# Patient Record
Sex: MALE | Race: BLACK OR AFRICAN AMERICAN | NOT HISPANIC OR LATINO | Employment: UNEMPLOYED | URBAN - METROPOLITAN AREA
[De-identification: names, ages, dates, MRNs, and addresses within clinical notes are randomized per-mention and may not be internally consistent; named-entity substitution may affect disease eponyms.]

---

## 2021-05-24 ENCOUNTER — TELEPHONE (OUTPATIENT)
Dept: PODIATRY | Facility: CLINIC | Age: 61
End: 2021-05-24

## 2021-05-28 ENCOUNTER — OFFICE VISIT (OUTPATIENT)
Dept: PODIATRY | Facility: CLINIC | Age: 61
End: 2021-05-28
Payer: MEDICAID

## 2021-05-28 VITALS — WEIGHT: 214.31 LBS | HEIGHT: 62 IN | BODY MASS INDEX: 39.44 KG/M2

## 2021-05-28 DIAGNOSIS — E11.9 COMPREHENSIVE DIABETIC FOOT EXAMINATION, TYPE 2 DM, ENCOUNTER FOR: Primary | ICD-10-CM

## 2021-05-28 DIAGNOSIS — B35.3 TINEA PEDIS OF BOTH FEET: ICD-10-CM

## 2021-05-28 DIAGNOSIS — M1A.4720 CHRONIC GOUT DUE TO OTHER SECONDARY CAUSE INVOLVING TOE OF LEFT FOOT WITHOUT TOPHUS: ICD-10-CM

## 2021-05-28 DIAGNOSIS — B35.1 ONYCHOMYCOSIS: ICD-10-CM

## 2021-05-28 DIAGNOSIS — M79.674 PAIN IN TOES OF BOTH FEET: ICD-10-CM

## 2021-05-28 DIAGNOSIS — M79.675 PAIN IN TOES OF BOTH FEET: ICD-10-CM

## 2021-05-28 PROCEDURE — 99999 PR PBB SHADOW E&M-EST. PATIENT-LVL III: ICD-10-PCS | Mod: PBBFAC,,, | Performed by: PODIATRIST

## 2021-05-28 PROCEDURE — 99204 PR OFFICE/OUTPT VISIT, NEW, LEVL IV, 45-59 MIN: ICD-10-PCS | Mod: 25,S$PBB,, | Performed by: PODIATRIST

## 2021-05-28 PROCEDURE — 11720 DEBRIDE NAIL 1-5: CPT | Mod: PBBFAC | Performed by: PODIATRIST

## 2021-05-28 PROCEDURE — 99999 PR PBB SHADOW E&M-EST. PATIENT-LVL III: CPT | Mod: PBBFAC,,, | Performed by: PODIATRIST

## 2021-05-28 PROCEDURE — 11720 DEBRIDE NAIL 1-5: CPT | Mod: S$PBB,,, | Performed by: PODIATRIST

## 2021-05-28 PROCEDURE — 99204 OFFICE O/P NEW MOD 45 MIN: CPT | Mod: 25,S$PBB,, | Performed by: PODIATRIST

## 2021-05-28 PROCEDURE — 11720 PR DEBRIDEMENT OF NAIL(S), 1-5: ICD-10-PCS | Mod: S$PBB,,, | Performed by: PODIATRIST

## 2021-05-28 PROCEDURE — 99213 OFFICE O/P EST LOW 20 MIN: CPT | Mod: PBBFAC | Performed by: PODIATRIST

## 2021-05-28 RX ORDER — ATORVASTATIN CALCIUM 20 MG/1
20 TABLET, FILM COATED ORAL DAILY
COMMUNITY
Start: 2021-04-27

## 2021-05-28 RX ORDER — INDOMETHACIN 50 MG/1
50 CAPSULE ORAL 3 TIMES DAILY PRN
Qty: 90 CAPSULE | Refills: 1 | Status: SHIPPED | OUTPATIENT
Start: 2021-05-28 | End: 2023-05-01 | Stop reason: SDUPTHER

## 2021-05-28 RX ORDER — FUROSEMIDE 40 MG/1
40 TABLET ORAL DAILY
COMMUNITY
Start: 2021-05-02

## 2021-05-28 RX ORDER — ASPIRIN 81 MG/1
TABLET ORAL
COMMUNITY

## 2021-05-28 RX ORDER — METOPROLOL SUCCINATE 100 MG/1
100 TABLET, EXTENDED RELEASE ORAL DAILY
COMMUNITY
Start: 2021-05-11

## 2021-05-28 RX ORDER — SITAGLIPTIN AND METFORMIN HYDROCHLORIDE 500; 50 MG/1; MG/1
1 TABLET, FILM COATED ORAL 2 TIMES DAILY
COMMUNITY
Start: 2021-04-20

## 2021-05-28 RX ORDER — KETOCONAZOLE 20 MG/G
CREAM TOPICAL 2 TIMES DAILY
Qty: 30 G | Refills: 2 | Status: SHIPPED | OUTPATIENT
Start: 2021-05-28 | End: 2021-06-27

## 2021-05-28 RX ORDER — TERBINAFINE HYDROCHLORIDE 250 MG/1
250 TABLET ORAL DAILY
Qty: 30 TABLET | Refills: 0 | Status: SHIPPED | OUTPATIENT
Start: 2021-05-28 | End: 2021-06-27

## 2021-05-28 RX ORDER — ALLOPURINOL 300 MG/1
300 TABLET ORAL DAILY
COMMUNITY
Start: 2021-05-02 | End: 2023-04-28 | Stop reason: SDUPTHER

## 2021-05-28 RX ORDER — INDOMETHACIN 50 MG/1
100 CAPSULE ORAL EVERY 8 HOURS PRN
COMMUNITY
Start: 2021-02-24 | End: 2021-05-28

## 2021-05-28 RX ORDER — CLOPIDOGREL BISULFATE 75 MG/1
75 TABLET ORAL DAILY
COMMUNITY
Start: 2021-04-23

## 2021-05-28 RX ORDER — SACUBITRIL AND VALSARTAN 24; 26 MG/1; MG/1
1 TABLET, FILM COATED ORAL 2 TIMES DAILY
COMMUNITY
Start: 2021-05-19

## 2021-08-31 ENCOUNTER — OFFICE VISIT (OUTPATIENT)
Dept: PODIATRY | Facility: CLINIC | Age: 61
End: 2021-08-31
Payer: MEDICAID

## 2021-08-31 VITALS — HEIGHT: 62 IN | WEIGHT: 208.13 LBS | BODY MASS INDEX: 38.3 KG/M2

## 2021-08-31 DIAGNOSIS — M79.674 PAIN IN TOES OF BOTH FEET: ICD-10-CM

## 2021-08-31 DIAGNOSIS — E11.8 TYPE 2 DIABETES MELLITUS WITH COMPLICATION: ICD-10-CM

## 2021-08-31 DIAGNOSIS — B35.1 ONYCHOMYCOSIS: Primary | ICD-10-CM

## 2021-08-31 DIAGNOSIS — M79.675 PAIN IN TOES OF BOTH FEET: ICD-10-CM

## 2021-08-31 PROCEDURE — 99499 UNLISTED E&M SERVICE: CPT | Mod: S$PBB,,, | Performed by: PODIATRIST

## 2021-08-31 PROCEDURE — 11721 DEBRIDE NAIL 6 OR MORE: CPT | Mod: PBBFAC | Performed by: PODIATRIST

## 2021-08-31 PROCEDURE — 99499 NO LOS: ICD-10-PCS | Mod: S$PBB,,, | Performed by: PODIATRIST

## 2021-08-31 PROCEDURE — 99213 OFFICE O/P EST LOW 20 MIN: CPT | Mod: PBBFAC | Performed by: PODIATRIST

## 2021-08-31 PROCEDURE — 11721 DEBRIDE NAIL 6 OR MORE: CPT | Mod: S$PBB,,, | Performed by: PODIATRIST

## 2021-08-31 PROCEDURE — 99999 PR PBB SHADOW E&M-EST. PATIENT-LVL III: ICD-10-PCS | Mod: PBBFAC,,, | Performed by: PODIATRIST

## 2021-08-31 PROCEDURE — 11721 PR DEBRIDEMENT OF NAILS, 6 OR MORE: ICD-10-PCS | Mod: S$PBB,,, | Performed by: PODIATRIST

## 2021-08-31 PROCEDURE — 99999 PR PBB SHADOW E&M-EST. PATIENT-LVL III: CPT | Mod: PBBFAC,,, | Performed by: PODIATRIST

## 2022-02-01 ENCOUNTER — OFFICE VISIT (OUTPATIENT)
Dept: PODIATRY | Facility: CLINIC | Age: 62
End: 2022-02-01
Payer: MEDICAID

## 2022-02-01 VITALS — HEIGHT: 62 IN | BODY MASS INDEX: 38.3 KG/M2 | WEIGHT: 208.13 LBS

## 2022-02-01 DIAGNOSIS — E11.8 TYPE 2 DIABETES MELLITUS WITH COMPLICATION: ICD-10-CM

## 2022-02-01 DIAGNOSIS — B35.1 ONYCHOMYCOSIS: Primary | ICD-10-CM

## 2022-02-01 DIAGNOSIS — M79.675 PAIN IN TOES OF BOTH FEET: ICD-10-CM

## 2022-02-01 DIAGNOSIS — M79.674 PAIN IN TOES OF BOTH FEET: ICD-10-CM

## 2022-02-01 PROCEDURE — 99213 OFFICE O/P EST LOW 20 MIN: CPT | Mod: PBBFAC | Performed by: PODIATRIST

## 2022-02-01 PROCEDURE — 99999 PR PBB SHADOW E&M-EST. PATIENT-LVL III: ICD-10-PCS | Mod: PBBFAC,,, | Performed by: PODIATRIST

## 2022-02-01 PROCEDURE — 99999 PR PBB SHADOW E&M-EST. PATIENT-LVL III: CPT | Mod: PBBFAC,,, | Performed by: PODIATRIST

## 2022-02-01 PROCEDURE — 3008F PR BODY MASS INDEX (BMI) DOCUMENTED: ICD-10-PCS | Mod: CPTII,,, | Performed by: PODIATRIST

## 2022-02-01 PROCEDURE — 1159F MED LIST DOCD IN RCRD: CPT | Mod: CPTII,,, | Performed by: PODIATRIST

## 2022-02-01 PROCEDURE — 11721 DEBRIDE NAIL 6 OR MORE: CPT | Mod: S$PBB,,, | Performed by: PODIATRIST

## 2022-02-01 PROCEDURE — 11721 DEBRIDE NAIL 6 OR MORE: CPT | Mod: PBBFAC | Performed by: PODIATRIST

## 2022-02-01 PROCEDURE — 1160F PR REVIEW ALL MEDS BY PRESCRIBER/CLIN PHARMACIST DOCUMENTED: ICD-10-PCS | Mod: CPTII,,, | Performed by: PODIATRIST

## 2022-02-01 PROCEDURE — 3008F BODY MASS INDEX DOCD: CPT | Mod: CPTII,,, | Performed by: PODIATRIST

## 2022-02-01 PROCEDURE — 99214 OFFICE O/P EST MOD 30 MIN: CPT | Mod: 25,S$PBB,, | Performed by: PODIATRIST

## 2022-02-01 PROCEDURE — 99214 PR OFFICE/OUTPT VISIT, EST, LEVL IV, 30-39 MIN: ICD-10-PCS | Mod: 25,S$PBB,, | Performed by: PODIATRIST

## 2022-02-01 PROCEDURE — 1159F PR MEDICATION LIST DOCUMENTED IN MEDICAL RECORD: ICD-10-PCS | Mod: CPTII,,, | Performed by: PODIATRIST

## 2022-02-01 PROCEDURE — 1160F RVW MEDS BY RX/DR IN RCRD: CPT | Mod: CPTII,,, | Performed by: PODIATRIST

## 2022-02-01 PROCEDURE — 11721 PR DEBRIDEMENT OF NAILS, 6 OR MORE: ICD-10-PCS | Mod: S$PBB,,, | Performed by: PODIATRIST

## 2022-02-01 RX ORDER — TERBINAFINE HYDROCHLORIDE 250 MG/1
250 TABLET ORAL DAILY
Qty: 90 TABLET | Refills: 0 | Status: SHIPPED | OUTPATIENT
Start: 2022-02-01 | End: 2022-05-02

## 2022-02-01 RX ORDER — COLCHICINE 0.6 MG/1
TABLET ORAL
COMMUNITY
Start: 2021-08-13

## 2022-02-01 RX ORDER — TAMSULOSIN HYDROCHLORIDE 0.4 MG/1
1 CAPSULE ORAL DAILY
COMMUNITY
Start: 2021-09-02

## 2022-02-01 RX ORDER — SILDENAFIL 100 MG/1
100 TABLET, FILM COATED ORAL DAILY PRN
COMMUNITY
Start: 2021-08-20

## 2022-02-01 RX ORDER — SPIRONOLACTONE 50 MG/1
50 TABLET, FILM COATED ORAL
COMMUNITY

## 2022-02-01 NOTE — PROGRESS NOTES
"  Subjective:       Patient ID: Hector Smith is a 61 y.o. male.    Chief Complaint: Routine Foot Care (Pt denies pain at present. C/o B/L discoloration to hallux. Diabetic pt, PCP: Dr. Cervantes last seen 11/2021 per pt.)      HPI: Patient presents to the office with the chief complaint of painful, elongated, thickened and dystrophic nail plates to the B/L foot. This patient's PMD is Demarcus Cervantes RN. This patient last saw his/her primary care provider on 11/2/21. Patient is a well controlled DMII. Has used and states failing topical Ketoconazole for nail fungus.     No results found for: HGBA1C.    Review of patient's allergies indicates:  No Known Allergies    History reviewed. No pertinent past medical history.    History reviewed. No pertinent family history.    Social History     Socioeconomic History    Marital status:    Tobacco Use    Smoking status: Former Smoker    Smokeless tobacco: Never Used   Substance and Sexual Activity    Alcohol use: Never    Drug use: Never    Sexual activity: Not Currently       Past Surgical History:   Procedure Laterality Date    SPINAL FUSION N/A 2019       Review of Systems   Constitutional: Negative for chills, fatigue and fever.   HENT: Negative for hearing loss.    Eyes: Negative for photophobia and visual disturbance.   Respiratory: Negative for cough, chest tightness, shortness of breath and wheezing.    Cardiovascular: Negative for chest pain and palpitations.   Gastrointestinal: Negative for constipation, diarrhea, nausea and vomiting.   Endocrine: Negative for cold intolerance and heat intolerance.   Genitourinary: Negative for flank pain.   Musculoskeletal: Negative for neck pain and neck stiffness.   Skin: Negative for wound.   Neurological: Negative for light-headedness and headaches.   Psychiatric/Behavioral: Negative for sleep disturbance.          Objective:   Ht 5' 2" (1.575 m)   Wt 94.4 kg (208 lb 1.8 oz)   BMI 38.06 kg/m²     Physical " Exam    LOWER EXTREMITY PHYSICAL EXAMINATION    DERMATOLOGY: On the left foot, nails 1, 2, 3, 4 and 5 are suggestive of onychomycotic changes. On the right foot, nails 1, 2, 3, 4 and 5 are suggestive of onychomycotic changes. These nail plates are thickened, are dystrophic, chaulky in appearance and malodorous with substantial subungual debris. These nail plates are yellow to brown in appearance. The remaining nail plates are elongated and do not have suggestive clinical features of onychomycosis.     VASCULAR: The B/L LE DP and PT are 2/4. CFT is WNL. Warm to warm proximal to distal.     Assessment:     1. Onychomycosis    2. Pain in toes of both feet    3. Type 2 diabetes mellitus with complication        Plan:     Onychomycosis  -     terbinafine HCL (LAMISIL) 250 mg tablet; Take 1 tablet (250 mg total) by mouth once daily.  Dispense: 90 tablet; Refill: 0    Pain in toes of both feet    Type 2 diabetes mellitus with complication      Onychomycotic nail plates, as outlined above, are sharply debrided with double action nail nipper, and/or with the assistance of a mechanical rotary marisol for reduction of pains. Nails are reduced in terms of length, width and girth with removal of subungual debris to facilitate pain free weight bearing and ambulation. Elongated nails as outlined in the objective portion of this note, were trimmed to appropriate length for alleviation/reduction of pains as well. Follow up in approx. 9-12 weeks.    I counseled the patient on his/her Diabetic Mellitus regarding today's clinical examination and objection findings. We did also discuss recent medication changes, pertinent labs and imaging evaluations and other medical consultation notes and progress notes. Greater than 50% of this visit was spent on counseling and coordination of care. Greater than 20 minutes of this appt. was spent on education about the diabetic foot, in relation to PVD and/or neuropathy, and the prevention of limb  loss.     Shoe gear is inspected and wear and proper fit/type. Patient is reminded of the importance of good nutrition and blood sugar control to help prevent podiatric complications of diabetes. Patient instructed on proper foot hygeine. We discussed wearing proper shoe gear, daily foot inspections, never walking without protective shoe gear, never putting sharp instruments to feet.  Patient  will continue to monitor the areas daily, inspect feet, wear protective shoe gear when ambulatory, moisturizer to maintain skin integrity.     Patient's DMI/DMII is managed by Primary Care Provider and/or Endocrinology Advanced Practice Provider. As per the most recent glycohemoglobin level, this patient is at goal.    Most recent labs reviewed in detail with the patient. All questions and concerns regarding findings and its/their implications are outlined and discussed.    Discussed the various treatment options concerning onychomycosis, these being over-the-counter topicals (efficacy is low in regards to cure), prescription strength topicals (better efficacy as compared to OTC versions, but only slightly so, and potentially costly), laser therapy (which is not covered by insurance companies), oral medications (patient is advised that there is a potential, though less than 5%, for the potential of adverse health and side effects; namely liver pathology) and doing nothing (frequent debridements) as onychomycosis is a cosmetic ailment, and has no potential for long-term deleterious effects on the health. Did discuss the sides effects of PO therapy and what to monitor for, namely, headache, diarrhea, itching and rash, indigestion, liver enzyme abnormalities, taste disturbance, nausea, abdominal pain, flatulence (gas), vomiting and upper respiratory tract infection. Rx. for 90 days course is provided. Repeat LFTs at conclusion of therapy.           No future appointments.

## 2022-05-24 ENCOUNTER — OFFICE VISIT (OUTPATIENT)
Dept: PODIATRY | Facility: CLINIC | Age: 62
End: 2022-05-24
Payer: MEDICAID

## 2022-05-24 VITALS — WEIGHT: 208.13 LBS | HEIGHT: 62 IN | BODY MASS INDEX: 38.3 KG/M2

## 2022-05-24 DIAGNOSIS — E11.8 TYPE 2 DIABETES MELLITUS WITH COMPLICATION: ICD-10-CM

## 2022-05-24 DIAGNOSIS — B35.1 ONYCHOMYCOSIS: Primary | ICD-10-CM

## 2022-05-24 DIAGNOSIS — M79.675 PAIN IN TOES OF BOTH FEET: ICD-10-CM

## 2022-05-24 DIAGNOSIS — M79.674 PAIN IN TOES OF BOTH FEET: ICD-10-CM

## 2022-05-24 PROCEDURE — 99999 PR PBB SHADOW E&M-EST. PATIENT-LVL III: CPT | Mod: PBBFAC,,, | Performed by: PODIATRIST

## 2022-05-24 PROCEDURE — 99213 OFFICE O/P EST LOW 20 MIN: CPT | Mod: PBBFAC | Performed by: PODIATRIST

## 2022-05-24 PROCEDURE — 1160F RVW MEDS BY RX/DR IN RCRD: CPT | Mod: CPTII,,, | Performed by: PODIATRIST

## 2022-05-24 PROCEDURE — 11721 PR DEBRIDEMENT OF NAILS, 6 OR MORE: ICD-10-PCS | Mod: S$PBB,,, | Performed by: PODIATRIST

## 2022-05-24 PROCEDURE — 3008F BODY MASS INDEX DOCD: CPT | Mod: CPTII,,, | Performed by: PODIATRIST

## 2022-05-24 PROCEDURE — 1159F PR MEDICATION LIST DOCUMENTED IN MEDICAL RECORD: ICD-10-PCS | Mod: CPTII,,, | Performed by: PODIATRIST

## 2022-05-24 PROCEDURE — 11721 DEBRIDE NAIL 6 OR MORE: CPT | Mod: S$PBB,,, | Performed by: PODIATRIST

## 2022-05-24 PROCEDURE — 99499 UNLISTED E&M SERVICE: CPT | Mod: S$PBB,,, | Performed by: PODIATRIST

## 2022-05-24 PROCEDURE — 4010F PR ACE/ARB THEARPY RXD/TAKEN: ICD-10-PCS | Mod: CPTII,,, | Performed by: PODIATRIST

## 2022-05-24 PROCEDURE — 99499 NO LOS: ICD-10-PCS | Mod: S$PBB,,, | Performed by: PODIATRIST

## 2022-05-24 PROCEDURE — 1159F MED LIST DOCD IN RCRD: CPT | Mod: CPTII,,, | Performed by: PODIATRIST

## 2022-05-24 PROCEDURE — 3008F PR BODY MASS INDEX (BMI) DOCUMENTED: ICD-10-PCS | Mod: CPTII,,, | Performed by: PODIATRIST

## 2022-05-24 PROCEDURE — 4010F ACE/ARB THERAPY RXD/TAKEN: CPT | Mod: CPTII,,, | Performed by: PODIATRIST

## 2022-05-24 PROCEDURE — 11721 DEBRIDE NAIL 6 OR MORE: CPT | Mod: PBBFAC | Performed by: PODIATRIST

## 2022-05-24 PROCEDURE — 99999 PR PBB SHADOW E&M-EST. PATIENT-LVL III: ICD-10-PCS | Mod: PBBFAC,,, | Performed by: PODIATRIST

## 2022-05-24 PROCEDURE — 1160F PR REVIEW ALL MEDS BY PRESCRIBER/CLIN PHARMACIST DOCUMENTED: ICD-10-PCS | Mod: CPTII,,, | Performed by: PODIATRIST

## 2022-05-24 NOTE — PROGRESS NOTES
"    Subjective:       Patient ID: Hector Smith is a 61 y.o. male.    Chief Complaint: Nail Care (0/10 pain at present, pt also c/o right hallux toenail fungus, diabetic, pcp  last seen 05/01/2022)      HPI: Patient presents to the office with the chief complaint of painful, elongated, thickened and dystrophic nail plates to the B/L foot. This patient's PMD is Demarcus Cervantes RN. This patient last saw his/her primary care provider on 5/1/22. Patient is a well controlled DMII. Did complete 90 days of PO Lamisil recently w/o side effects.     No results found for: HGBA1C.    Review of patient's allergies indicates:  No Known Allergies    History reviewed. No pertinent past medical history.    History reviewed. No pertinent family history.    Social History     Socioeconomic History    Marital status:    Tobacco Use    Smoking status: Former Smoker    Smokeless tobacco: Never Used   Substance and Sexual Activity    Alcohol use: Never    Drug use: Never    Sexual activity: Not Currently       Past Surgical History:   Procedure Laterality Date    SPINAL FUSION N/A 2019       Review of Systems   Constitutional: Negative for chills, fatigue and fever.   HENT: Negative for hearing loss.    Eyes: Negative for photophobia and visual disturbance.   Respiratory: Negative for cough, chest tightness, shortness of breath and wheezing.    Cardiovascular: Negative for chest pain and palpitations.   Gastrointestinal: Negative for constipation, diarrhea, nausea and vomiting.   Endocrine: Negative for cold intolerance and heat intolerance.   Genitourinary: Negative for flank pain.   Musculoskeletal: Negative for neck pain and neck stiffness.   Skin: Negative for wound.   Neurological: Negative for light-headedness and headaches.   Psychiatric/Behavioral: Negative for sleep disturbance.          Objective:   Ht 5' 2" (1.575 m)   Wt 94.4 kg (208 lb 1.8 oz)   BMI 38.06 kg/m²     Physical Exam    LOWER EXTREMITY " PHYSICAL EXAMINATION    DERMATOLOGY: On the left foot, nails 1, 2, 3, 4 and 5 are suggestive of onychomycotic changes. On the right foot, nails 1, 2, 3, 4 and 5 are suggestive of onychomycotic changes. These nail plates are thickened, are dystrophic, chaulky in appearance and malodorous with substantial subungual debris. These nail plates are yellow to brown in appearance. The remaining nail plates are elongated and do not have suggestive clinical features of onychomycosis.     VASCULAR: The B/L LE DP and PT are 2/4. CFT is WNL. Warm to warm proximal to distal.     Assessment:     1. Onychomycosis    2. Pain in toes of both feet    3. Type 2 diabetes mellitus with complication        Plan:     Onychomycosis    Pain in toes of both feet    Type 2 diabetes mellitus with complication      Onychomycotic nail plates, as outlined above, are sharply debrided with double action nail nipper, and/or with the assistance of a mechanical rotary marisol for reduction of pains. Nails are reduced in terms of length, width and girth with removal of subungual debris to facilitate pain free weight bearing and ambulation. Elongated nails as outlined in the objective portion of this note, were trimmed to appropriate length for alleviation/reduction of pains as well. Follow up in approx. 9-12 weeks.    I counseled the patient on his/her Diabetic Mellitus regarding today's clinical examination and objection findings. We did also discuss recent medication changes, pertinent labs and imaging evaluations and other medical consultation notes and progress notes. Greater than 50% of this visit was spent on counseling and coordination of care. Greater than 20 minutes of this appt. was spent on education about the diabetic foot, in relation to PVD and/or neuropathy, and the prevention of limb loss.     Shoe gear is inspected and wear and proper fit/type. Patient is reminded of the importance of good nutrition and blood sugar control to help prevent  podiatric complications of diabetes. Patient instructed on proper foot hygeine. We discussed wearing proper shoe gear, daily foot inspections, never walking without protective shoe gear, never putting sharp instruments to feet.  Patient  will continue to monitor the areas daily, inspect feet, wear protective shoe gear when ambulatory, moisturizer to maintain skin integrity.     Patient's DMI/DMII is managed by Primary Care Provider and/or Endocrinology Advanced Practice Provider. As per the most recent glycohemoglobin level, this patient is at goal.              Future Appointments   Date Time Provider Department Center   5/24/2022  9:45 AM Vicente Rouse DPM [facility]

## 2022-09-09 ENCOUNTER — OFFICE VISIT (OUTPATIENT)
Dept: PODIATRY | Facility: CLINIC | Age: 62
End: 2022-09-09
Payer: MEDICAID

## 2022-09-09 VITALS — WEIGHT: 208 LBS | HEIGHT: 62 IN | BODY MASS INDEX: 38.28 KG/M2

## 2022-09-09 DIAGNOSIS — E11.8 TYPE 2 DIABETES MELLITUS WITH COMPLICATION: ICD-10-CM

## 2022-09-09 DIAGNOSIS — M79.675 PAIN IN TOES OF BOTH FEET: ICD-10-CM

## 2022-09-09 DIAGNOSIS — M79.674 PAIN IN TOES OF BOTH FEET: ICD-10-CM

## 2022-09-09 DIAGNOSIS — B35.1 ONYCHOMYCOSIS: Primary | ICD-10-CM

## 2022-09-09 PROCEDURE — 1160F PR REVIEW ALL MEDS BY PRESCRIBER/CLIN PHARMACIST DOCUMENTED: ICD-10-PCS | Mod: CPTII,,, | Performed by: PODIATRIST

## 2022-09-09 PROCEDURE — 99499 UNLISTED E&M SERVICE: CPT | Mod: S$PBB,,, | Performed by: PODIATRIST

## 2022-09-09 PROCEDURE — 11721 PR DEBRIDEMENT OF NAILS, 6 OR MORE: ICD-10-PCS | Mod: S$PBB,,, | Performed by: PODIATRIST

## 2022-09-09 PROCEDURE — 99999 PR PBB SHADOW E&M-EST. PATIENT-LVL III: CPT | Mod: PBBFAC,,, | Performed by: PODIATRIST

## 2022-09-09 PROCEDURE — 11721 DEBRIDE NAIL 6 OR MORE: CPT | Mod: PBBFAC | Performed by: PODIATRIST

## 2022-09-09 PROCEDURE — 3008F PR BODY MASS INDEX (BMI) DOCUMENTED: ICD-10-PCS | Mod: CPTII,,, | Performed by: PODIATRIST

## 2022-09-09 PROCEDURE — 4010F ACE/ARB THERAPY RXD/TAKEN: CPT | Mod: CPTII,,, | Performed by: PODIATRIST

## 2022-09-09 PROCEDURE — 11721 DEBRIDE NAIL 6 OR MORE: CPT | Mod: S$PBB,,, | Performed by: PODIATRIST

## 2022-09-09 PROCEDURE — 1159F PR MEDICATION LIST DOCUMENTED IN MEDICAL RECORD: ICD-10-PCS | Mod: CPTII,,, | Performed by: PODIATRIST

## 2022-09-09 PROCEDURE — 99213 OFFICE O/P EST LOW 20 MIN: CPT | Mod: PBBFAC | Performed by: PODIATRIST

## 2022-09-09 PROCEDURE — 1159F MED LIST DOCD IN RCRD: CPT | Mod: CPTII,,, | Performed by: PODIATRIST

## 2022-09-09 PROCEDURE — 1160F RVW MEDS BY RX/DR IN RCRD: CPT | Mod: CPTII,,, | Performed by: PODIATRIST

## 2022-09-09 PROCEDURE — 99999 PR PBB SHADOW E&M-EST. PATIENT-LVL III: ICD-10-PCS | Mod: PBBFAC,,, | Performed by: PODIATRIST

## 2022-09-09 PROCEDURE — 4010F PR ACE/ARB THEARPY RXD/TAKEN: ICD-10-PCS | Mod: CPTII,,, | Performed by: PODIATRIST

## 2022-09-09 PROCEDURE — 99499 NO LOS: ICD-10-PCS | Mod: S$PBB,,, | Performed by: PODIATRIST

## 2022-09-09 PROCEDURE — 3008F BODY MASS INDEX DOCD: CPT | Mod: CPTII,,, | Performed by: PODIATRIST

## 2022-09-09 NOTE — PROGRESS NOTES
"    Subjective:       Patient ID: Hector Smith is a 61 y.o. male.    Chief Complaint: Routine Foot Care (Routine foot care, diabetic wears casual shoes and socks, Last seen Helen Tracy NP 05/01/2022)      HPI: Patient presents to the office with the chief complaint of painful, elongated, thickened and dystrophic nail plates to the B/L foot. This patient's PMD is Demarcus Cervantes RN. This patient last saw his/her primary care provider on 5/1/22. Patient is a well controlled DMII.     No results found for: HGBA1C.    Review of patient's allergies indicates:  No Known Allergies    Past Medical History:   Diagnosis Date    Diabetes mellitus, type 2     Hyperlipidemia     Hypertension        History reviewed. No pertinent family history.    Social History     Socioeconomic History    Marital status:    Tobacco Use    Smoking status: Former    Smokeless tobacco: Never   Substance and Sexual Activity    Alcohol use: Never    Drug use: Never    Sexual activity: Not Currently       Past Surgical History:   Procedure Laterality Date    SPINAL FUSION N/A 2019       Review of Systems   Constitutional:  Negative for chills, fatigue and fever.   HENT:  Negative for hearing loss.    Eyes:  Negative for photophobia and visual disturbance.   Respiratory:  Negative for cough, chest tightness, shortness of breath and wheezing.    Cardiovascular:  Negative for chest pain and palpitations.   Gastrointestinal:  Negative for constipation, diarrhea, nausea and vomiting.   Endocrine: Negative for cold intolerance and heat intolerance.   Genitourinary:  Negative for flank pain.   Musculoskeletal:  Negative for neck pain and neck stiffness.   Skin:  Negative for wound.   Neurological:  Negative for light-headedness and headaches.   Psychiatric/Behavioral:  Negative for sleep disturbance.         Objective:   Ht 5' 2" (1.575 m)   Wt 94.3 kg (208 lb)   BMI 38.04 kg/m²     Physical Exam    LOWER EXTREMITY PHYSICAL " EXAMINATION    DERMATOLOGY: On the left foot, nails 1, 2, 3, 4 and 5 are suggestive of onychomycotic changes. On the right foot, nails 1, 2, 3, 4 and 5 are suggestive of onychomycotic changes. These nail plates are thickened, are dystrophic, chaulky in appearance and malodorous with substantial subungual debris. These nail plates are yellow to brown in appearance. The remaining nail plates are elongated and do not have suggestive clinical features of onychomycosis.     VASCULAR: The B/L LE DP and PT are 2/4. CFT is WNL. Warm to warm proximal to distal.     Assessment:     1. Onychomycosis    2. Pain in toes of both feet    3. Type 2 diabetes mellitus with complication          Plan:     Onychomycosis    Pain in toes of both feet    Type 2 diabetes mellitus with complication    I counseled the patient on his/her Diabetic Mellitus regarding today's clinical examination and objection findings. We did also discuss recent medication changes, pertinent labs and imaging evaluations and other medical consultation notes and progress notes. Greater than 50% of this visit was spent on counseling and coordination of care. Greater than 20 minutes of this appt. was spent on education about the diabetic foot, in relation to PVD and/or neuropathy, and the prevention of limb loss.     Shoe gear is inspected and wear and proper fit/type. Patient is reminded of the importance of good nutrition and blood sugar control to help prevent podiatric complications of diabetes. Patient instructed on proper foot hygeine. We discussed wearing proper shoe gear, daily foot inspections, never walking without protective shoe gear, never putting sharp instruments to feet.  Patient  will continue to monitor the areas daily, inspect feet, wear protective shoe gear when ambulatory, moisturizer to maintain skin integrity.     Patient's DMI/DMII is managed by Primary Care Provider and/or Endocrinology Advanced Practice Provider. As per the most recent  glycohemoglobin level, this patient is at goal.    Onychomycotic nail plates, as outlined above, are sharply debrided with double action nail nipper, and/or with the assistance of a mechanical rotary marisol for reduction of pains. Nails are reduced in terms of length, width and girth with removal of subungual debris to facilitate pain free weight bearing and ambulation. Elongated nails as outlined in the objective portion of this note, were trimmed to appropriate length for alleviation/reduction of pains as well. Follow up in approx. 9-12 weeks.            Future Appointments   Date Time Provider Department Center   9/9/2022  9:30 AM Vicente Rouse DPM [facility]

## 2023-03-24 ENCOUNTER — OFFICE VISIT (OUTPATIENT)
Dept: PODIATRY | Facility: CLINIC | Age: 63
End: 2023-03-24
Payer: COMMERCIAL

## 2023-03-24 DIAGNOSIS — B35.1 ONYCHOMYCOSIS: Primary | ICD-10-CM

## 2023-03-24 DIAGNOSIS — E11.8 TYPE 2 DIABETES MELLITUS WITH COMPLICATION: ICD-10-CM

## 2023-03-24 DIAGNOSIS — M79.675 PAIN IN TOES OF BOTH FEET: ICD-10-CM

## 2023-03-24 DIAGNOSIS — M79.674 PAIN IN TOES OF BOTH FEET: ICD-10-CM

## 2023-03-24 PROCEDURE — 4010F PR ACE/ARB THEARPY RXD/TAKEN: ICD-10-PCS | Mod: CPTII,S$GLB,, | Performed by: PODIATRIST

## 2023-03-24 PROCEDURE — 11721 PR DEBRIDEMENT OF NAILS, 6 OR MORE: ICD-10-PCS | Mod: S$GLB,,, | Performed by: PODIATRIST

## 2023-03-24 PROCEDURE — 1159F MED LIST DOCD IN RCRD: CPT | Mod: CPTII,S$GLB,, | Performed by: PODIATRIST

## 2023-03-24 PROCEDURE — 11721 DEBRIDE NAIL 6 OR MORE: CPT | Mod: S$GLB,,, | Performed by: PODIATRIST

## 2023-03-24 PROCEDURE — 99499 UNLISTED E&M SERVICE: CPT | Mod: S$GLB,,, | Performed by: PODIATRIST

## 2023-03-24 PROCEDURE — 99499 NO LOS: ICD-10-PCS | Mod: S$GLB,,, | Performed by: PODIATRIST

## 2023-03-24 PROCEDURE — 1159F PR MEDICATION LIST DOCUMENTED IN MEDICAL RECORD: ICD-10-PCS | Mod: CPTII,S$GLB,, | Performed by: PODIATRIST

## 2023-03-24 PROCEDURE — 1160F RVW MEDS BY RX/DR IN RCRD: CPT | Mod: CPTII,S$GLB,, | Performed by: PODIATRIST

## 2023-03-24 PROCEDURE — 99999 PR PBB SHADOW E&M-EST. PATIENT-LVL II: CPT | Mod: PBBFAC,,, | Performed by: PODIATRIST

## 2023-03-24 PROCEDURE — 99212 OFFICE O/P EST SF 10 MIN: CPT | Mod: PBBFAC | Performed by: PODIATRIST

## 2023-03-24 PROCEDURE — 1160F PR REVIEW ALL MEDS BY PRESCRIBER/CLIN PHARMACIST DOCUMENTED: ICD-10-PCS | Mod: CPTII,S$GLB,, | Performed by: PODIATRIST

## 2023-03-24 PROCEDURE — 4010F ACE/ARB THERAPY RXD/TAKEN: CPT | Mod: CPTII,S$GLB,, | Performed by: PODIATRIST

## 2023-03-24 PROCEDURE — 11721 DEBRIDE NAIL 6 OR MORE: CPT | Mod: PBBFAC | Performed by: PODIATRIST

## 2023-03-24 PROCEDURE — 99999 PR PBB SHADOW E&M-EST. PATIENT-LVL II: ICD-10-PCS | Mod: PBBFAC,,, | Performed by: PODIATRIST

## 2023-03-24 NOTE — PROGRESS NOTES
Subjective:       Patient ID: Hector Smith is a 62 y.o. male.    Chief Complaint: Nail Care    HPI: Patient presents to the office with the chief complaint of painful, elongated, thickened and dystrophic nail plates to the B/L foot. This patient's PMD is Demarcus Cervantes RN. This patient last saw his/her primary care provider on 2/8/22. Patient is a well controlled DMII.     No results found for: HGBA1C.    Review of patient's allergies indicates:  No Known Allergies    Past Medical History:   Diagnosis Date    Diabetes mellitus, type 2     Hyperlipidemia     Hypertension        History reviewed. No pertinent family history.    Social History     Socioeconomic History    Marital status:    Tobacco Use    Smoking status: Former    Smokeless tobacco: Never   Substance and Sexual Activity    Alcohol use: Never    Drug use: Never    Sexual activity: Not Currently       Past Surgical History:   Procedure Laterality Date    SPINAL FUSION N/A 2019       Review of Systems   Constitutional:  Negative for chills, fatigue and fever.   HENT:  Negative for hearing loss.    Eyes:  Negative for photophobia and visual disturbance.   Respiratory:  Negative for cough, chest tightness, shortness of breath and wheezing.    Cardiovascular:  Negative for chest pain and palpitations.   Gastrointestinal:  Negative for constipation, diarrhea, nausea and vomiting.   Endocrine: Negative for cold intolerance and heat intolerance.   Genitourinary:  Negative for flank pain.   Musculoskeletal:  Negative for neck pain and neck stiffness.   Skin:  Negative for wound.   Neurological:  Negative for light-headedness and headaches.   Psychiatric/Behavioral:  Negative for sleep disturbance.         Objective:   There were no vitals taken for this visit.    Physical Exam    LOWER EXTREMITY PHYSICAL EXAMINATION  VASCULAR: The B/L LE DP and PT are 2/4. CFT is WNL. Warm to warm proximal to distal.     DERMATOLOGY: On the left foot, nails 1, 2, 3, 4  and 5 are suggestive of onychomycotic changes. On the right foot, nails 1, 2, 3, 4 and 5 are suggestive of onychomycotic changes. These nail plates are thickened, are dystrophic, chaulky in appearance and malodorous with substantial subungual debris. These nail plates are yellow to brown in appearance. The remaining nail plates are elongated and do not have suggestive clinical features of onychomycosis.     Assessment:     1. Onychomycosis    2. Pain in toes of both feet    3. Type 2 diabetes mellitus with complication          Plan:     Onychomycosis    Pain in toes of both feet    Type 2 diabetes mellitus with complication      I counseled the patient on his/her Diabetic Mellitus regarding today's clinical examination and objection findings. We did also discuss recent medication changes, pertinent labs and imaging evaluations and other medical consultation notes and progress notes. Greater than 50% of this visit was spent on counseling and coordination of care. Greater than 20 minutes of this appt. was spent on education about the diabetic foot, in relation to PVD and/or neuropathy, and the prevention of limb loss.     Shoe gear is inspected and wear and proper fit/type. Patient is reminded of the importance of good nutrition and blood sugar control to help prevent podiatric complications of diabetes. Patient instructed on proper foot hygeine. We discussed wearing proper shoe gear, daily foot inspections, never walking without protective shoe gear, never putting sharp instruments to feet.  Patient  will continue to monitor the areas daily, inspect feet, wear protective shoe gear when ambulatory, moisturizer to maintain skin integrity.     Patient's DMI/DMII is managed by Primary Care Provider and/or Endocrinology Advanced Practice Provider. As per the most recent glycohemoglobin level, this patient is at goal.    Onychomycotic nail plates, as outlined above, are sharply debrided with double action nail nipper,  and/or with the assistance of a mechanical rotary marisol for reduction of pains. Nails are reduced in terms of length, width and girth with removal of subungual debris to facilitate pain free weight bearing and ambulation. Elongated nails as outlined in the objective portion of this note, were trimmed to appropriate length for alleviation/reduction of pains as well. Follow up in approx. 9-12 weeks.            Future Appointments   Date Time Provider Department Center   3/24/2023  3:00 PM Vicente Rouse DPM [facility]

## 2023-04-28 DIAGNOSIS — M1A.4720 CHRONIC GOUT DUE TO OTHER SECONDARY CAUSE INVOLVING TOE OF LEFT FOOT WITHOUT TOPHUS: Primary | ICD-10-CM

## 2023-04-28 RX ORDER — ALLOPURINOL 300 MG/1
300 TABLET ORAL DAILY
Qty: 30 TABLET | Refills: 2 | Status: SHIPPED | OUTPATIENT
Start: 2023-04-28 | End: 2023-10-09

## 2023-05-01 DIAGNOSIS — M1A.4720 CHRONIC GOUT DUE TO OTHER SECONDARY CAUSE INVOLVING TOE OF LEFT FOOT WITHOUT TOPHUS: ICD-10-CM

## 2023-05-01 RX ORDER — INDOMETHACIN 50 MG/1
50 CAPSULE ORAL 3 TIMES DAILY PRN
Qty: 90 CAPSULE | Refills: 1 | Status: SHIPPED | OUTPATIENT
Start: 2023-05-01

## 2023-05-09 ENCOUNTER — OFFICE VISIT (OUTPATIENT)
Dept: PODIATRY | Facility: CLINIC | Age: 63
End: 2023-05-09
Payer: COMMERCIAL

## 2023-05-09 DIAGNOSIS — L03.032 PARONYCHIA OF THIRD TOE OF LEFT FOOT: Primary | ICD-10-CM

## 2023-05-09 DIAGNOSIS — M79.675 PAIN IN LEFT TOE(S): ICD-10-CM

## 2023-05-09 DIAGNOSIS — E11.8 TYPE 2 DIABETES MELLITUS WITH COMPLICATION: ICD-10-CM

## 2023-05-09 PROCEDURE — 11730 PR REMOVAL OF NAIL PLATE: ICD-10-PCS | Mod: T2,S$PBB,, | Performed by: PODIATRIST

## 2023-05-09 PROCEDURE — 99213 PR OFFICE/OUTPT VISIT, EST, LEVL III, 20-29 MIN: ICD-10-PCS | Mod: 25,S$PBB,, | Performed by: PODIATRIST

## 2023-05-09 PROCEDURE — 99999 PR PBB SHADOW E&M-EST. PATIENT-LVL III: CPT | Mod: PBBFAC,,, | Performed by: PODIATRIST

## 2023-05-09 PROCEDURE — 4010F PR ACE/ARB THEARPY RXD/TAKEN: ICD-10-PCS | Mod: CPTII,,, | Performed by: PODIATRIST

## 2023-05-09 PROCEDURE — 1160F RVW MEDS BY RX/DR IN RCRD: CPT | Mod: CPTII,,, | Performed by: PODIATRIST

## 2023-05-09 PROCEDURE — 4010F ACE/ARB THERAPY RXD/TAKEN: CPT | Mod: CPTII,,, | Performed by: PODIATRIST

## 2023-05-09 PROCEDURE — 99213 OFFICE O/P EST LOW 20 MIN: CPT | Mod: 25,S$PBB,, | Performed by: PODIATRIST

## 2023-05-09 PROCEDURE — 1160F PR REVIEW ALL MEDS BY PRESCRIBER/CLIN PHARMACIST DOCUMENTED: ICD-10-PCS | Mod: CPTII,,, | Performed by: PODIATRIST

## 2023-05-09 PROCEDURE — 99999 PR PBB SHADOW E&M-EST. PATIENT-LVL III: ICD-10-PCS | Mod: PBBFAC,,, | Performed by: PODIATRIST

## 2023-05-09 PROCEDURE — 1159F PR MEDICATION LIST DOCUMENTED IN MEDICAL RECORD: ICD-10-PCS | Mod: CPTII,,, | Performed by: PODIATRIST

## 2023-05-09 PROCEDURE — 1159F MED LIST DOCD IN RCRD: CPT | Mod: CPTII,,, | Performed by: PODIATRIST

## 2023-05-09 PROCEDURE — 99213 OFFICE O/P EST LOW 20 MIN: CPT | Mod: PBBFAC,25 | Performed by: PODIATRIST

## 2023-05-09 PROCEDURE — 11730 AVULSION NAIL PLATE SIMPLE 1: CPT | Mod: T2,S$PBB,, | Performed by: PODIATRIST

## 2023-05-09 PROCEDURE — 11730 AVULSION NAIL PLATE SIMPLE 1: CPT | Mod: T2,PBBFAC | Performed by: PODIATRIST

## 2023-05-09 NOTE — PROGRESS NOTES
Subjective:       Patient ID: Hector Smith is a 62 y.o. male.    Chief Complaint: Foot Problem (Patient in with a complaint of his toe bleeding on his left foot, states he fell last Monday and bruised it. Patient is a diabetic and was last seen by his pcp about three months ago, states his pcp is no longer at the location where he was being seen at [facility] on [place]. .  )      HPI: Hector Smith presents to the office with complaints of pains to the left 3rd  toe, due to ingrowing. States moderate drainage. States swelling, redness and moderate to severe pains. Symptoms have been on going for several days and are worsening. States difficulties with walking as a result of pains. Walking and standing, particularly with shoe gear, exacerbates the ailment. Pains are sharp in nature and are rated at approx. 8/10. Patient's Primary Care Provider is Demarcus Cervantes RN. Is a well controlled DMII.    No results found for: HGBA1C      Review of patient's allergies indicates:  No Known Allergies    Past Medical History:   Diagnosis Date    Diabetes mellitus, type 2     Hyperlipidemia     Hypertension        History reviewed. No pertinent family history.    Social History     Socioeconomic History    Marital status:    Tobacco Use    Smoking status: Former    Smokeless tobacco: Never   Substance and Sexual Activity    Alcohol use: Never    Drug use: Never    Sexual activity: Not Currently       Past Surgical History:   Procedure Laterality Date    SPINAL FUSION N/A 2019       Review of Systems   Constitutional:  Negative for chills, fatigue and fever.   HENT:  Negative for hearing loss.    Eyes:  Negative for photophobia and visual disturbance.   Respiratory:  Negative for cough, chest tightness, shortness of breath and wheezing.    Cardiovascular:  Negative for chest pain and palpitations.   Gastrointestinal:  Negative for constipation, diarrhea, nausea and vomiting.   Endocrine: Negative for cold intolerance and  heat intolerance.   Genitourinary:  Negative for flank pain.   Musculoskeletal:  Negative for neck pain and neck stiffness.   Skin:  Positive for color change and wound.   Neurological:  Negative for light-headedness and headaches.   Psychiatric/Behavioral:  Negative for sleep disturbance.        Objective:   There were no vitals taken for this visit.    Physical Exam  LOWER EXTREMITY PHYSICAL EXAMINATION  VASCULAR: On the left foot, the dorsalis pedis pulse is 2/4 and the posterior tibial pulse is 2/4. Capillary refill time is less than 3 seconds. Hair growth is present on the dorsum of the foot and at the digits. Proximal to distal temperature is warm to warm.    DERMATOLOGY: Ingrowing of the left foot medial nail border of the 3rd toe. The nail is incurvated into the skin of the affected border, causing pains, which are moderate in nature. There is moderate to severe edema. There is moderate cellulitis noted. There is scant drainage. No fluctuance. Granuloma formation is absent.    ORTHOPEDIC: Manual Muscle Testing is 5/5 in all planes on the left, without pains, with and without resistance. Gait pattern is slightly antalgic.    Assessment:     1. Paronychia of third toe of left foot    2. Pain in left toe(s)    3. Type 2 diabetes mellitus with complication        Plan:     Paronychia of third toe of left foot    Pain in left toe(s)    Type 2 diabetes mellitus with complication      A TIME-OUT was completed. Oral, written and verbal consent were obtained from patient, prior to procedure being performed as discussed below.    The LLE 3rd toe was anesthetized with a total of 3cc of 0.50% Marcaine Plain mixed with Lidocaine with Epinephrine.  The area was then prepped appropriately with betadine paint. Following this, a [place] Elevator was utilized to free up the offending nail border, from the surrounding soft tissues.  Next, an English Anvil was used to split the nail from distal to proximal. Once freed from the  soft tissue structures at the of the offending nail fold, a Curved Hemostat was used to remove the offending portion of nail.  A curette was then utilized to remove and and all debris from the border of the nail. Antibiotic cream and a sterile bandage with Coban was placed on the digit.      Patient was informed of the possibility of recurrence of an ingrowing nail. Patient was given written and oral instructions for care including the office phone number if any questions or concerns arise.      Patient to start daily application of topical ABx. ointment with a bandage.     Patient to follow up in approx. 10 to 14 days, if needed.          No future appointments.

## 2023-10-03 ENCOUNTER — OFFICE VISIT (OUTPATIENT)
Dept: PODIATRY | Facility: CLINIC | Age: 63
End: 2023-10-03
Payer: MEDICAID

## 2023-10-03 DIAGNOSIS — E11.8 TYPE 2 DIABETES MELLITUS WITH COMPLICATION: ICD-10-CM

## 2023-10-03 DIAGNOSIS — M79.674 PAIN OF RIGHT GREAT TOE: ICD-10-CM

## 2023-10-03 DIAGNOSIS — B35.1 ONYCHOMYCOSIS: ICD-10-CM

## 2023-10-03 DIAGNOSIS — M79.675 PAIN IN TOES OF BOTH FEET: ICD-10-CM

## 2023-10-03 DIAGNOSIS — M79.674 PAIN IN TOES OF BOTH FEET: ICD-10-CM

## 2023-10-03 DIAGNOSIS — L03.031 PARONYCHIA OF GREAT TOE OF RIGHT FOOT: Primary | ICD-10-CM

## 2023-10-03 PROCEDURE — 11721 DEBRIDE NAIL 6 OR MORE: CPT | Mod: PBBFAC | Performed by: PODIATRIST

## 2023-10-03 PROCEDURE — 99213 PR OFFICE/OUTPT VISIT, EST, LEVL III, 20-29 MIN: ICD-10-PCS | Mod: 25,S$PBB,, | Performed by: PODIATRIST

## 2023-10-03 PROCEDURE — 1159F MED LIST DOCD IN RCRD: CPT | Mod: CPTII,,, | Performed by: PODIATRIST

## 2023-10-03 PROCEDURE — 4010F PR ACE/ARB THEARPY RXD/TAKEN: ICD-10-PCS | Mod: CPTII,,, | Performed by: PODIATRIST

## 2023-10-03 PROCEDURE — 11730 AVULSION NAIL PLATE SIMPLE 1: CPT | Mod: T5,59,PBBFAC | Performed by: PODIATRIST

## 2023-10-03 PROCEDURE — 11721 DEBRIDE NAIL 6 OR MORE: CPT | Mod: S$PBB,,, | Performed by: PODIATRIST

## 2023-10-03 PROCEDURE — 4010F ACE/ARB THERAPY RXD/TAKEN: CPT | Mod: CPTII,,, | Performed by: PODIATRIST

## 2023-10-03 PROCEDURE — 11730 AVULSION NAIL PLATE SIMPLE 1: CPT | Mod: T5,59,S$PBB, | Performed by: PODIATRIST

## 2023-10-03 PROCEDURE — 99213 OFFICE O/P EST LOW 20 MIN: CPT | Mod: 25,S$PBB,, | Performed by: PODIATRIST

## 2023-10-03 PROCEDURE — 99999 PR PBB SHADOW E&M-EST. PATIENT-LVL II: ICD-10-PCS | Mod: PBBFAC,,, | Performed by: PODIATRIST

## 2023-10-03 PROCEDURE — 99212 OFFICE O/P EST SF 10 MIN: CPT | Mod: PBBFAC | Performed by: PODIATRIST

## 2023-10-03 PROCEDURE — 1160F PR REVIEW ALL MEDS BY PRESCRIBER/CLIN PHARMACIST DOCUMENTED: ICD-10-PCS | Mod: CPTII,,, | Performed by: PODIATRIST

## 2023-10-03 PROCEDURE — 11730 PR REMOVAL OF NAIL PLATE: ICD-10-PCS | Mod: T5,59,S$PBB, | Performed by: PODIATRIST

## 2023-10-03 PROCEDURE — 1159F PR MEDICATION LIST DOCUMENTED IN MEDICAL RECORD: ICD-10-PCS | Mod: CPTII,,, | Performed by: PODIATRIST

## 2023-10-03 PROCEDURE — 1160F RVW MEDS BY RX/DR IN RCRD: CPT | Mod: CPTII,,, | Performed by: PODIATRIST

## 2023-10-03 PROCEDURE — 99999 PR PBB SHADOW E&M-EST. PATIENT-LVL II: CPT | Mod: PBBFAC,,, | Performed by: PODIATRIST

## 2023-10-03 PROCEDURE — 11721 PR DEBRIDEMENT OF NAILS, 6 OR MORE: ICD-10-PCS | Mod: S$PBB,,, | Performed by: PODIATRIST

## 2023-10-03 NOTE — PROGRESS NOTES
"Subjective:       Patient ID: Hector Smith is a 62 y.o. male.    Chief Complaint: Routine Foot Care (Patient is a diabetic and was last seen  on 9.29.23 by Dr. Ochoa Lane with [facility]. He complains of 8/10pain to right medial hallux nail at present and continues on plavix.)    HPI: Patient presents to the office with the chief complaint of painful, elongated, thickened and dystrophic nail plates to the B/L foot. This patient's PMD is Ochoa Lane MD. This patient last saw his/her primary care provider on 9/29/23. Patient is a well controlled DMII. States ingrowing, RLE, great toe. States mild infection.     No results found for: "HGBA1C".    Review of patient's allergies indicates:  No Known Allergies    Past Medical History:   Diagnosis Date    Diabetes mellitus, type 2     Hyperlipidemia     Hypertension        History reviewed. No pertinent family history.    Social History     Socioeconomic History    Marital status:    Tobacco Use    Smoking status: Former    Smokeless tobacco: Never   Substance and Sexual Activity    Alcohol use: Never    Drug use: Never    Sexual activity: Not Currently       Past Surgical History:   Procedure Laterality Date    SPINAL FUSION N/A 2019       Review of Systems   Constitutional:  Negative for chills, fatigue and fever.   HENT:  Negative for hearing loss.    Eyes:  Negative for photophobia and visual disturbance.   Respiratory:  Negative for cough, chest tightness, shortness of breath and wheezing.    Cardiovascular:  Negative for chest pain and palpitations.   Gastrointestinal:  Negative for constipation, diarrhea, nausea and vomiting.   Endocrine: Negative for cold intolerance and heat intolerance.   Genitourinary:  Negative for flank pain.   Musculoskeletal:  Negative for neck pain and neck stiffness.   Skin:  Negative for wound.   Neurological:  Negative for light-headedness and headaches.   Psychiatric/Behavioral:  Negative for sleep disturbance.  "          Objective:   There were no vitals taken for this visit.    Physical Exam    LOWER EXTREMITY PHYSICAL EXAMINATION  VASCULAR: The B/L LE DP and PT are 2/4. CFT is WNL. Warm to warm proximal to distal.     DERMATOLOGY: On the left foot, nails 1, 2, 3, 4 and 5 are suggestive of onychomycotic changes. On the right foot, nails 1, 2, 3, 4 and 5 are suggestive of onychomycotic changes. These nail plates are thickened, are dystrophic, chaulky in appearance and malodorous with substantial subungual debris. These nail plates are yellow to brown in appearance. The remaining nail plates are elongated and do not have suggestive clinical features of onychomycosis. Ingrowing of the right foot medial nail border of the great toe. The nail is incurvated into the skin of the affected border, causing pains, which are moderate in nature. There is moderate to severe edema. There is no cellulitis noted. There is scant drainage. No fluctuance. Granuloma formation is absent.--    NEUROLOGY: Proprioception is intact, bilateral. Sensation to light touch is intact.     Assessment:     1. Paronychia of great toe of right foot    2. Pain of right great toe    3. Onychomycosis    4. Pain in toes of both feet    5. Type 2 diabetes mellitus with complication          Plan:     Paronychia of great toe of right foot    Pain of right great toe    Onychomycosis    Pain in toes of both feet    Type 2 diabetes mellitus with complication      I counseled the patient on his/her Diabetic Mellitus regarding today's clinical examination and objection findings. We did also discuss recent medication changes, pertinent labs and imaging evaluations and other medical consultation notes and progress notes. Greater than 50% of this visit was spent on counseling and coordination of care. Greater than 20 minutes of this appt. was spent on education about the diabetic foot, in relation to PVD and/or neuropathy, and the prevention of limb loss.     Shoe gear is  inspected and wear and proper fit/type. Patient is reminded of the importance of good nutrition and blood sugar control to help prevent podiatric complications of diabetes. Patient instructed on proper foot hygeine. We discussed wearing proper shoe gear, daily foot inspections, never walking without protective shoe gear, never putting sharp instruments to feet.  Patient  will continue to monitor the areas daily, inspect feet, wear protective shoe gear when ambulatory, moisturizer to maintain skin integrity.     Patient's DMI/DMII is managed by Primary Care Provider and/or Endocrinology Advanced Practice Provider. As per the most recent glycohemoglobin level, this patient is at goal.    Onychomycotic nail plates, as outlined above, are sharply debrided with double action nail nipper, and/or with the assistance of a mechanical rotary marisol for reduction of pains. Nails are reduced in terms of length, width and girth with removal of subungual debris to facilitate pain free weight bearing and ambulation. Elongated nails as outlined in the objective portion of this note, were trimmed to appropriate length for alleviation/reduction of pains as well. Follow up in approx. 9-12 weeks.    A TIME-OUT was completed. Oral, written and verbal consent were obtained from patient, prior to procedure being performed as discussed below.    The RLE 1st toe was anesthetized with a total of 3cc of 0.50% Marcaine w/ Epinephrine.  The area was then prepped appropriately with betadine paint. Following this, a [place] Elevator was utilized to free up the offending nail border, from the surrounding soft tissues.  Next, an English Anvil was used to split the nail from distal to proximal. Once freed from the soft tissue structures at the of the offending nail fold, a Curved Hemostat was used to remove the offending portion of nail.  A curette was then utilized to remove and and all debris from the border of the nail. Antibiotic cream and a sterile  bandage with Coban was placed on the digit.      Patient was informed of the possibility of recurrence of an ingrowing nail. Patient was given written and oral instructions for care including the office phone number if any questions or concerns arise.      Patient to start daily application of topical ABx. ointment with a bandage.     Patient to follow up in approx. 10 to 14 days, if needed.          No future appointments.

## 2023-10-08 DIAGNOSIS — M1A.4720 CHRONIC GOUT DUE TO OTHER SECONDARY CAUSE INVOLVING TOE OF LEFT FOOT WITHOUT TOPHUS: ICD-10-CM

## 2023-10-09 RX ORDER — ALLOPURINOL 300 MG/1
300 TABLET ORAL
Qty: 30 TABLET | Refills: 0 | Status: SHIPPED | OUTPATIENT
Start: 2023-10-09

## 2024-02-12 ENCOUNTER — OFFICE VISIT (OUTPATIENT)
Dept: PODIATRY | Facility: CLINIC | Age: 64
End: 2024-02-12
Payer: MEDICARE

## 2024-02-12 VITALS — BODY MASS INDEX: 38.28 KG/M2 | WEIGHT: 208 LBS | HEIGHT: 62 IN

## 2024-02-12 DIAGNOSIS — E11.9 COMPREHENSIVE DIABETIC FOOT EXAMINATION, TYPE 2 DM, ENCOUNTER FOR: Primary | ICD-10-CM

## 2024-02-12 DIAGNOSIS — E11.8 TYPE 2 DIABETES MELLITUS WITH COMPLICATION: ICD-10-CM

## 2024-02-12 DIAGNOSIS — M79.675 PAIN IN TOES OF BOTH FEET: ICD-10-CM

## 2024-02-12 DIAGNOSIS — B35.1 ONYCHOMYCOSIS: ICD-10-CM

## 2024-02-12 DIAGNOSIS — M79.674 PAIN IN TOES OF BOTH FEET: ICD-10-CM

## 2024-02-12 PROCEDURE — 1160F RVW MEDS BY RX/DR IN RCRD: CPT | Mod: CPTII,S$GLB,, | Performed by: PODIATRIST

## 2024-02-12 PROCEDURE — 4010F ACE/ARB THERAPY RXD/TAKEN: CPT | Mod: CPTII,S$GLB,, | Performed by: PODIATRIST

## 2024-02-12 PROCEDURE — 1159F MED LIST DOCD IN RCRD: CPT | Mod: CPTII,S$GLB,, | Performed by: PODIATRIST

## 2024-02-12 PROCEDURE — 99213 OFFICE O/P EST LOW 20 MIN: CPT | Mod: PBBFAC,25 | Performed by: PODIATRIST

## 2024-02-12 PROCEDURE — 99213 OFFICE O/P EST LOW 20 MIN: CPT | Mod: 25,S$GLB,, | Performed by: PODIATRIST

## 2024-02-12 PROCEDURE — 3008F BODY MASS INDEX DOCD: CPT | Mod: CPTII,S$GLB,, | Performed by: PODIATRIST

## 2024-02-12 PROCEDURE — 3288F FALL RISK ASSESSMENT DOCD: CPT | Mod: CPTII,S$GLB,, | Performed by: PODIATRIST

## 2024-02-12 PROCEDURE — 11720 DEBRIDE NAIL 1-5: CPT | Mod: PBBFAC | Performed by: PODIATRIST

## 2024-02-12 PROCEDURE — 1101F PT FALLS ASSESS-DOCD LE1/YR: CPT | Mod: CPTII,S$GLB,, | Performed by: PODIATRIST

## 2024-02-12 PROCEDURE — 99999 PR PBB SHADOW E&M-EST. PATIENT-LVL III: CPT | Mod: PBBFAC,,, | Performed by: PODIATRIST

## 2024-02-12 PROCEDURE — 11720 DEBRIDE NAIL 1-5: CPT | Mod: S$GLB,,, | Performed by: PODIATRIST

## 2024-02-12 NOTE — PROGRESS NOTES
"Subjective:       Patient ID: Hector Smith is a 63 y.o. male.    Chief Complaint: Foot Pain (Foot pain, pt was last seen by PCP Ochoa Lane on 1/30/24, pt is wearing tennis shoes and socks, nondiabetic )    HPI: Patient presents to the office with the chief complaint of painful, elongated, thickened and dystrophic nail plates to the B/L foot. This patient's PMD is Ochoa Lane MD. This patient last saw his/her primary care provider on 1/30/24. Patient is a well controlled DMII.     No results found for: "HGBA1C".    Review of patient's allergies indicates:  No Known Allergies    Past Medical History:   Diagnosis Date    Diabetes mellitus, type 2     Hyperlipidemia     Hypertension        History reviewed. No pertinent family history.    Social History     Socioeconomic History    Marital status:    Tobacco Use    Smoking status: Former    Smokeless tobacco: Never   Substance and Sexual Activity    Alcohol use: Never    Drug use: Never    Sexual activity: Not Currently       Past Surgical History:   Procedure Laterality Date    SPINAL FUSION N/A 2019       Review of Systems   Constitutional:  Negative for chills, fatigue and fever.   HENT:  Negative for hearing loss.    Eyes:  Negative for photophobia and visual disturbance.   Respiratory:  Negative for cough, chest tightness, shortness of breath and wheezing.    Cardiovascular:  Negative for chest pain and palpitations.   Gastrointestinal:  Negative for constipation, diarrhea, nausea and vomiting.   Endocrine: Negative for cold intolerance and heat intolerance.   Genitourinary:  Negative for flank pain.   Musculoskeletal:  Negative for neck pain and neck stiffness.   Skin:  Negative for wound.   Neurological:  Negative for light-headedness and headaches.   Psychiatric/Behavioral:  Negative for sleep disturbance.           Objective:   Ht 5' 2" (1.575 m)   Wt 94.3 kg (208 lb)   BMI 38.04 kg/m²     Physical Exam    LOWER EXTREMITY PHYSICAL " EXAMINATION  VASCULAR: The B/L LE DP and PT are 2/4. CFT is WNL. Warm to warm proximal to distal.     DERMATOLOGY: On the left foot, nails 1, 2, 3, 4 and 5 are suggestive of onychomycotic changes. On the right foot, nails 1, 2, 3, 4 and 5 are suggestive of onychomycotic changes. These nail plates are thickened, are dystrophic, chaulky in appearance and malodorous with substantial subungual debris. These nail plates are yellow to brown in appearance. The remaining nail plates are elongated and do not have suggestive clinical features of onychomycosis.    NEUROLOGY: Proprioception is intact, bilateral. Sensation to light touch is intact.     Assessment:     1. Type 2 diabetes mellitus with complication    2. Comprehensive diabetic foot examination, type 2 DM, encounter for    3. Onychomycosis    4. Pain in toes of both feet          Plan:     Type 2 diabetes mellitus with complication    Comprehensive diabetic foot examination, type 2 DM, encounter for    Onychomycosis    Pain in toes of both feet      I counseled the patient on his/her Diabetic Mellitus regarding today's clinical examination and objection findings. We did also discuss recent medication changes, pertinent labs and imaging evaluations and other medical consultation notes and progress notes. Greater than 50% of this visit was spent on counseling and coordination of care. Greater than 20 minutes of this appt. was spent on education about the diabetic foot, in relation to PVD and/or neuropathy, and the prevention of limb loss.     Shoe gear is inspected and wear and proper fit/type. Patient is reminded of the importance of good nutrition and blood sugar control to help prevent podiatric complications of diabetes. Patient instructed on proper foot hygeine. We discussed wearing proper shoe gear, daily foot inspections, never walking without protective shoe gear, never putting sharp instruments to feet.  Patient  will continue to monitor the areas daily,  inspect feet, wear protective shoe gear when ambulatory, moisturizer to maintain skin integrity.     Patient's DMI/DMII is managed by Primary Care Provider and/or Endocrinology Advanced Practice Provider. As per the most recent glycohemoglobin level, this patient is at goal.    Onychomycotic nail plates, as outlined above, are sharply debrided with double action nail nipper, and/or with the assistance of a mechanical rotary marisol for reduction of pains. Nails are reduced in terms of length, width and girth with removal of subungual debris to facilitate pain free weight bearing and ambulation. Elongated nails as outlined in the objective portion of this note, were trimmed to appropriate length for alleviation/reduction of pains as well. Follow up in approx. 4-6 months.    Protective Sensation (w/ 10 gram monofilament):  Right: Intact  Left: Intact    Visual Inspection:  Normal -  Bilateral    Pedal Pulses:   Right: Present  Left: Present    Posterior Tibialis Pulses:   Right:Present  Left: Present          Future Appointments   Date Time Provider Department Center   2/12/2024 10:45 AM Vicente Rouse DPM [facility]

## 2024-10-16 ENCOUNTER — OFFICE VISIT (OUTPATIENT)
Dept: PODIATRY | Facility: CLINIC | Age: 64
End: 2024-10-16
Payer: MEDICARE

## 2024-10-16 VITALS — WEIGHT: 207.88 LBS | BODY MASS INDEX: 38.25 KG/M2 | HEIGHT: 62 IN

## 2024-10-16 DIAGNOSIS — M79.675 PAIN IN TOES OF BOTH FEET: ICD-10-CM

## 2024-10-16 DIAGNOSIS — E11.8 TYPE 2 DIABETES MELLITUS WITH COMPLICATION: Primary | ICD-10-CM

## 2024-10-16 DIAGNOSIS — B35.1 ONYCHOMYCOSIS: ICD-10-CM

## 2024-10-16 DIAGNOSIS — M79.674 PAIN IN TOES OF BOTH FEET: ICD-10-CM

## 2024-10-16 PROCEDURE — 99999 PR PBB SHADOW E&M-EST. PATIENT-LVL III: CPT | Mod: PBBFAC,,, | Performed by: PODIATRIST

## 2024-10-16 NOTE — PROGRESS NOTES
Subjective:       Patient ID: Hector Smith is a 63 y.o. male.    Chief Complaint: Routine Foot Care (Annual foot care: pt denies pain, pt is diabetic, wearing tennis shoes )    HPI: Patient presents to the office with the chief complaint of painful, elongated, thickened and dystrophic nail plates to the B/L foot. This patient's PMD is Ochoa Lane MD. This patient last saw his/her primary care provider on 8/29/24. Patient is a well controlled DMII.     Hemoglobin A1C   Date Value Ref Range Status   07/11/2024 6.3 <=6.5 % Final     Comment:       This assay method is useful in the diagnosis of diabetes  mellitus, identification of patients at risk for developing  diabetes, and monitoring of patients with diabetes  mellitus.  Reference range information and glycemic goals  are based on recommendations from the ADA (American  Diabetes Association).    Hgb A1C Value                Glycemic Goal      <8%                          Less Stringent  <7%                          General (Non-Pregnant Adults)  <6.5%                        More Stringent  5.7% - 6.4%                  Increased risk for diabetes   .    Review of patient's allergies indicates:  No Known Allergies    Past Medical History:   Diagnosis Date    Diabetes mellitus, type 2     Hyperlipidemia     Hypertension        No family history on file.    Social History     Socioeconomic History    Marital status:    Tobacco Use    Smoking status: Former    Smokeless tobacco: Never   Substance and Sexual Activity    Alcohol use: Never    Drug use: Never    Sexual activity: Not Currently       Past Surgical History:   Procedure Laterality Date    SPINAL FUSION N/A 2019       Review of Systems   Constitutional:  Negative for chills, fatigue and fever.   HENT:  Negative for hearing loss.    Eyes:  Negative for photophobia and visual disturbance.   Respiratory:  Negative for cough, chest tightness, shortness of breath and wheezing.   "  Cardiovascular:  Negative for chest pain and palpitations.   Gastrointestinal:  Negative for constipation, diarrhea, nausea and vomiting.   Endocrine: Negative for cold intolerance and heat intolerance.   Genitourinary:  Negative for flank pain.   Musculoskeletal:  Negative for neck pain and neck stiffness.   Skin:  Negative for wound.   Neurological:  Negative for light-headedness and headaches.   Psychiatric/Behavioral:  Negative for sleep disturbance.           Objective:   Ht 5' 2" (1.575 m)   Wt 94.3 kg (207 lb 14.3 oz)   BMI 38.02 kg/m²     Physical Exam    LOWER EXTREMITY PHYSICAL EXAMINATION  VASCULAR: The B/L LE DP and PT are 2/4. CFT is WNL. Warm to warm proximal to distal.     DERMATOLOGY: On the left foot, nails 1, 2, 3, 4 and 5 are suggestive of onychomycotic changes. On the right foot, nails 1, 2, 3, 4 and 5 are suggestive of onychomycotic changes. These nail plates are thickened, are dystrophic, chaulky in appearance and malodorous with substantial subungual debris. These nail plates are yellow to brown in appearance. The remaining nail plates are elongated and do not have suggestive clinical features of onychomycosis.    NEUROLOGY: Proprioception is intact, bilateral. Sensation to light touch is intact.     Assessment:     1. Type 2 diabetes mellitus with complication    2. Onychomycosis    3. Pain in toes of both feet            Plan:     Type 2 diabetes mellitus with complication    Onychomycosis    Pain in toes of both feet      Onychomycotic nail plates, as outlined above, are sharply debrided with double action nail nipper, and/or with the assistance of a mechanical rotary marisol for reduction of pains. Nails are reduced in terms of length, width and girth with removal of subungual debris to facilitate pain free weight bearing and ambulation. Elongated nails as outlined in the objective portion of this note, were trimmed to appropriate length for alleviation/reduction of pains as well. Follow " up in approx. 4-6 months.              No future appointments.

## 2024-12-04 ENCOUNTER — OFFICE VISIT (OUTPATIENT)
Dept: UROLOGY | Facility: CLINIC | Age: 64
End: 2024-12-04
Payer: MEDICARE

## 2024-12-04 VITALS
HEIGHT: 62 IN | DIASTOLIC BLOOD PRESSURE: 71 MMHG | HEART RATE: 71 BPM | SYSTOLIC BLOOD PRESSURE: 115 MMHG | WEIGHT: 179.38 LBS | BODY MASS INDEX: 33.01 KG/M2

## 2024-12-04 DIAGNOSIS — N13.8 BENIGN PROSTATIC HYPERPLASIA WITH URINARY OBSTRUCTION: Primary | ICD-10-CM

## 2024-12-04 DIAGNOSIS — N40.1 BENIGN PROSTATIC HYPERPLASIA WITH URINARY OBSTRUCTION: Primary | ICD-10-CM

## 2024-12-04 PROCEDURE — 3074F SYST BP LT 130 MM HG: CPT | Mod: CPTII,S$GLB,, | Performed by: UROLOGY

## 2024-12-04 PROCEDURE — 3044F HG A1C LEVEL LT 7.0%: CPT | Mod: CPTII,S$GLB,, | Performed by: UROLOGY

## 2024-12-04 PROCEDURE — 1126F AMNT PAIN NOTED NONE PRSNT: CPT | Mod: CPTII,S$GLB,, | Performed by: UROLOGY

## 2024-12-04 PROCEDURE — 1160F RVW MEDS BY RX/DR IN RCRD: CPT | Mod: CPTII,S$GLB,, | Performed by: UROLOGY

## 2024-12-04 PROCEDURE — 1101F PT FALLS ASSESS-DOCD LE1/YR: CPT | Mod: CPTII,S$GLB,, | Performed by: UROLOGY

## 2024-12-04 PROCEDURE — 99999 PR PBB SHADOW E&M-EST. PATIENT-LVL III: CPT | Mod: PBBFAC,,, | Performed by: UROLOGY

## 2024-12-04 PROCEDURE — 3288F FALL RISK ASSESSMENT DOCD: CPT | Mod: CPTII,S$GLB,, | Performed by: UROLOGY

## 2024-12-04 PROCEDURE — 3078F DIAST BP <80 MM HG: CPT | Mod: CPTII,S$GLB,, | Performed by: UROLOGY

## 2024-12-04 PROCEDURE — 3008F BODY MASS INDEX DOCD: CPT | Mod: CPTII,S$GLB,, | Performed by: UROLOGY

## 2024-12-04 PROCEDURE — 4010F ACE/ARB THERAPY RXD/TAKEN: CPT | Mod: CPTII,S$GLB,, | Performed by: UROLOGY

## 2024-12-04 PROCEDURE — 99203 OFFICE O/P NEW LOW 30 MIN: CPT | Mod: S$GLB,,, | Performed by: UROLOGY

## 2024-12-04 PROCEDURE — 1159F MED LIST DOCD IN RCRD: CPT | Mod: CPTII,S$GLB,, | Performed by: UROLOGY

## 2024-12-04 RX ORDER — CEFDINIR 300 MG/1
300 CAPSULE ORAL 2 TIMES DAILY
Qty: 14 CAPSULE | Refills: 0 | Status: SHIPPED | OUTPATIENT
Start: 2024-12-04 | End: 2024-12-11

## 2024-12-04 NOTE — PROGRESS NOTES
Chief Complaint:   Encounter Diagnosis   Name Primary?    Benign prostatic hyperplasia with urinary obstruction Yes       HPI:  64-year-old gentleman who comes in with the acute urinary retention requiring Rodrigues catheterizations.  Apparently patient had an episode of this proximally 2 weeks ago, after 4 days the catheter was removed by the outside urologist and he was voiding well.  Prior to this no real complaints, only getting up 1 time per night.  No significant lower urinary tract symptoms on daily tamsulosin.  No dysuria, no gross hematuria, no microscopic hematuria, he does have a remote history of smoking.  Patient has no family history of urological cancers, his mother did have stones though.  Patient has had no previous urological history.  Unfortunately just a few days ago he again went into acute urinary retention requiring Rodrigues catheter.  Postvoid residual was 921 mL.  Patient since that time has continued to have with a Rodrigues catheter would like her to be removed, he is up to his tamsulosin to b.i.d. of note.  Patient does take Viagra 100 mg on occasion, no issues.  Patient is concerned that he did try a different alcoholic beverage and this could be related.    Allergies:  Adhesive and Suture, silk    Medications:  See MAR    Review of Systems:  General: No fever, chills, fatigability, or weight loss.  Skin: No rashes, itching, or changes in color or texture of skin.  Chest: Denies TAMEZ, cyanosis, wheezing, cough, and sputum production.  Abdomen: Appetite fine. No weight loss. Denies diarrhea, abdominal pain, hematemesis, or blood in stool.  Musculoskeletal: No joint stiffness or swelling. Denies back pain.  : As above.  All other review of systems negative.    PMH:   has a past medical history of Diabetes mellitus, type 2, Hyperlipidemia, and Hypertension.    PSH:   has a past surgical history that includes Spinal fusion (N/A, 2019).    FamHx: family history is not on file.    SocHx:  reports that  he has quit smoking. He has never used smokeless tobacco. He reports that he does not drink alcohol and does not use drugs.      Physical Exam:  Vitals:    12/04/24 1006   BP: 115/71   Pulse: 71     General: A&Ox3, no apparent distress, no deformities  Neck: No masses, normal ROM  Lungs: normal inspiration, no use of accessory muscles  Heart: normal pulse, no arrhythmias  Abdomen: Soft, NT, ND, no masses, no hernias, no hepatosplenomegaly  Skin: The skin is warm and dry. No jaundice.  Ext: No c/c/e.    Labs/Studies:   UA 3+ glucose 11/24   Voiding trial 130 mL instilled, 200 mL voided, PVR 42 mL 12/4/24  PSA 2.9 7/24  PSA 2.6 7/23    Impression/Plan:     1. BPH with obstruction- patient has passed his voiding trial today and will continue tamsulosin b.i.d..  Due to the instrumentation we will treat with 7 days of cefdinir, call with any issues prior to the next appointment.  Otherwise see me in 2 weeks with cystoscopy for possible preoperative evaluation.  At this point due to the acute urinary retention, we have discussed surgical management options.  We will be able to determine the appropriate therapy post cystoscopy.  Patient is aware that if he can not void, he will have to return for Rodrigues catheter placement.      2. Erectile dysfunction- occasionally usage of Viagra 100 mg with no issue.

## 2024-12-12 ENCOUNTER — PROCEDURE VISIT (OUTPATIENT)
Dept: UROLOGY | Facility: CLINIC | Age: 64
End: 2024-12-12
Payer: MEDICARE

## 2024-12-12 VITALS — SYSTOLIC BLOOD PRESSURE: 109 MMHG | DIASTOLIC BLOOD PRESSURE: 70 MMHG | HEART RATE: 70 BPM

## 2024-12-12 DIAGNOSIS — N13.8 BENIGN PROSTATIC HYPERPLASIA WITH URINARY OBSTRUCTION: Primary | ICD-10-CM

## 2024-12-12 DIAGNOSIS — N40.1 BENIGN PROSTATIC HYPERPLASIA WITH URINARY OBSTRUCTION: Primary | ICD-10-CM

## 2024-12-12 DIAGNOSIS — Z01.818 PRE-OP TESTING: ICD-10-CM

## 2024-12-12 RX ORDER — CIPROFLOXACIN 500 MG/1
500 TABLET ORAL
Status: COMPLETED | OUTPATIENT
Start: 2024-12-12 | End: 2024-12-12

## 2024-12-12 RX ORDER — LIDOCAINE HYDROCHLORIDE 20 MG/ML
JELLY TOPICAL
Status: COMPLETED | OUTPATIENT
Start: 2024-12-12 | End: 2024-12-12

## 2024-12-12 RX ORDER — CEFAZOLIN SODIUM 2 G/50ML
2 SOLUTION INTRAVENOUS
OUTPATIENT
Start: 2024-12-12

## 2024-12-12 RX ADMIN — CIPROFLOXACIN 500 MG: 500 TABLET ORAL at 02:12

## 2024-12-12 RX ADMIN — LIDOCAINE HYDROCHLORIDE 11 ML: 20 JELLY TOPICAL at 02:12

## 2024-12-12 NOTE — PROCEDURES
Procedures  Chief Complaint:   Encounter Diagnosis   Name Primary?    Benign prostatic hyperplasia with urinary obstruction Yes       HPI:    12/12/24- here today for preop cystoscopy.  No need for recurrent batres.    64-year-old gentleman who comes in with the acute urinary retention requiring Batres catheterizations.  Apparently patient had an episode of this proximally 2 weeks ago, after 4 days the catheter was removed by the outside urologist and he was voiding well.  Prior to this no real complaints, only getting up 1 time per night.  No significant lower urinary tract symptoms on daily tamsulosin.  No dysuria, no gross hematuria, no microscopic hematuria, he does have a remote history of smoking.  Patient has no family history of urological cancers, his mother did have stones though.  Patient has had no previous urological history.  Unfortunately just a few days ago he again went into acute urinary retention requiring Batres catheter.  Postvoid residual was 921 mL.  Patient since that time has continued to have with a Batres catheter would like her to be removed, he is up to his tamsulosin to b.i.d. of note.  Patient does take Viagra 100 mg on occasion, no issues.  Patient is concerned that he did try a different alcoholic beverage and this could be related.    Allergies:  Adhesive and Suture, silk    Medications:  See MAR    Review of Systems:  General: No fever, chills, fatigability, or weight loss.  Skin: No rashes, itching, or changes in color or texture of skin.  Chest: Denies TAMEZ, cyanosis, wheezing, cough, and sputum production.  Abdomen: Appetite fine. No weight loss. Denies diarrhea, abdominal pain, hematemesis, or blood in stool.  Musculoskeletal: No joint stiffness or swelling. Denies back pain.  : As above.  All other review of systems negative.    PMH:   has a past medical history of Diabetes mellitus, type 2, Hyperlipidemia, and Hypertension.    PSH:   has a past surgical history that includes  Spinal fusion (N/A, 2019).    FamHx: family history is not on file.    SocHx:  reports that he has quit smoking. He has never used smokeless tobacco. He reports that he does not drink alcohol and does not use drugs.      Physical Exam:  Vitals:    12/12/24 1420   BP: 109/70   Pulse: 70     General: A&Ox3, no apparent distress, no deformities  Neck: No masses, normal ROM  Lungs: normal inspiration, no use of accessory muscles  Heart: normal pulse, no arrhythmias  Abdomen: Soft, NT, ND, no masses, no hernias, no hepatosplenomegaly  Skin: The skin is warm and dry. No jaundice.  Ext: No c/c/e.  : 12/24- Test desc sarah beth, no abnormalities of epididymus. Normal penile and scrotal skin. Meatus normal.    Labs/Studies:   Voiding trial 130 mL instilled, 200 mL voided, PVR 42 mL 12/4/24  Cystoscopy lateral lobe coaptation, small median lobe 12/24  PSA 2.9 7/24  PSA 2.6 7/23    Procedure: Diagnostic Cystoscopy    Procedure in Detail: After proper consents were obtained, the patient was prepped and draped in normal sterile fashion for diagnostic cystoscopy. 5 ml of lidocaine jelly was instilled in the urethra. The flexible cystoscope was then introduced into the urethra, and advanced into the bladder under direct vision. The urethral mucosa appeared normal, and no strictures were noted. The sphincter appeared to be normal, and the veru montanum was unremarkable. The prostatic mucosa demonstrates lateral lobe coaptation with a small median lobe. The bladder neck was normal. Inspection of the interior of the bladder was then carried out. The trigone was unremarkable, with no mucosal lesions. The ureteral orifices were normal in position and configuration. Systematic inspection of the mucosa of the bladder it was then carried out, rotating the cystoscope so that all areas of the left and right lateral walls, the dome of the bladder, and the posterior wall were all visualized. The cystoscope was then advanced further into the  bladder, and maximum deflection of the scope was performed so that the bladder neck could be inspected. No mucosal lesions were noted there. The cystoscope was then removed, and the procedure terminated.     Findings: lateral lobe coaptation, small median lobe        Impression/Plan:     1. BPH with obstruction- he has not required another batres and is on tamsulosin b.i.d..  He has decided to pursue surgery and based on the cystoscopic findings today will proceed with a traditional TURP, please see below in regards to our discussion.  Call with any issues in the meantime.     2. Erectile dysfunction- occasionally usage of Viagra 100 mg with no issue.    The patient understands the risks, benefits and alternatives.  These include but are not limited to damage to the surrounding structures including the urethra, prostate, ureteral orifices, bladder and rectum.  Risk of hematuria, regrowth, recurrent obstruction requiring further procedures or medical therapy, infection, further bleeding requiring other procedures, persistent burning.  Risk of perforation requiring an open procedure.  Risk of the possible need for stents or longer term catheterization.  Patient understands a biopsy will be taken and may diagnose him with prostate cancer during the procedure.  Understanding that retrograde ejaculation will occur following the procedure.  Risk of heart attack, stroke, death, DVT and PE.  Patient understanding of all of the above has elected to pursue the procedure.

## 2024-12-22 ENCOUNTER — HOSPITAL ENCOUNTER (EMERGENCY)
Facility: HOSPITAL | Age: 64
Discharge: HOME | End: 2024-12-22
Attending: EMERGENCY MEDICINE
Payer: MEDICARE

## 2024-12-22 VITALS
TEMPERATURE: 98 F | BODY MASS INDEX: 32.76 KG/M2 | OXYGEN SATURATION: 96 % | RESPIRATION RATE: 16 BRPM | HEART RATE: 78 BPM | WEIGHT: 179.13 LBS | SYSTOLIC BLOOD PRESSURE: 108 MMHG | DIASTOLIC BLOOD PRESSURE: 62 MMHG

## 2024-12-22 DIAGNOSIS — R33.9 URINARY RETENTION: Primary | ICD-10-CM

## 2024-12-22 DIAGNOSIS — N40.0 BENIGN PROSTATIC HYPERPLASIA, UNSPECIFIED WHETHER LOWER URINARY TRACT SYMPTOMS PRESENT: ICD-10-CM

## 2024-12-22 DIAGNOSIS — N17.9 AKI (ACUTE KIDNEY INJURY): ICD-10-CM

## 2024-12-22 LAB
ALBUMIN SERPL BCP-MCNC: 4.5 G/DL (ref 3.5–5.2)
ALP SERPL-CCNC: 80 U/L (ref 40–150)
ALT SERPL W/O P-5'-P-CCNC: 78 U/L (ref 10–44)
ANION GAP SERPL CALC-SCNC: 14 MMOL/L (ref 8–16)
ANION GAP SERPL CALC-SCNC: 21 MMOL/L (ref 8–16)
AST SERPL-CCNC: 66 U/L (ref 10–40)
BACTERIA #/AREA URNS HPF: NORMAL /HPF
BASOPHILS # BLD AUTO: 0.01 K/UL (ref 0–0.2)
BASOPHILS NFR BLD: 0.1 % (ref 0–1.9)
BILIRUB SERPL-MCNC: 0.4 MG/DL (ref 0.1–1)
BILIRUB UR QL STRIP: NEGATIVE
BUN SERPL-MCNC: 23 MG/DL (ref 8–23)
BUN SERPL-MCNC: 24 MG/DL (ref 8–23)
CALCIUM SERPL-MCNC: 10.1 MG/DL (ref 8.7–10.5)
CALCIUM SERPL-MCNC: 9.1 MG/DL (ref 8.7–10.5)
CHLORIDE SERPL-SCNC: 107 MMOL/L (ref 95–110)
CHLORIDE SERPL-SCNC: 110 MMOL/L (ref 95–110)
CLARITY UR: CLEAR
CO2 SERPL-SCNC: 13 MMOL/L (ref 23–29)
CO2 SERPL-SCNC: 18 MMOL/L (ref 23–29)
COLOR UR: YELLOW
CREAT SERPL-MCNC: 1.6 MG/DL (ref 0.5–1.4)
CREAT SERPL-MCNC: 2.1 MG/DL (ref 0.5–1.4)
DIFFERENTIAL METHOD BLD: ABNORMAL
EOSINOPHIL # BLD AUTO: 0.1 K/UL (ref 0–0.5)
EOSINOPHIL NFR BLD: 1 % (ref 0–8)
ERYTHROCYTE [DISTWIDTH] IN BLOOD BY AUTOMATED COUNT: 15.9 % (ref 11.5–14.5)
EST. GFR  (NO RACE VARIABLE): 34 ML/MIN/1.73 M^2
EST. GFR  (NO RACE VARIABLE): 47 ML/MIN/1.73 M^2
GLUCOSE SERPL-MCNC: 120 MG/DL (ref 70–110)
GLUCOSE SERPL-MCNC: 89 MG/DL (ref 70–110)
GLUCOSE UR QL STRIP: ABNORMAL
HCT VFR BLD AUTO: 44.7 % (ref 40–54)
HGB BLD-MCNC: 14.6 G/DL (ref 14–18)
HGB UR QL STRIP: NEGATIVE
IMM GRANULOCYTES # BLD AUTO: 0.02 K/UL (ref 0–0.04)
IMM GRANULOCYTES NFR BLD AUTO: 0.3 % (ref 0–0.5)
KETONES UR QL STRIP: NEGATIVE
LEUKOCYTE ESTERASE UR QL STRIP: NEGATIVE
LYMPHOCYTES # BLD AUTO: 1.3 K/UL (ref 1–4.8)
LYMPHOCYTES NFR BLD: 17.9 % (ref 18–48)
MCH RBC QN AUTO: 29.2 PG (ref 27–31)
MCHC RBC AUTO-ENTMCNC: 32.7 G/DL (ref 32–36)
MCV RBC AUTO: 89 FL (ref 82–98)
MICROSCOPIC COMMENT: NORMAL
MONOCYTES # BLD AUTO: 0.4 K/UL (ref 0.3–1)
MONOCYTES NFR BLD: 5.6 % (ref 4–15)
NEUTROPHILS # BLD AUTO: 5.5 K/UL (ref 1.8–7.7)
NEUTROPHILS NFR BLD: 75.1 % (ref 38–73)
NITRITE UR QL STRIP: NEGATIVE
NRBC BLD-RTO: 0 /100 WBC
PH UR STRIP: 5 [PH] (ref 5–8)
PLATELET # BLD AUTO: 214 K/UL (ref 150–450)
PMV BLD AUTO: 9.2 FL (ref 9.2–12.9)
POTASSIUM SERPL-SCNC: 4.4 MMOL/L (ref 3.5–5.1)
POTASSIUM SERPL-SCNC: 4.8 MMOL/L (ref 3.5–5.1)
PROT SERPL-MCNC: 8 G/DL (ref 6–8.4)
PROT UR QL STRIP: ABNORMAL
RBC # BLD AUTO: 5 M/UL (ref 4.6–6.2)
SODIUM SERPL-SCNC: 141 MMOL/L (ref 136–145)
SODIUM SERPL-SCNC: 142 MMOL/L (ref 136–145)
SP GR UR STRIP: 1.01 (ref 1–1.03)
URN SPEC COLLECT METH UR: ABNORMAL
UROBILINOGEN UR STRIP-ACNC: NEGATIVE EU/DL
WBC # BLD AUTO: 7.32 K/UL (ref 3.9–12.7)
YEAST URNS QL MICRO: NORMAL

## 2024-12-22 PROCEDURE — 96360 HYDRATION IV INFUSION INIT: CPT

## 2024-12-22 PROCEDURE — 25000003 PHARM REV CODE 250: Performed by: EMERGENCY MEDICINE

## 2024-12-22 PROCEDURE — 51702 INSERT TEMP BLADDER CATH: CPT

## 2024-12-22 PROCEDURE — 85025 COMPLETE CBC W/AUTO DIFF WBC: CPT | Performed by: EMERGENCY MEDICINE

## 2024-12-22 PROCEDURE — 80053 COMPREHEN METABOLIC PANEL: CPT | Performed by: EMERGENCY MEDICINE

## 2024-12-22 PROCEDURE — 81000 URINALYSIS NONAUTO W/SCOPE: CPT | Performed by: EMERGENCY MEDICINE

## 2024-12-22 PROCEDURE — 99284 EMERGENCY DEPT VISIT MOD MDM: CPT | Mod: 25

## 2024-12-22 PROCEDURE — 80048 BASIC METABOLIC PNL TOTAL CA: CPT | Mod: XB | Performed by: EMERGENCY MEDICINE

## 2024-12-22 RX ORDER — SODIUM CHLORIDE 9 MG/ML
1000 INJECTION, SOLUTION INTRAVENOUS
Status: COMPLETED | OUTPATIENT
Start: 2024-12-22 | End: 2024-12-22

## 2024-12-22 RX ORDER — CIPROFLOXACIN 500 MG/1
500 TABLET ORAL 2 TIMES DAILY
Qty: 20 TABLET | Refills: 0 | Status: SHIPPED | OUTPATIENT
Start: 2024-12-22 | End: 2024-12-24 | Stop reason: CLARIF

## 2024-12-22 RX ADMIN — SODIUM CHLORIDE 1000 ML: 9 INJECTION, SOLUTION INTRAVENOUS at 06:12

## 2024-12-22 NOTE — ED PROVIDER NOTES
SCRIBE #1 NOTE: I, Harshal Rosas and Merced Ortiz, am scribing for, and in the presence of, Jordan Doan Jr., MD. I have scribed the entire note.       History     Chief Complaint   Patient presents with    Urinary Retention     Pt c/o urinary retention, hx BPH has TURP scheduled January 21. Has only urinated very small amount      Review of patient's allergies indicates:   Allergen Reactions    Adhesive Rash     Silk Tape    Suture, silk Rash         History of Present Illness     HPI    12/22/2024, 4:49 PM  History obtained from the patient      History of Present Illness: Hector Smith is a 64 y.o. male patient with a PMHx of T2DM, HLD, BPH, and HTN who presents to the Emergency Department for evaluation of urinary retention which onset PTA. Pt is scheduled for TURP on 1/21/25 for treatment of BPH. Symptoms are constant and moderate in severity. No mitigating or exacerbating factors reported. Associated sxs include urgency. Patient denies any n/v, fever, hematuria, and all other sxs at this time. Prior Tx includes Flomax. No further complaints or concerns at this time.       Arrival mode: Personal vehicle     PCP: Ochoa Lane MD        Past Medical History:  Past Medical History:   Diagnosis Date    Diabetes mellitus, type 2     Hyperlipidemia     Hypertension        Past Surgical History:  Past Surgical History:   Procedure Laterality Date    SPINAL FUSION N/A 2019         Family History:  No family history on file.    Social History:  Social History     Tobacco Use    Smoking status: Former    Smokeless tobacco: Never   Substance and Sexual Activity    Alcohol use: Never    Drug use: Never    Sexual activity: Not Currently        Review of Systems     Review of Systems   Constitutional:  Negative for fever.   HENT:  Negative for sore throat.    Respiratory:  Negative for shortness of breath.    Cardiovascular:  Negative for chest pain.   Gastrointestinal:  Negative for nausea and vomiting.    Genitourinary:  Positive for difficulty urinating and urgency. Negative for hematuria.   Musculoskeletal:  Negative for back pain.   Skin:  Negative for rash.   Neurological:  Negative for weakness.   Hematological:  Does not bruise/bleed easily.   All other systems reviewed and are negative.       Physical Exam     Initial Vitals [12/22/24 1545]   BP Pulse Resp Temp SpO2   128/73 78 16 98.3 °F (36.8 °C) 97 %      MAP       --          Physical Exam  Nursing Notes and Vital Signs Reviewed.  Constitutional: Patient is in no acute distress. Well-developed and well-nourished.  Head: Atraumatic. Normocephalic.  Eyes: PERRL. EOM intact. Conjunctivae are not pale. No scleral icterus.  ENT: Mucous membranes are moist. Oropharynx is clear and symmetric.    Neck: Supple. Full ROM. No lymphadenopathy.  Cardiovascular: Regular rate. Regular rhythm. No murmurs, rubs, or gallops. Distal pulses are 2+ and symmetric.  Pulmonary/Chest: No respiratory distress. Clear to auscultation bilaterally. No wheezing or rales.  Abdominal: Lower abdominal distention with tenderness. No rebound, guarding, or rigidity. Good bowel sounds.  Genitourinary: No CVA tenderness. Urgency.  Musculoskeletal: Moves all extremities. No obvious deformities. No edema. No calf tenderness.  Skin: Warm and dry.  Neurological:  Alert, awake, and appropriate.  Normal speech.  No acute focal neurological deficits are appreciated.  Psychiatric: Normal affect. Good eye contact. Appropriate in content.     ED Course   Procedures  ED Vital Signs:  Vitals:    12/22/24 1545 12/22/24 1900 12/22/24 1930 12/22/24 2000   BP: 128/73 109/62 115/64 112/65   Pulse: 78 80 78 82   Resp: 16 16 16 16   Temp: 98.3 °F (36.8 °C)      TempSrc: Oral      SpO2: 97% (!) 94% 95% 98%   Weight: 81.2 kg (179 lb 1.6 oz)          Abnormal Lab Results:  Labs Reviewed   CBC W/ AUTO DIFFERENTIAL - Abnormal       Result Value    WBC 7.32      RBC 5.00      Hemoglobin 14.6      Hematocrit 44.7       MCV 89      MCH 29.2      MCHC 32.7      RDW 15.9 (*)     Platelets 214      MPV 9.2      Immature Granulocytes 0.3      Gran # (ANC) 5.5      Immature Grans (Abs) 0.02      Lymph # 1.3      Mono # 0.4      Eos # 0.1      Baso # 0.01      nRBC 0      Gran % 75.1 (*)     Lymph % 17.9 (*)     Mono % 5.6      Eosinophil % 1.0      Basophil % 0.1      Differential Method Automated     COMPREHENSIVE METABOLIC PANEL - Abnormal    Sodium 141      Potassium 4.8      Chloride 107      CO2 13 (*)     Glucose 120 (*)     BUN 23      Creatinine 2.1 (*)     Calcium 10.1      Total Protein 8.0      Albumin 4.5      Total Bilirubin 0.4      Alkaline Phosphatase 80      AST 66 (*)     ALT 78 (*)     eGFR 34 (*)     Anion Gap 21 (*)    URINALYSIS - Abnormal    Specimen UA Urine, Clean Catch      Color, UA Yellow      Appearance, UA Clear      pH, UA 5.0      Specific Gravity, UA 1.015      Protein, UA Trace (*)     Glucose, UA 4+ (*)     Ketones, UA Negative      Bilirubin (UA) Negative      Occult Blood UA Negative      Nitrite, UA Negative      Urobilinogen, UA Negative      Leukocytes, UA Negative     URINALYSIS MICROSCOPIC    Bacteria None      Yeast, UA None      Microscopic Comment SEE COMMENT     BASIC METABOLIC PANEL        All Lab Results:  Results for orders placed or performed during the hospital encounter of 12/22/24   Urinalysis    Collection Time: 12/22/24  4:51 PM   Result Value Ref Range    Specimen UA Urine, Clean Catch     Color, UA Yellow Yellow, Straw, Lary    Appearance, UA Clear Clear    pH, UA 5.0 5.0 - 8.0    Specific Gravity, UA 1.015 1.005 - 1.030    Protein, UA Trace (A) Negative    Glucose, UA 4+ (A) Negative    Ketones, UA Negative Negative    Bilirubin (UA) Negative Negative    Occult Blood UA Negative Negative    Nitrite, UA Negative Negative    Urobilinogen, UA Negative <2.0 EU/dL    Leukocytes, UA Negative Negative   Urinalysis Microscopic    Collection Time: 12/22/24  4:51 PM   Result Value Ref  Range    Bacteria None None-Occ /hpf    Yeast, UA None None    Microscopic Comment SEE COMMENT    CBC auto differential    Collection Time: 12/22/24  5:10 PM   Result Value Ref Range    WBC 7.32 3.90 - 12.70 K/uL    RBC 5.00 4.60 - 6.20 M/uL    Hemoglobin 14.6 14.0 - 18.0 g/dL    Hematocrit 44.7 40.0 - 54.0 %    MCV 89 82 - 98 fL    MCH 29.2 27.0 - 31.0 pg    MCHC 32.7 32.0 - 36.0 g/dL    RDW 15.9 (H) 11.5 - 14.5 %    Platelets 214 150 - 450 K/uL    MPV 9.2 9.2 - 12.9 fL    Immature Granulocytes 0.3 0.0 - 0.5 %    Gran # (ANC) 5.5 1.8 - 7.7 K/uL    Immature Grans (Abs) 0.02 0.00 - 0.04 K/uL    Lymph # 1.3 1.0 - 4.8 K/uL    Mono # 0.4 0.3 - 1.0 K/uL    Eos # 0.1 0.0 - 0.5 K/uL    Baso # 0.01 0.00 - 0.20 K/uL    nRBC 0 0 /100 WBC    Gran % 75.1 (H) 38.0 - 73.0 %    Lymph % 17.9 (L) 18.0 - 48.0 %    Mono % 5.6 4.0 - 15.0 %    Eosinophil % 1.0 0.0 - 8.0 %    Basophil % 0.1 0.0 - 1.9 %    Differential Method Automated    Comprehensive metabolic panel    Collection Time: 12/22/24  5:10 PM   Result Value Ref Range    Sodium 141 136 - 145 mmol/L    Potassium 4.8 3.5 - 5.1 mmol/L    Chloride 107 95 - 110 mmol/L    CO2 13 (L) 23 - 29 mmol/L    Glucose 120 (H) 70 - 110 mg/dL    BUN 23 8 - 23 mg/dL    Creatinine 2.1 (H) 0.5 - 1.4 mg/dL    Calcium 10.1 8.7 - 10.5 mg/dL    Total Protein 8.0 6.0 - 8.4 g/dL    Albumin 4.5 3.5 - 5.2 g/dL    Total Bilirubin 0.4 0.1 - 1.0 mg/dL    Alkaline Phosphatase 80 40 - 150 U/L    AST 66 (H) 10 - 40 U/L    ALT 78 (H) 10 - 44 U/L    eGFR 34 (A) >60 mL/min/1.73 m^2    Anion Gap 21 (H) 8 - 16 mmol/L         Imaging Results:  Imaging Results    None                The Emergency Provider reviewed the vital signs and test results, which are outlined above.     ED Discussion     8:04 PM: Reassessed pt at this time.Discussed with pt all pertinent ED information and results. Discussed pt dx and plan of tx. Gave pt all f/u and return to the ED instructions. All questions and concerns were addressed at  this time. Pt expresses understanding of information and instructions, and is comfortable with plan to discharge. Pt is stable for discharge.    I discussed with patient and/or family/caretaker that evaluation in the ED does not suggest any emergent or life threatening medical conditions requiring immediate intervention beyond what was provided in the ED, and I believe patient is safe for discharge.  Regardless, an unremarkable evaluation in the ED does not preclude the development or presence of a serious of life threatening condition. As such, patient was instructed to return immediately for any worsening or change in current symptoms.           Medical Decision Making  Amount and/or Complexity of Data Reviewed  Labs: ordered. Decision-making details documented in ED Course.  Discussion of management or test interpretation with external provider(s): 4:56 PM: Discussed pt's case with Dr. Valdez (Urology) who agrees with placing batres, checking UA, and checking renal function.    6:41 PM: Discussed pt's case with Dr. Dixon (Nephrology) who recommends giving a liter of fluids and repeating creatnine.    Risk  Prescription drug management.                ED Medication(s):  Medications   0.9% NaCl infusion (0 mLs Intravenous Stopped 12/22/24 1953)       New Prescriptions    CIPROFLOXACIN HCL (CIPRO) 500 MG TABLET    Take 1 tablet (500 mg total) by mouth 2 (two) times daily. for 10 days        Follow-up Information       Schedule an appointment as soon as possible for a visit  with Ochoa Lane MD.    Specialty: Family Medicine  Why: this week for recheck and to repeat yoru blood work to ensure your kidney function has normalized  Contact information:  [address] [facility] [id number] [phone]               Ron Anguiano MD.    Specialty: Urology  Why: to inform dr dominguez of your urine retention placement of batres catheter here today  Contact information:  [address]  [facility] [address]  [phone]                                 Scribe Attestation:   Scribe #1: I performed the above scribed service and the documentation accurately describes the services I performed. I attest to the accuracy of the note.     Attending:   Physician Attestation Statement for Scribe #1: I, Jordan Doan Jr., MD, personally performed the services described in this documentation, as scribed by Harshal Rosas and Merced Ortiz, in my presence, and it is both accurate and complete.           Clinical Impression       ICD-10-CM ICD-9-CM   1. Urinary retention  R33.9 788.20   2. Benign prostatic hyperplasia, unspecified whether lower urinary tract symptoms present  N40.0 600.00   3. ANASTACIO (acute kidney injury)  N17.9 584.9       Disposition:   Disposition: Discharged  Condition: Stable       Jordan Doan Jr., MD  12/22/24 2035

## 2024-12-22 NOTE — Clinical Note
"Hector"Caterina Smith was seen and treated in our emergency department on 12/22/2024.  He may return to work on 12/30/2024.       If you have any questions or concerns, please don't hesitate to call.      Yudith Massey RN    "

## 2024-12-23 NOTE — DISCHARGE INSTRUCTIONS
Take cipro as directed to prevent infection while using batres catheter    Call and notify Dr Olson's office regarding your urine retention     Make appt to see Dr Hirsch your primary care physician to have your blood work rechecked to ensure htat yoru kidney function has normalized

## 2024-12-24 RX ORDER — NITROFURANTOIN 25; 75 MG/1; MG/1
100 CAPSULE ORAL 2 TIMES DAILY
Qty: 10 CAPSULE | Refills: 0 | Status: SHIPPED | OUTPATIENT
Start: 2024-12-24 | End: 2024-12-29

## 2025-01-02 ENCOUNTER — HOSPITAL ENCOUNTER (OUTPATIENT)
Dept: CARDIOLOGY | Facility: HOSPITAL | Age: 65
Discharge: HOME | End: 2025-01-02
Attending: UROLOGY
Payer: MEDICARE

## 2025-01-02 ENCOUNTER — CLINICAL SUPPORT (OUTPATIENT)
Dept: UROLOGY | Facility: CLINIC | Age: 65
End: 2025-01-02
Payer: MEDICARE

## 2025-01-02 DIAGNOSIS — N40.1 BENIGN PROSTATIC HYPERPLASIA WITH URINARY OBSTRUCTION: Primary | ICD-10-CM

## 2025-01-02 DIAGNOSIS — N13.8 BENIGN PROSTATIC HYPERPLASIA WITH URINARY OBSTRUCTION: Primary | ICD-10-CM

## 2025-01-02 DIAGNOSIS — Z01.818 PRE-OP TESTING: ICD-10-CM

## 2025-01-02 LAB
OHS QRS DURATION: 96 MS
OHS QTC CALCULATION: 479 MS

## 2025-01-02 PROCEDURE — 93005 ELECTROCARDIOGRAM TRACING: CPT

## 2025-01-02 PROCEDURE — 93010 ELECTROCARDIOGRAM REPORT: CPT | Mod: ,,, | Performed by: INTERNAL MEDICINE

## 2025-01-02 NOTE — PROGRESS NOTES
..Voiding trial performed per orders of MD Lassiter .  250cc sterile water instilled via 60cc syringe per gravity; 10cc balloon deflated and #16fr batres removed without difficulty; patient tolerated procedure well. Patient was able to void 200cc thus passing voiding trial. PVR was 80ml. Provider was notified and instructed to keep batres out.

## 2025-01-03 ENCOUNTER — HOSPITAL ENCOUNTER (EMERGENCY)
Facility: HOSPITAL | Age: 65
Discharge: HOME | End: 2025-01-03
Attending: EMERGENCY MEDICINE
Payer: MEDICARE

## 2025-01-03 VITALS
WEIGHT: 180 LBS | SYSTOLIC BLOOD PRESSURE: 118 MMHG | HEART RATE: 70 BPM | OXYGEN SATURATION: 97 % | BODY MASS INDEX: 33.13 KG/M2 | DIASTOLIC BLOOD PRESSURE: 64 MMHG | TEMPERATURE: 98 F | HEIGHT: 62 IN | RESPIRATION RATE: 14 BRPM

## 2025-01-03 DIAGNOSIS — R33.8 ACUTE URINARY RETENTION: Primary | ICD-10-CM

## 2025-01-03 LAB
BACTERIA #/AREA URNS HPF: NORMAL /HPF
BILIRUB UR QL STRIP: NEGATIVE
CLARITY UR: CLEAR
COLOR UR: YELLOW
GLUCOSE UR QL STRIP: ABNORMAL
HGB UR QL STRIP: NEGATIVE
KETONES UR QL STRIP: NEGATIVE
LEUKOCYTE ESTERASE UR QL STRIP: ABNORMAL
MICROSCOPIC COMMENT: NORMAL
NITRITE UR QL STRIP: NEGATIVE
PH UR STRIP: 6 [PH] (ref 5–8)
PROT UR QL STRIP: ABNORMAL
SP GR UR STRIP: 1.02 (ref 1–1.03)
URN SPEC COLLECT METH UR: ABNORMAL
UROBILINOGEN UR STRIP-ACNC: NEGATIVE EU/DL
WBC #/AREA URNS HPF: 0 /HPF (ref 0–5)
YEAST URNS QL MICRO: NORMAL

## 2025-01-03 PROCEDURE — 51702 INSERT TEMP BLADDER CATH: CPT

## 2025-01-03 PROCEDURE — 81000 URINALYSIS NONAUTO W/SCOPE: CPT | Performed by: EMERGENCY MEDICINE

## 2025-01-03 PROCEDURE — 99283 EMERGENCY DEPT VISIT LOW MDM: CPT | Mod: 25

## 2025-01-03 NOTE — PRE ADMISSION SCREENING
Pre op instructions reviewed with patient over telephone, verbalized understanding.    To confirm, Surgery is scheduled on 1/06/25, please arrive at 12:30pm. We will call you late afternoon if your arrival time changes.    *Please report to the [facility] (1st Floor) located off of [facility] (2nd Entrance/Building on the left, in front of the flag pole).  Address: [address] [place] [address] [place]. [address]      INSTRUCTIONS IMPORTANT!!!  DO NOT Eat, Drink, or Smoke after 12 midnight unless instructed otherwise by your Surgeon. OK to brush teeth, no gum, candy or mints!    >>>MEDICATION INSTRUCTIONS<<<: Morning of Surgery, take small sip of water with ONLY these medications:  Allopurinol  Metoprolol  Flomax      *Blood Thinners: PLEASE CONTINUE HOLDING  PLAVIX PRIOR TO SURGERY, per Physician Instructions! Call your Surgeon office to inquire about any questions regarding your blood thinner medication.    - PLEASE DO NOT TAKE YOUR AFTERNOON DOSE OF JANUMET PRIOR TO SURGERY    *Diabetic/ Prediabetic Patients: !!!If you take diabetic or weight loss medication, Do NOT take morning of surgery unless instructed by Doctor!!!  Metformin to be stopped 24 hrs prior to surgery.   Long Acting Insulin Instructions: HOLD the night before surgery unless instructed differently by Provider!    If your are taking Ozempic/ Mounjaro/ Wegovy/ Trulicity/ Semaglutide injections or weight loss medication, such as Phentermine,  please notify us immediately as they must be stopped 7 days prior to surgery.    !!!STOP ALL Aspirins, NSAIDS, WEIGHT LOSS INJECTIONS/PILLS, Herbal supplements, & Vitamins 7 DAYS BEFORE SURGERY!!!    ____  Avoid Alcoholic beverages 3 days prior to surgery, as it can thin the blood.  ____  NO Acrylic/fake nails or nail polish worn day of surgery (specifically hand/arm & foot surgeries).  ____  NO powder, lotions, deodorants, oils or cream on body.  ____  Remove all jewelry & piercings & foreign  objects before arrival & leave at home.  ____  Remove Dentures, Hearing Aids & Contact Lens prior to surgery.  ____  Bring photo ID and insurance information to hospital (Leave Valuables at Home).  ____  If going home the same day, arrange for a ride home. You will not be able to drive for 24 hrs if Anesthesia was used.   ____  Males: Stop ED medications (Viagra, Cialis) 24 hrs prior to surgery.  ____  Wear clean, loose fitting clothing to allow for dressings/ bandages.      Bathing Instructions:    -Shower with anti-bacterial Soap (Hibiclens or Dial) the night before surgery and the morning of.   -Do not use Hibiclens on your face or genitals.   -Apply clean clothes after shower.  -Do not shave your face or body 3 days prior to surgery unless instructed otherwise by your Surgeon.    [facility]/Ride Policy:  Only 2 adults allowed in pre op/recovery area during your procedure. You MUST HAVE A RIDE HOME from a responsible adult that you know and trust. Medical Transport, Uber or Lyft can ONLY be used if patient has a responsible adult to accompany them during ride home.       *Signs and symptoms of Infection Before or After Surgery:               !!!If you experience any fever, chills, nausea/ vomiting, foul odor/ excessive drainage from surgical site, flu-like symptoms, new wounds or cuts, PLEASE CALL THE SURGEON OFFICE at [phone] or SEND MESSAGE THROUGH [employer] [facility] PORTAL!!!     *If you are running late the morning of surgery, please call the Hospital Surgery Dept @ [phone].     *Billing questions:  [phone]     Thank you,  -[facility] Pre Admit Dept.  [phone] Hernandez   or [phone]  M-F 7:30 am-4:00 pm (Closed Major Holidays)

## 2025-01-03 NOTE — ED PROVIDER NOTES
SCRIBE #1 NOTE: I, Vipul Hendrix, am scribing for, and in the presence of, Salty Martínez MD. I have scribed the entire note.       History     Chief Complaint   Patient presents with    Urinary Retention     Had catheter removed yesterday, has only been able to urinate once. Was told to return to ED for catheter placement to remain until surgery. Surgery scheduled Monday for urinary retention and bladder mass. Pt able to put out approximately 5mL for urinalysis      Review of patient's allergies indicates:   Allergen Reactions    Adhesive Rash     Silk Tape    Suture, silk Rash         History of Present Illness     HPI    1/3/2025, 4:35 AM  History obtained from the patient      History of Present Illness: Hector Smith is a 64 y.o. male patient with a PMHx of T2DM, HLD, and HTN who presents to the Emergency Department for evaluation of urinary retention which onset gradually yesterday. Associated sxs include abdominal pain. Symptoms are constant and moderate in severity. No mitigating or exacerbating factors reported. Pt had catheter removed yesterday and was able to urinate after the removal, however, once the pt got home he was unable to urinate again. Pt was told to come to ED for catheter placement and to let it remain until surgery next Monday. Pt is having surgery next Monday for urinary retention and a bladder mass.  Patient denies all other sxs at this time. No prior Tx reported. No further complaints or concerns at this time.       Arrival mode: Personal vehicle    PCP: Ochoa Lane MD        Past Medical History:  Past Medical History:   Diagnosis Date    Diabetes mellitus, type 2     Hyperlipidemia     Hypertension        Past Surgical History:  Past Surgical History:   Procedure Laterality Date    SPINAL FUSION N/A 2019         Family History:  No family history on file.    Social History:  Social History     Tobacco Use    Smoking status: Former    Smokeless tobacco: Never   Substance and  Sexual Activity    Alcohol use: Never    Drug use: Never    Sexual activity: Not Currently        Review of Systems     Review of Systems   Constitutional:  Negative for fever.   HENT:  Negative for sore throat.    Respiratory:  Negative for shortness of breath.    Cardiovascular:  Negative for chest pain.   Gastrointestinal:  Positive for abdominal pain. Negative for nausea.   Genitourinary:  Negative for dysuria.        (+) urinary retention   Musculoskeletal:  Negative for back pain.   Skin:  Negative for rash.   Neurological:  Negative for weakness.   Hematological:  Does not bruise/bleed easily.   All other systems reviewed and are negative.       Physical Exam     Initial Vitals [01/03/25 0402]   BP Pulse Resp Temp SpO2   118/64 70 14 98.1 °F (36.7 °C) 97 %      MAP       --          Physical Exam  Nursing Notes and Vital Signs Reviewed.  Constitutional: Patient is in no acute distress. Well-developed and well-nourished.  Head: Atraumatic. Normocephalic.  Eyes: PERRL. EOM intact. Conjunctivae are not pale. No scleral icterus.  ENT: Mucous membranes are moist. Oropharynx is clear and symmetric.    Neck: Supple. Full ROM. No lymphadenopathy.  Cardiovascular: Regular rate. Regular rhythm. No murmurs, rubs, or gallops. Distal pulses are 2+ and symmetric.  Pulmonary/Chest: No respiratory distress. Clear to auscultation bilaterally. No wheezing or rales.  Abdominal: Soft and non distended.  There is suprapubic tenderness.  No rebound, guarding, or rigidity. Good bowel sounds.  Genitourinary: No CVA tenderness  Musculoskeletal: Moves all extremities. No obvious deformities. No edema. No calf tenderness.  Skin: Warm and dry.  Neurological:  Alert, awake, and appropriate.  Normal speech.  No acute focal neurological deficits are appreciated.  Psychiatric: Normal affect. Good eye contact. Appropriate in content.     ED Course   Procedures  ED Vital Signs:  Vitals:    01/03/25 0402   BP: 118/64   Pulse: 70   Resp: 14  "  Temp: 98.1 °F (36.7 °C)   TempSrc: Oral   SpO2: 97%   Weight: 81.6 kg (180 lb)   Height: 5' 2" (1.575 m)       Abnormal Lab Results:  Labs Reviewed   URINALYSIS, REFLEX TO URINE CULTURE - Abnormal       Result Value    Specimen UA Urine, Clean Catch      Color, UA Yellow      Appearance, UA Clear      pH, UA 6.0      Specific Gravity, UA 1.025      Protein, UA Trace (*)     Glucose, UA 4+ (*)     Ketones, UA Negative      Bilirubin (UA) Negative      Occult Blood UA Negative      Nitrite, UA Negative      Urobilinogen, UA Negative      Leukocytes, UA Trace (*)     Narrative:     Specimen Source->Urine   URINALYSIS MICROSCOPIC    WBC, UA 0      Bacteria None      Yeast, UA None      Microscopic Comment SEE COMMENT      Narrative:     Specimen Source->Urine        All Lab Results:  Results for orders placed or performed during the hospital encounter of 01/03/25   Urinalysis, Reflex to Urine Culture Urine, Clean Catch    Collection Time: 01/03/25  4:03 AM    Specimen: Urine   Result Value Ref Range    Specimen UA Urine, Clean Catch     Color, UA Yellow Yellow, Straw, Lary    Appearance, UA Clear Clear    pH, UA 6.0 5.0 - 8.0    Specific Gravity, UA 1.025 1.005 - 1.030    Protein, UA Trace (A) Negative    Glucose, UA 4+ (A) Negative    Ketones, UA Negative Negative    Bilirubin (UA) Negative Negative    Occult Blood UA Negative Negative    Nitrite, UA Negative Negative    Urobilinogen, UA Negative <2.0 EU/dL    Leukocytes, UA Trace (A) Negative   Urinalysis Microscopic    Collection Time: 01/03/25  4:03 AM   Result Value Ref Range    WBC, UA 0 0 - 5 /hpf    Bacteria None None-Occ /hpf    Yeast, UA None None    Microscopic Comment SEE COMMENT         Imaging Results:  Imaging Results    None             The Emergency Provider reviewed the vital signs and test results, which are outlined above.     ED Discussion       4:59 AM: Reassessed pt at this time. Pt was able to produce 700 cc of strong colored urine out. " Discussed with pt all pertinent ED information and results. Discussed pt dx and plan of tx. Gave pt all f/u and return to the ED instructions. All questions and concerns were addressed at this time. Pt expresses understanding of information and instructions, and is comfortable with plan to discharge. Pt is stable for discharge.    I discussed with patient and/or family/caretaker that evaluation in the ED does not suggest any emergent or life threatening medical conditions requiring immediate intervention beyond what was provided in the ED, and I believe patient is safe for discharge.  Regardless, an unremarkable evaluation in the ED does not preclude the development or presence of a serious of life threatening condition. As such, patient was instructed to return immediately for any worsening or change in current symptoms.        Medical Decision Making  Amount and/or Complexity of Data Reviewed  Labs: ordered. Decision-making details documented in ED Course.                ED Medication(s):  Medications - No data to display    Discharge Medication List as of 1/3/2025  4:59 AM           Follow-up Information       Ron Lassiter MD In 3 days.    Specialty: Urology  Contact information:  [address]  [place] [address] [phone]               [facility] - Emergency Dept..    Specialty: Emergency Medicine  Why: As needed, If symptoms worsen  Contact information:  [address] [facility] [address] [phone]                               Scribe Attestation:   Scribe #1: I performed the above scribed service and the documentation accurately describes the services I performed. I attest to the accuracy of the note.     Attending:   Physician Attestation Statement for Scribe #1: I, Salty Martínez MD, personally performed the services described in this documentation, as scribed by Vipul Hendrix, in my presence, and it is both accurate and complete.           Clinical Impression        ICD-10-CM ICD-9-CM   1. Acute urinary retention  R33.8 788.29       Disposition:   Disposition: Discharged  Condition: Stable        Salty Martínez MD  01/03/25 0557

## 2025-01-06 ENCOUNTER — ANESTHESIA (OUTPATIENT)
Dept: SURGERY | Facility: HOSPITAL | Age: 65
End: 2025-01-06
Payer: MEDICARE

## 2025-01-06 ENCOUNTER — ANESTHESIA EVENT (OUTPATIENT)
Dept: SURGERY | Facility: HOSPITAL | Age: 65
End: 2025-01-06
Payer: MEDICARE

## 2025-01-06 ENCOUNTER — HOSPITAL ENCOUNTER (OUTPATIENT)
Facility: HOSPITAL | Age: 65
Discharge: HOME | End: 2025-01-08
Attending: UROLOGY | Admitting: UROLOGY
Payer: MEDICARE

## 2025-01-06 DIAGNOSIS — N40.1 BENIGN PROSTATIC HYPERPLASIA WITH URINARY OBSTRUCTION: Primary | ICD-10-CM

## 2025-01-06 DIAGNOSIS — N13.8 BENIGN PROSTATIC HYPERPLASIA WITH URINARY OBSTRUCTION: Primary | ICD-10-CM

## 2025-01-06 PROCEDURE — 37000008 HC ANESTHESIA 1ST 15 MINUTES: Performed by: UROLOGY

## 2025-01-06 PROCEDURE — 88341 IMHCHEM/IMCYTCHM EA ADD ANTB: CPT | Performed by: PATHOLOGY

## 2025-01-06 PROCEDURE — 63600175 PHARM REV CODE 636 W HCPCS: Performed by: STUDENT IN AN ORGANIZED HEALTH CARE EDUCATION/TRAINING PROGRAM

## 2025-01-06 PROCEDURE — 71000033 HC RECOVERY, INTIAL HOUR: Performed by: UROLOGY

## 2025-01-06 PROCEDURE — 36000706: Performed by: UROLOGY

## 2025-01-06 PROCEDURE — 37000009 HC ANESTHESIA EA ADD 15 MINS: Performed by: UROLOGY

## 2025-01-06 PROCEDURE — 36000707: Performed by: UROLOGY

## 2025-01-06 PROCEDURE — 71000039 HC RECOVERY, EACH ADD'L HOUR: Performed by: UROLOGY

## 2025-01-06 PROCEDURE — 25000003 PHARM REV CODE 250

## 2025-01-06 PROCEDURE — 88342 IMHCHEM/IMCYTCHM 1ST ANTB: CPT | Performed by: PATHOLOGY

## 2025-01-06 PROCEDURE — 88305 TISSUE EXAM BY PATHOLOGIST: CPT | Performed by: PATHOLOGY

## 2025-01-06 PROCEDURE — 63600175 PHARM REV CODE 636 W HCPCS

## 2025-01-06 PROCEDURE — 63600175 PHARM REV CODE 636 W HCPCS: Mod: TB | Performed by: UROLOGY

## 2025-01-06 PROCEDURE — 63600175 PHARM REV CODE 636 W HCPCS: Mod: TB | Performed by: STUDENT IN AN ORGANIZED HEALTH CARE EDUCATION/TRAINING PROGRAM

## 2025-01-06 PROCEDURE — 63600175 PHARM REV CODE 636 W HCPCS: Performed by: UROLOGY

## 2025-01-06 PROCEDURE — 27201423 OPTIME MED/SURG SUP & DEVICES STERILE SUPPLY: Performed by: UROLOGY

## 2025-01-06 PROCEDURE — 88344 IMHCHEM/IMCYTCHM EA MLT ANTB: CPT | Performed by: PATHOLOGY

## 2025-01-06 PROCEDURE — 52601 PROSTATECTOMY (TURP): CPT | Mod: ,,, | Performed by: UROLOGY

## 2025-01-06 RX ORDER — METOPROLOL SUCCINATE 50 MG/1
100 TABLET, EXTENDED RELEASE ORAL DAILY
Status: DISCONTINUED | OUTPATIENT
Start: 2025-01-07 | End: 2025-01-08 | Stop reason: HOSPADM

## 2025-01-06 RX ORDER — FENTANYL CITRATE 50 UG/ML
INJECTION, SOLUTION INTRAMUSCULAR; INTRAVENOUS
Status: DISCONTINUED | OUTPATIENT
Start: 2025-01-06 | End: 2025-01-06

## 2025-01-06 RX ORDER — PHENYLEPHRINE HYDROCHLORIDE 10 MG/ML
INJECTION INTRAVENOUS
Status: DISCONTINUED | OUTPATIENT
Start: 2025-01-06 | End: 2025-01-06

## 2025-01-06 RX ORDER — HYDROCODONE BITARTRATE AND ACETAMINOPHEN 5; 325 MG/1; MG/1
1 TABLET ORAL EVERY 4 HOURS PRN
Status: DISCONTINUED | OUTPATIENT
Start: 2025-01-06 | End: 2025-01-08 | Stop reason: HOSPADM

## 2025-01-06 RX ORDER — FUROSEMIDE 40 MG/1
40 TABLET ORAL DAILY
Status: DISCONTINUED | OUTPATIENT
Start: 2025-01-07 | End: 2025-01-08 | Stop reason: HOSPADM

## 2025-01-06 RX ORDER — ONDANSETRON HYDROCHLORIDE 2 MG/ML
INJECTION, SOLUTION INTRAVENOUS
Status: DISCONTINUED | OUTPATIENT
Start: 2025-01-06 | End: 2025-01-06

## 2025-01-06 RX ORDER — SUCCINYLCHOLINE CHLORIDE 20 MG/ML
INJECTION INTRAMUSCULAR; INTRAVENOUS
Status: DISCONTINUED | OUTPATIENT
Start: 2025-01-06 | End: 2025-01-06

## 2025-01-06 RX ORDER — SODIUM CHLORIDE, SODIUM LACTATE, POTASSIUM CHLORIDE, CALCIUM CHLORIDE 600; 310; 30; 20 MG/100ML; MG/100ML; MG/100ML; MG/100ML
INJECTION, SOLUTION INTRAVENOUS CONTINUOUS
Status: DISCONTINUED | OUTPATIENT
Start: 2025-01-06 | End: 2025-01-06

## 2025-01-06 RX ORDER — PROPOFOL 10 MG/ML
VIAL (ML) INTRAVENOUS
Status: DISCONTINUED | OUTPATIENT
Start: 2025-01-06 | End: 2025-01-06

## 2025-01-06 RX ORDER — LEVOFLOXACIN 500 MG/1
500 TABLET, FILM COATED ORAL DAILY
Status: DISCONTINUED | OUTPATIENT
Start: 2025-01-07 | End: 2025-01-06

## 2025-01-06 RX ORDER — IBUPROFEN 200 MG
24 TABLET ORAL
Status: DISCONTINUED | OUTPATIENT
Start: 2025-01-06 | End: 2025-01-08 | Stop reason: HOSPADM

## 2025-01-06 RX ORDER — GLUCAGON 1 MG
1 KIT INJECTION
Status: DISCONTINUED | OUTPATIENT
Start: 2025-01-06 | End: 2025-01-08 | Stop reason: HOSPADM

## 2025-01-06 RX ORDER — CEFAZOLIN 2 G/1
2 INJECTION, POWDER, FOR SOLUTION INTRAMUSCULAR; INTRAVENOUS
Status: COMPLETED | OUTPATIENT
Start: 2025-01-06 | End: 2025-01-06

## 2025-01-06 RX ORDER — ALLOPURINOL 300 MG/1
300 TABLET ORAL DAILY
Status: DISCONTINUED | OUTPATIENT
Start: 2025-01-07 | End: 2025-01-08 | Stop reason: HOSPADM

## 2025-01-06 RX ORDER — LEVOFLOXACIN 750 MG/1
750 TABLET ORAL
Status: DISCONTINUED | OUTPATIENT
Start: 2025-01-07 | End: 2025-01-07

## 2025-01-06 RX ORDER — TAMSULOSIN HYDROCHLORIDE 0.4 MG/1
1 CAPSULE ORAL DAILY
Status: DISCONTINUED | OUTPATIENT
Start: 2025-01-07 | End: 2025-01-08 | Stop reason: HOSPADM

## 2025-01-06 RX ORDER — ETOMIDATE 2 MG/ML
INJECTION INTRAVENOUS
Status: DISCONTINUED | OUTPATIENT
Start: 2025-01-06 | End: 2025-01-06

## 2025-01-06 RX ORDER — DEXAMETHASONE SODIUM PHOSPHATE 4 MG/ML
INJECTION, SOLUTION INTRA-ARTICULAR; INTRALESIONAL; INTRAMUSCULAR; INTRAVENOUS; SOFT TISSUE
Status: DISCONTINUED | OUTPATIENT
Start: 2025-01-06 | End: 2025-01-06

## 2025-01-06 RX ORDER — OXYCODONE AND ACETAMINOPHEN 5; 325 MG/1; MG/1
1 TABLET ORAL
Status: DISCONTINUED | OUTPATIENT
Start: 2025-01-06 | End: 2025-01-06 | Stop reason: HOSPADM

## 2025-01-06 RX ORDER — GLUCAGON 1 MG
1 KIT INJECTION
Status: DISCONTINUED | OUTPATIENT
Start: 2025-01-06 | End: 2025-01-06 | Stop reason: HOSPADM

## 2025-01-06 RX ORDER — ONDANSETRON HYDROCHLORIDE 2 MG/ML
4 INJECTION, SOLUTION INTRAVENOUS DAILY PRN
Status: DISCONTINUED | OUTPATIENT
Start: 2025-01-06 | End: 2025-01-06 | Stop reason: HOSPADM

## 2025-01-06 RX ORDER — LIDOCAINE HYDROCHLORIDE 20 MG/ML
INJECTION INTRAVENOUS
Status: DISCONTINUED | OUTPATIENT
Start: 2025-01-06 | End: 2025-01-06

## 2025-01-06 RX ORDER — ONDANSETRON 8 MG/1
8 TABLET, ORALLY DISINTEGRATING ORAL EVERY 8 HOURS PRN
Status: DISCONTINUED | OUTPATIENT
Start: 2025-01-06 | End: 2025-01-08 | Stop reason: HOSPADM

## 2025-01-06 RX ORDER — MIDAZOLAM HYDROCHLORIDE 1 MG/ML
INJECTION INTRAMUSCULAR; INTRAVENOUS
Status: DISCONTINUED | OUTPATIENT
Start: 2025-01-06 | End: 2025-01-06

## 2025-01-06 RX ORDER — HYOSCYAMINE SULFATE 0.12 MG/1
0.12 TABLET SUBLINGUAL EVERY 4 HOURS PRN
Status: DISCONTINUED | OUTPATIENT
Start: 2025-01-06 | End: 2025-01-08 | Stop reason: HOSPADM

## 2025-01-06 RX ORDER — IBUPROFEN 200 MG
16 TABLET ORAL
Status: DISCONTINUED | OUTPATIENT
Start: 2025-01-06 | End: 2025-01-08 | Stop reason: HOSPADM

## 2025-01-06 RX ORDER — HYDROMORPHONE HYDROCHLORIDE 1 MG/ML
1 INJECTION, SOLUTION INTRAMUSCULAR; INTRAVENOUS; SUBCUTANEOUS EVERY 4 HOURS PRN
Status: DISCONTINUED | OUTPATIENT
Start: 2025-01-06 | End: 2025-01-08 | Stop reason: HOSPADM

## 2025-01-06 RX ORDER — PHENAZOPYRIDINE HYDROCHLORIDE 100 MG/1
200 TABLET, FILM COATED ORAL 3 TIMES DAILY PRN
Status: DISCONTINUED | OUTPATIENT
Start: 2025-01-06 | End: 2025-01-08 | Stop reason: HOSPADM

## 2025-01-06 RX ORDER — INSULIN ASPART 100 [IU]/ML
0-10 INJECTION, SOLUTION INTRAVENOUS; SUBCUTANEOUS
Status: DISCONTINUED | OUTPATIENT
Start: 2025-01-06 | End: 2025-01-08 | Stop reason: HOSPADM

## 2025-01-06 RX ORDER — HYDROMORPHONE HYDROCHLORIDE 1 MG/ML
0.2 INJECTION, SOLUTION INTRAMUSCULAR; INTRAVENOUS; SUBCUTANEOUS EVERY 5 MIN PRN
Status: DISCONTINUED | OUTPATIENT
Start: 2025-01-06 | End: 2025-01-06 | Stop reason: HOSPADM

## 2025-01-06 RX ORDER — ROCURONIUM BROMIDE 10 MG/ML
INJECTION, SOLUTION INTRAVENOUS
Status: DISCONTINUED | OUTPATIENT
Start: 2025-01-06 | End: 2025-01-06

## 2025-01-06 RX ADMIN — DEXAMETHASONE SODIUM PHOSPHATE 4 MG: 4 INJECTION, SOLUTION INTRA-ARTICULAR; INTRALESIONAL; INTRAMUSCULAR; INTRAVENOUS; SOFT TISSUE at 03:01

## 2025-01-06 RX ADMIN — HYDROMORPHONE HYDROCHLORIDE 0.2 MG: 1 INJECTION, SOLUTION INTRAMUSCULAR; INTRAVENOUS; SUBCUTANEOUS at 04:01

## 2025-01-06 RX ADMIN — ROCURONIUM BROMIDE 5 MG: 10 SOLUTION INTRAVENOUS at 02:01

## 2025-01-06 RX ADMIN — PROPOFOL 30 MG: 10 INJECTION, EMULSION INTRAVENOUS at 02:01

## 2025-01-06 RX ADMIN — PHENYLEPHRINE HYDROCHLORIDE 100 MCG: 10 INJECTION INTRAVENOUS at 02:01

## 2025-01-06 RX ADMIN — HYDROMORPHONE HYDROCHLORIDE 0.2 MG: 1 INJECTION, SOLUTION INTRAMUSCULAR; INTRAVENOUS; SUBCUTANEOUS at 03:01

## 2025-01-06 RX ADMIN — ONDANSETRON 4 MG: 2 INJECTION INTRAMUSCULAR; INTRAVENOUS at 03:01

## 2025-01-06 RX ADMIN — SODIUM CHLORIDE, POTASSIUM CHLORIDE, SODIUM LACTATE AND CALCIUM CHLORIDE: 600; 310; 30; 20 INJECTION, SOLUTION INTRAVENOUS at 02:01

## 2025-01-06 RX ADMIN — PHENYLEPHRINE HYDROCHLORIDE 100 MCG: 10 INJECTION INTRAVENOUS at 03:01

## 2025-01-06 RX ADMIN — CEFAZOLIN 2 G: 2 INJECTION, POWDER, FOR SOLUTION INTRAMUSCULAR; INTRAVENOUS at 02:01

## 2025-01-06 RX ADMIN — SUCCINYLCHOLINE CHLORIDE 140 MG: 20 INJECTION, SOLUTION INTRAMUSCULAR; INTRAVENOUS; PARENTERAL at 02:01

## 2025-01-06 RX ADMIN — SUGAMMADEX 200 MG: 100 INJECTION, SOLUTION INTRAVENOUS at 03:01

## 2025-01-06 RX ADMIN — ROCURONIUM BROMIDE 20 MG: 10 SOLUTION INTRAVENOUS at 02:01

## 2025-01-06 RX ADMIN — HYDROMORPHONE HYDROCHLORIDE 1 MG: 1 INJECTION, SOLUTION INTRAMUSCULAR; INTRAVENOUS; SUBCUTANEOUS at 08:01

## 2025-01-06 RX ADMIN — LIDOCAINE HYDROCHLORIDE 100 MG: 20 INJECTION INTRAVENOUS at 02:01

## 2025-01-06 RX ADMIN — ETOMIDATE 12 MG: 2 INJECTION INTRAVENOUS at 02:01

## 2025-01-06 RX ADMIN — MIDAZOLAM HYDROCHLORIDE 2 MG: 1 INJECTION, SOLUTION INTRAMUSCULAR; INTRAVENOUS at 02:01

## 2025-01-06 RX ADMIN — FENTANYL CITRATE 50 MCG: 50 INJECTION, SOLUTION INTRAMUSCULAR; INTRAVENOUS at 02:01

## 2025-01-06 NOTE — ANESTHESIA PROCEDURE NOTES
Intubation    Date/Time: 1/6/2025 2:41 PM    Performed by: Ramya Chavez CRNA  Authorized by: Shola Chua MD    Intubation:     Induction:  Intravenous    Intubated:  Postinduction    Mask Ventilation:  Easy mask    Attempts:  1    Attempted By:  CRNA    Method of Intubation:  Video laryngoscopy    Blade:  Duke 3    Laryngeal View Grade: Grade IIA - cords partially seen      Difficult Airway Encountered?: No      Complications:  None    Airway Device:  Oral endotracheal tube    Airway Device Size:  7.5    Style/Cuff Inflation:  Cuffed    Tube secured:  23    Secured at:  The lips    Placement Verified By:  Capnometry    Complicating Factors:  Large/floppy epiglottis    Findings Post-Intubation:  BS equal bilateral and atraumatic/condition of teeth unchanged

## 2025-01-06 NOTE — OP NOTE
Date of Procedure: 01/06/2025    PREOP DIAGNOSIS:  Benign prostatic hypertrophy.    POSTOP DIAGNOSIS:  Benign prostatic hypertrophy.    PROCEDURES:  Transurethral resection of the prostate    SURGEON:  Ron Lassiter M.D.    Assistants: None    Specimen:  Prostate chips    ANESTHESIA:  General endotracheal.    BLOOD LOSS:  Minimal.    FINDINGS:  Clear channel at the conclusion.      PROCEDURE IN DETAIL:  Patient was brought to the operative suite and placed under general anesthesia and positioned into the dorsal lithotomy position.  After being sterilely prepped and draped a 24 Martiniquais resectoscope was inserted into a normal urethra.  Prostatic urethra demonstrated lateral lobe coaptation with a moderate median lobe.  Bladder neck was otherwise unremarkable.  Bilateral ureteral orifices were in normal size, shape, caliber and location.  The remainder of the bladder was otherwise unremarkable, no evidence of intravesical lesions or other abnormalities.  Median lobe was taken down to the level of the bladder.  Lateral lobes were then taken sufficiently back towards capsule circumferentially.  Care was taken around the sphincter, additional apical tissue was resected though.  We confirmed that capsule was identified circumferentially, making sure not to go proximal to the bladder neck or distal to the verumontanum.  Ureteral orifices were well away from resection plane.  The scope was removed and a coudé maneuver demonstrated a good flow.  A 24 Martiniquais 3 Rodrigues catheter was placed without difficulty, 16 mL sterile water was placed in the balloon and sat nicely upon the bladder neck.  Patient was taken to the PACU in stable condition and will be admitted to the floor postprocedure on continuous bladder irrigation.  Possible voiding trial in the morning.  I will have him return in 6 weeks with a uroflow and pvr.    COMPLICATIONS:  None

## 2025-01-06 NOTE — H&P
Chief Complaint:        Encounter Diagnosis   Name Primary?    Benign prostatic hyperplasia with urinary obstruction Yes         HPI:    12/12/24- here today for preop cystoscopy.  No need for recurrent batres.     64-year-old gentleman who comes in with the acute urinary retention requiring Batres catheterizations.  Apparently patient had an episode of this proximally 2 weeks ago, after 4 days the catheter was removed by the outside urologist and he was voiding well.  Prior to this no real complaints, only getting up 1 time per night.  No significant lower urinary tract symptoms on daily tamsulosin.  No dysuria, no gross hematuria, no microscopic hematuria, he does have a remote history of smoking.  Patient has no family history of urological cancers, his mother did have stones though.  Patient has had no previous urological history.  Unfortunately just a few days ago he again went into acute urinary retention requiring Batres catheter.  Postvoid residual was 921 mL.  Patient since that time has continued to have with a Batres catheter would like her to be removed, he is up to his tamsulosin to b.i.d. of note.  Patient does take Viagra 100 mg on occasion, no issues.  Patient is concerned that he did try a different alcoholic beverage and this could be related.     Allergies:  Adhesive and Suture, silk     Medications:  See MAR     Review of Systems:  General: No fever, chills, fatigability, or weight loss.  Skin: No rashes, itching, or changes in color or texture of skin.  Chest: Denies TAMEZ, cyanosis, wheezing, cough, and sputum production.  Abdomen: Appetite fine. No weight loss. Denies diarrhea, abdominal pain, hematemesis, or blood in stool.  Musculoskeletal: No joint stiffness or swelling. Denies back pain.  : As above.  All other review of systems negative.     PMH:   has a past medical history of Diabetes mellitus, type 2, Hyperlipidemia, and Hypertension.     PSH:   has a past surgical history that includes  Spinal fusion (N/A, 2019).     FamHx: family history is not on file.     SocHx:  reports that he has quit smoking. He has never used smokeless tobacco. He reports that he does not drink alcohol and does not use drugs.       Physical Exam:      Vitals:     12/12/24 1420   BP: 109/70   Pulse: 70      General: A&Ox3, no apparent distress, no deformities  Neck: No masses, normal ROM  Lungs: normal inspiration, no use of accessory muscles  Heart: normal pulse, no arrhythmias  Abdomen: Soft, NT, ND, no masses, no hernias, no hepatosplenomegaly  Skin: The skin is warm and dry. No jaundice.  Ext: No c/c/e.  : 12/24- Test desc sarah beth, no abnormalities of epididymus. Normal penile and scrotal skin. Meatus normal.     Labs/Studies:   Voiding trial 130 mL instilled, 200 mL voided, PVR 42 mL 12/4/24  Cystoscopy lateral lobe coaptation, small median lobe 12/24  PSA 2.9 7/24  PSA 2.6 7/23     Procedure: Diagnostic Cystoscopy     Procedure in Detail: After proper consents were obtained, the patient was prepped and draped in normal sterile fashion for diagnostic cystoscopy. 5 ml of lidocaine jelly was instilled in the urethra. The flexible cystoscope was then introduced into the urethra, and advanced into the bladder under direct vision. The urethral mucosa appeared normal, and no strictures were noted. The sphincter appeared to be normal, and the veru montanum was unremarkable. The prostatic mucosa demonstrates lateral lobe coaptation with a small median lobe. The bladder neck was normal. Inspection of the interior of the bladder was then carried out. The trigone was unremarkable, with no mucosal lesions. The ureteral orifices were normal in position and configuration. Systematic inspection of the mucosa of the bladder it was then carried out, rotating the cystoscope so that all areas of the left and right lateral walls, the dome of the bladder, and the posterior wall were all visualized. The cystoscope was then advanced further  into the bladder, and maximum deflection of the scope was performed so that the bladder neck could be inspected. No mucosal lesions were noted there. The cystoscope was then removed, and the procedure terminated.      Findings: lateral lobe coaptation, small median lobe           Impression/Plan:      1. BPH with obstruction- he has not required another batres and is on tamsulosin b.i.d..  He has decided to pursue surgery and based on the cystoscopic findings today will proceed with a traditional TURP, please see below in regards to our discussion.  Call with any issues in the meantime.      2. Erectile dysfunction- occasionally usage of Viagra 100 mg with no issue.     The patient understands the risks, benefits and alternatives.  These include but are not limited to damage to the surrounding structures including the urethra, prostate, ureteral orifices, bladder and rectum.  Risk of hematuria, regrowth, recurrent obstruction requiring further procedures or medical therapy, infection, further bleeding requiring other procedures, persistent burning.  Risk of perforation requiring an open procedure.  Risk of the possible need for stents or longer term catheterization.  Patient understands a biopsy will be taken and may diagnose him with prostate cancer during the procedure.  Understanding that retrograde ejaculation will occur following the procedure.  Risk of heart attack, stroke, death, DVT and PE.  Patient understanding of all of the above has elected to pursue the procedure.

## 2025-01-06 NOTE — ANESTHESIA PREPROCEDURE EVALUATION
01/06/2025  Hector Smith is a 64 y.o., male w/ severely reduced EF.    **ECHO 03/2020- EF: 20% Currently compensated. Continue home medications and follow clinically.     Patient Active Problem List   Diagnosis    Benign prostatic hyperplasia with urinary obstruction     Past Medical History:   Diagnosis Date    Diabetes mellitus, type 2     Hyperlipidemia     Hypertension      Past Surgical History:   Procedure Laterality Date    SPINAL FUSION N/A 2019       Chemistry        Component Value Date/Time     01/02/2025 1102    K 4.6 01/02/2025 1102     01/02/2025 1102    CO2 29 01/02/2025 1102    BUN 20 01/02/2025 1102    CREATININE 1.6 (H) 01/02/2025 1102     01/02/2025 1102        Component Value Date/Time    CALCIUM 9.9 01/02/2025 1102    ALKPHOS 76 01/02/2025 1102    AST 36 01/02/2025 1102    ALT 52 (H) 01/02/2025 1102    BILITOT 0.4 01/02/2025 1102    ESTGFRAFRICA 76 02/09/2024 0558        Lab Results   Component Value Date    WBC 4.54 01/02/2025    HGB 13.6 (L) 01/02/2025    HCT 44.2 01/02/2025    MCV 93 01/02/2025     01/02/2025             Pre-op Assessment    I have reviewed the Patient Summary Reports.    I have reviewed the NPO Status.   I have reviewed the Medications.     Review of Systems  Anesthesia Hx:  No problems with previous Anesthesia   History of prior surgery of interest to airway management or planning:  Previous anesthesia: General          Denies Personal Hx of Anesthesia complications.                    Social:  Former Smoker       Hematology/Oncology:  Hematology Normal   Oncology Normal                                   Cardiovascular:    Pacemaker Hypertension              ECG has been reviewed. Atrial-paced rhythm with prolonged AV conduction   Moderate voltage criteria for LVH, may be normal variant ( R in aVL ,   Efrain product )   Inferior  infarct ,age undetermined   Abnormal ECG   No previous ECGs available   Confirmed by Van Tracy ([id number]) on 1/2/2025 5:50:25 PM                                Pulmonary:  Pulmonary Normal                       Renal/:  Renal/ Normal                 Musculoskeletal:     S/p cervical fusion        Spine Disorders: cervical            Neurological:  Neurology Normal                                      Endocrine:  Diabetes, type 2   BMI 34      Obesity / BMI > 30      Physical Exam  General: Well nourished, Cooperative, Alert and Oriented    Airway:  Mallampati: III   Mouth Opening: Normal  TM Distance: Normal  Tongue: Large  Neck ROM: Extension Decreased, Extension Painful, Flexion Decreased, Left Lateral Motion Decreased, Right Lateral Motion Decreased    Dental:  Intact  Patient denies any currently loose or chipped teeth; Patient denies any removable dental appliances        Anesthesia Plan  Type of Anesthesia, risks & benefits discussed:    Anesthesia Type: Gen ETT, Gen Supraglottic Airway  Intra-op Monitoring Plan: Standard ASA Monitors  Post Op Pain Control Plan: multimodal analgesia and IV/PO Opioids PRN  Induction:  IV  Airway Plan: Video, Post-Induction  Informed Consent: Informed consent signed with the Patient and all parties understand the risks and agree with anesthesia plan.  All questions answered.   ASA Score: 4  Day of Surgery Review of History & Physical: H&P Update referred to the surgeon/provider.  Anesthesia Plan Notes: ETT 04/11/19; 0726 (created via procedure documentation); Video laryngoscopy; EMG; 7 mm; Yes; (glidescope 3); 3; Oral; Grade IIb; 1; Capnometry    Previous successful; GETA with Prop + Etomidate; Small doses of Levophed required intra-op     Ready For Surgery From Anesthesia Perspective.     .

## 2025-01-06 NOTE — TRANSFER OF CARE
"Anesthesia Transfer of Care Note    Patient: Hector Smith    Procedure(s) Performed: Procedure(s) (LRB):  TURP (TRANSURETHRAL RESECTION OF PROSTATE) (N/A)    Patient location: PACU    Anesthesia Type: general    Transport from OR: Transported from OR on room air with adequate spontaneous ventilation    Post pain: adequate analgesia    Post assessment: no apparent anesthetic complications and tolerated procedure well    Post vital signs: stable    Level of consciousness: responds to stimulation and awake    Nausea/Vomiting: no nausea/vomiting    Complications: none    Transfer of care protocol was followedComments: Report given to PACU RN at bedside. Hand off tool used. RN given opportunity to ask questions or clarify concerns. No Concerns verbalized. RN was asked if ready to assume care of patient. RN verbally confirmed. Pt. left in stable condition. SV. Vital Signs Return to Near Baseline. No s/s of distress noted.       Last vitals: Visit Vitals  /67 (BP Location: Right arm, Patient Position: Sitting)   Pulse 70   Temp 37 °C (98.6 °F) (Temporal)   Resp 18   Ht 5' 2" (1.575 m)   Wt 83.1 kg (183 lb 3.2 oz)   SpO2 97%   BMI 33.51 kg/m²     "

## 2025-01-06 NOTE — PLAN OF CARE
Problem: Adult Inpatient Plan of Care  Goal: Plan of Care Review  Outcome: Progressing  Goal: Patient-Specific Goal (Individualized)  Outcome: Progressing  Goal: Absence of Hospital-Acquired Illness or Injury  Outcome: Progressing  Goal: Optimal Comfort and Wellbeing  Outcome: Progressing  Goal: Readiness for Transition of Care  Outcome: Progressing     Problem: Infection  Goal: Absence of Infection Signs and Symptoms  Outcome: Progressing     Problem: Fall Injury Risk  Goal: Absence of Fall and Fall-Related Injury  Outcome: Progressing   Discussed poc with pt, pt verbalized understanding    Purposeful rounding every 2hours    VS wnl  Blood glucose monitoring   Fall precautions in place, remains injury free  Pt c/o pain to urethral meatus   Pain and nausea under control with PRN meds  IV Fluids to be started   Accurate I&Os-  batres catheter in place with CBI flowing at a moderate rate   Meds being given as prescribed  Pt on 2L nasal cannula   Bed locked at lowest position  Call light within reach    Chart check complete  Will cont with POC

## 2025-01-07 ENCOUNTER — TELEPHONE (OUTPATIENT)
Dept: UROLOGY | Facility: CLINIC | Age: 65
End: 2025-01-07
Payer: MEDICARE

## 2025-01-07 LAB
ANION GAP SERPL CALC-SCNC: 12 MMOL/L (ref 8–16)
BASOPHILS # BLD AUTO: 0.01 K/UL (ref 0–0.2)
BASOPHILS NFR BLD: 0.2 % (ref 0–1.9)
BUN SERPL-MCNC: 16 MG/DL (ref 8–23)
CALCIUM SERPL-MCNC: 9.8 MG/DL (ref 8.7–10.5)
CHLORIDE SERPL-SCNC: 104 MMOL/L (ref 95–110)
CO2 SERPL-SCNC: 21 MMOL/L (ref 23–29)
CREAT SERPL-MCNC: 1.2 MG/DL (ref 0.5–1.4)
DIFFERENTIAL METHOD BLD: ABNORMAL
EOSINOPHIL # BLD AUTO: 0 K/UL (ref 0–0.5)
EOSINOPHIL NFR BLD: 0 % (ref 0–8)
ERYTHROCYTE [DISTWIDTH] IN BLOOD BY AUTOMATED COUNT: 15.7 % (ref 11.5–14.5)
EST. GFR  (NO RACE VARIABLE): >60 ML/MIN/1.73 M^2
GLUCOSE SERPL-MCNC: 162 MG/DL (ref 70–110)
HCT VFR BLD AUTO: 42.4 % (ref 40–54)
HGB BLD-MCNC: 13.3 G/DL (ref 14–18)
IMM GRANULOCYTES # BLD AUTO: 0.03 K/UL (ref 0–0.04)
IMM GRANULOCYTES NFR BLD AUTO: 0.5 % (ref 0–0.5)
LYMPHOCYTES # BLD AUTO: 0.9 K/UL (ref 1–4.8)
LYMPHOCYTES NFR BLD: 15.7 % (ref 18–48)
MCH RBC QN AUTO: 28.7 PG (ref 27–31)
MCHC RBC AUTO-ENTMCNC: 31.4 G/DL (ref 32–36)
MCV RBC AUTO: 91 FL (ref 82–98)
MONOCYTES # BLD AUTO: 0.4 K/UL (ref 0.3–1)
MONOCYTES NFR BLD: 6 % (ref 4–15)
NEUTROPHILS # BLD AUTO: 4.7 K/UL (ref 1.8–7.7)
NEUTROPHILS NFR BLD: 77.6 % (ref 38–73)
NRBC BLD-RTO: 0 /100 WBC
PLATELET # BLD AUTO: 202 K/UL (ref 150–450)
PMV BLD AUTO: 9.6 FL (ref 9.2–12.9)
POCT GLUCOSE: 126 MG/DL (ref 70–110)
POCT GLUCOSE: 150 MG/DL (ref 70–110)
POCT GLUCOSE: 173 MG/DL (ref 70–110)
POCT GLUCOSE: 186 MG/DL (ref 70–110)
POTASSIUM SERPL-SCNC: 5.2 MMOL/L (ref 3.5–5.1)
RBC # BLD AUTO: 4.64 M/UL (ref 4.6–6.2)
SODIUM SERPL-SCNC: 137 MMOL/L (ref 136–145)
WBC # BLD AUTO: 6 K/UL (ref 3.9–12.7)

## 2025-01-07 PROCEDURE — 85025 COMPLETE CBC W/AUTO DIFF WBC: CPT | Performed by: UROLOGY

## 2025-01-07 PROCEDURE — 99900035 HC TECH TIME PER 15 MIN (STAT)

## 2025-01-07 PROCEDURE — 94799 UNLISTED PULMONARY SVC/PX: CPT

## 2025-01-07 PROCEDURE — 63600175 PHARM REV CODE 636 W HCPCS: Performed by: UROLOGY

## 2025-01-07 PROCEDURE — 80048 BASIC METABOLIC PNL TOTAL CA: CPT | Performed by: UROLOGY

## 2025-01-07 PROCEDURE — 51700 IRRIGATION OF BLADDER: CPT

## 2025-01-07 PROCEDURE — 25000003 PHARM REV CODE 250: Performed by: UROLOGY

## 2025-01-07 PROCEDURE — 36415 COLL VENOUS BLD VENIPUNCTURE: CPT | Performed by: UROLOGY

## 2025-01-07 RX ORDER — LEVOFLOXACIN 750 MG/1
750 TABLET ORAL DAILY
Status: DISCONTINUED | OUTPATIENT
Start: 2025-01-08 | End: 2025-01-08 | Stop reason: HOSPADM

## 2025-01-07 RX ORDER — LIDOCAINE HYDROCHLORIDE 20 MG/ML
JELLY TOPICAL
Status: DISCONTINUED | OUTPATIENT
Start: 2025-01-07 | End: 2025-01-08 | Stop reason: HOSPADM

## 2025-01-07 RX ORDER — CEFDINIR 300 MG/1
300 CAPSULE ORAL 2 TIMES DAILY
Qty: 20 CAPSULE | Refills: 0 | Status: SHIPPED | OUTPATIENT
Start: 2025-01-07 | End: 2025-01-17

## 2025-01-07 RX ORDER — OXYCODONE AND ACETAMINOPHEN 5; 325 MG/1; MG/1
1 TABLET ORAL EVERY 4 HOURS PRN
Qty: 10 TABLET | Refills: 0 | Status: SHIPPED | OUTPATIENT
Start: 2025-01-07

## 2025-01-07 RX ORDER — PHENAZOPYRIDINE HYDROCHLORIDE 200 MG/1
200 TABLET, FILM COATED ORAL 3 TIMES DAILY PRN
Qty: 15 TABLET | Refills: 0 | Status: SHIPPED | OUTPATIENT
Start: 2025-01-07

## 2025-01-07 RX ADMIN — ALLOPURINOL 300 MG: 300 TABLET ORAL at 08:01

## 2025-01-07 RX ADMIN — LIDOCAINE HYDROCHLORIDE: 20 JELLY TOPICAL at 01:01

## 2025-01-07 RX ADMIN — METOPROLOL SUCCINATE 100 MG: 50 TABLET, EXTENDED RELEASE ORAL at 08:01

## 2025-01-07 RX ADMIN — FUROSEMIDE 40 MG: 40 TABLET ORAL at 08:01

## 2025-01-07 RX ADMIN — TAMSULOSIN HYDROCHLORIDE 0.4 MG: 0.4 CAPSULE ORAL at 08:01

## 2025-01-07 RX ADMIN — HYDROCODONE BITARTRATE AND ACETAMINOPHEN 1 TABLET: 5; 325 TABLET ORAL at 09:01

## 2025-01-07 RX ADMIN — INSULIN ASPART 2 UNITS: 100 INJECTION, SOLUTION INTRAVENOUS; SUBCUTANEOUS at 05:01

## 2025-01-07 RX ADMIN — LEVOFLOXACIN 750 MG: 750 TABLET, FILM COATED ORAL at 08:01

## 2025-01-07 RX ADMIN — HYDROCODONE BITARTRATE AND ACETAMINOPHEN 1 TABLET: 5; 325 TABLET ORAL at 10:01

## 2025-01-07 NOTE — PLAN OF CARE
[facility] - Med Surg  Discharge Assessment    Primary Care Provider: Dayton Julien     Discharge Assessment (most recent)       BRIEF DISCHARGE ASSESSMENT - 01/07/25 1327          Discharge Planning    Assessment Type Discharge Planning Brief Assessment     Resource/Environmental Concerns none     Support Systems Spouse/significant other     Assistance Needed Independent     Equipment Currently Used at Home none     Current Living Arrangements home     Patient/Family Anticipates Transition to home     Patient/Family Anticipated Services at Transition none     DME Needed Upon Discharge  none     Discharge Plan A Home with family                   Patient has no d/c needs at this time. Sw to follow up, as needed, for d/c planning purposes.

## 2025-01-07 NOTE — TELEPHONE ENCOUNTER
----- Message from Ron Lassiter MD sent at 1/7/2025  7:59 AM CST -----  Early next week for nurse visit for a voiding trial.  See me in 6 weeks with a uroflow and pvr.

## 2025-01-07 NOTE — PROGRESS NOTES
Pharmacist Renal Dose Adjustment Note    Hector Smith is a 64 y.o. male being treated with the medication Levaquin    Patient Data:    Vital Signs (Most Recent):  Temp: 97.7 °F (36.5 °C) (01/07/25 0824)  Pulse: 70 (01/07/25 0824)  Resp: 16 (01/07/25 1050)  BP: 134/73 (01/07/25 0824)  SpO2: 96 % (01/07/25 0824) Vital Signs (72h Range):  Temp:  [97.7 °F (36.5 °C)-98.6 °F (37 °C)]   Pulse:  [69-76]   Resp:  [11-22]   BP: (103-177)/()   SpO2:  [93 %-100 %]      Recent Labs   Lab 01/02/25  1102 01/07/25  0552   CREATININE 1.6* 1.2     Serum creatinine: 1.2 mg/dL 01/07/25 0552  Estimated creatinine clearance: 56.7 mL/min    Medication:Levaquin 750 mg po q48hrs will be changed to medication:Levaquin 750 mg po q24hrs per renal dosing protocol. Pharmacist's Name: Mara Wang [facility]  Pharmacist's Extension: [phone]

## 2025-01-07 NOTE — PLAN OF CARE
Problem: Adult Inpatient Plan of Care  Goal: Plan of Care Review  Outcome: Progressing  Goal: Patient-Specific Goal (Individualized)  Outcome: Progressing  Goal: Absence of Hospital-Acquired Illness or Injury  Outcome: Progressing  Goal: Optimal Comfort and Wellbeing  Outcome: Progressing  Goal: Readiness for Transition of Care  Outcome: Progressing     Problem: Infection  Goal: Absence of Infection Signs and Symptoms  Outcome: Progressing     Problem: Fall Injury Risk  Goal: Absence of Fall and Fall-Related Injury  Outcome: Progressing   Discussed poc with pt, pt verbalized understanding    Purposeful rounding every 2hours    VS wnl  Blood glucose monitoring   Fall precautions in place, remains injury free  Pt c/o pain to meatus, lidocaine gel has been ordered   Pain  under control with PRN meds  Accurate I&Os- batres in place for CBI which is going at a slow rate with NS, pink tinged colored urine   Meds have been given as prescribed  Bed locked at lowest position  Call light within reach    Chart check complete  Will cont with POC

## 2025-01-07 NOTE — PROGRESS NOTES
Chief Complaint: BPH    HPI:   1/7/25- patient doing well with relatively clear urine.    Allergies:  Adhesive and Suture, silk    Medications:  See MAR    Review of Systems:  General: No fever, chills, fatigability, or weight loss.  Skin: No rashes, itching, or changes in color or texture of skin.  Chest: Denies TAMEZ, cyanosis, wheezing, cough, and sputum production.  Abdomen: Appetite fine. No weight loss. Denies diarrhea, abdominal pain, hematemesis, or blood in stool.  Musculoskeletal: No joint stiffness or swelling. Denies back pain.  : As above.  All other review of systems negative.    PMH:   has a past medical history of Diabetes mellitus, type 2, Hyperlipidemia, and Hypertension.    PSH:   has a past surgical history that includes Spinal fusion (N/A, 2019).    FamHx: family history is not on file.    SocHx:  reports that he has quit smoking. He has never used smokeless tobacco. He reports that he does not drink alcohol and does not use drugs.      Physical Exam:  Vitals:    01/07/25 0535   BP: 107/69   Pulse: 69   Resp: 18   Temp: 98.2 °F (36.8 °C)     General: A&Ox3, no apparent distress, no deformities  Neck: No masses, normal ROM  Lungs: normal inspiration, no use of accessory muscles  Heart: normal pulse, no arrhythmias  Abdomen: Soft, NT, ND, no masses, no hernias, no hepatosplenomegaly  Skin: The skin is warm and dry. No jaundice.  Ext: No c/c/e.  : 1/7/25- urine clear to light pink on slow drip.    Labs/Studies:   Stable, please see the chart.    Impression/Plan:     BPH-  s/p TURP  POD #1    Will reduce CBI and if remains clear then d/c home today with a batres.

## 2025-01-07 NOTE — NURSING
Rodrigues catheter in place- urine color is clear yellow.    Normal saline bag to be finished off  with no other to be hung.     Rodrigues just to hang with gravity and measured with normal output, CBI to be clamped off completely.

## 2025-01-07 NOTE — PROGRESS NOTES
Pharmacist Renal Dose Adjustment Note    Hector Smith is a 64 y.o. male being treated with the medication Levofloxacin.    Patient Data:    Vital Signs (Most Recent):  Temp: 98 °F (36.7 °C) (01/06/25 2017)  Pulse: 69 (01/06/25 2017)  Resp: 18 (01/06/25 2017)  BP: (!) 103/57 (01/06/25 2017)  SpO2: 97 % (01/06/25 2017) Vital Signs (72h Range):  Temp:  [97.8 °F (36.6 °C)-98.6 °F (37 °C)]   Pulse:  [69-76]   Resp:  [11-22]   BP: (103-177)/()   SpO2:  [93 %-100 %]      Recent Labs   Lab 01/02/25  1102   CREATININE 1.6*     Serum creatinine: 1.6 mg/dL (H) 01/02/25 1102  Estimated creatinine clearance: 42.4 mL/min (A)    Medication: Levofloxacin 500 mg daily will be changed to Levofloxacin 750 mg every 48 hours for CrCl 20 to 49 ml/min.     Thank you for allowing us to participate in this patient's care.     Raúl Davidson PharmD 01/06/2025 10:29 PM

## 2025-01-08 VITALS
TEMPERATURE: 98 F | DIASTOLIC BLOOD PRESSURE: 73 MMHG | HEIGHT: 62 IN | WEIGHT: 184.31 LBS | HEART RATE: 70 BPM | SYSTOLIC BLOOD PRESSURE: 121 MMHG | BODY MASS INDEX: 33.92 KG/M2 | OXYGEN SATURATION: 95 % | RESPIRATION RATE: 18 BRPM

## 2025-01-08 PROCEDURE — 99900035 HC TECH TIME PER 15 MIN (STAT)

## 2025-01-08 NOTE — ANESTHESIA POSTPROCEDURE EVALUATION
Anesthesia Post Evaluation    Patient: Hector Smith    Procedure(s) Performed: Procedure(s) (LRB):  TURP (TRANSURETHRAL RESECTION OF PROSTATE) (N/A)    Final Anesthesia Type: general      Patient location during evaluation: PACU  Patient participation: Yes- Able to Participate  Level of consciousness: awake and alert and oriented  Post-procedure vital signs: reviewed and stable  Pain management: adequate  Airway patency: patent  PERNELL mitigation strategies: Verification of full reversal of neuromuscular block  PONV status at discharge: No PONV  Anesthetic complications: no      Cardiovascular status: blood pressure returned to baseline and hemodynamically stable  Respiratory status: unassisted  Hydration status: euvolemic  Follow-up not needed.              Vitals Value Taken Time   /73 01/06/25 1715   Temp 36.8 °C (98.3 °F) 01/06/25 1715   Pulse 70 01/06/25 1715   Resp 18 01/06/25 1715   SpO2 95 % 01/06/25 1715         Event Time   Out of Recovery 17:20:00         Pain/Liudmila Score: Pain Rating Prior to Med Admin: 5 (1/7/2025  9:51 PM)  Pain Rating Post Med Admin: 2 (1/7/2025 11:50 AM)

## 2025-01-08 NOTE — PLAN OF CARE
Patient discharged Home with family. AVS given and explained to patient. Patient verbalizes understanding. Patient will go home with batres in place and intact.IV removed with catheter intact. Prescriptions delivered to bedside on 1/07/25. Follow up appointments arranged.

## 2025-01-08 NOTE — PROGRESS NOTES
Chief Complaint: BPH    HPI:   1/8/25- patient clear to pink on no drip.  1/7/25- patient doing well with relatively clear urine.    Allergies:  Adhesive and Suture, silk    Medications:  See MAR    Review of Systems:  General: No fever, chills, fatigability, or weight loss.  Skin: No rashes, itching, or changes in color or texture of skin.  Chest: Denies TAMEZ, cyanosis, wheezing, cough, and sputum production.  Abdomen: Appetite fine. No weight loss. Denies diarrhea, abdominal pain, hematemesis, or blood in stool.  Musculoskeletal: No joint stiffness or swelling. Denies back pain.  : As above.  All other review of systems negative.    PMH:   has a past medical history of Diabetes mellitus, type 2, Hyperlipidemia, and Hypertension.    PSH:   has a past surgical history that includes Spinal fusion (N/A, 2019) and Transurethral resection of prostate (N/A, 1/6/2025).    FamHx: family history is not on file.    SocHx:  reports that he has quit smoking. He has never used smokeless tobacco. He reports that he does not drink alcohol and does not use drugs.      Physical Exam:  Vitals:    01/08/25 0403   BP: 131/73   Pulse: 70   Resp: 17   Temp: 98.1 °F (36.7 °C)     General: A&Ox3, no apparent distress, no deformities  Neck: No masses, normal ROM  Lungs: normal inspiration, no use of accessory muscles  Heart: normal pulse, no arrhythmias  Abdomen: Soft, NT, ND, no masses, no hernias, no hepatosplenomegaly  Skin: The skin is warm and dry. No jaundice.  Ext: No c/c/e.  : 1/8/25- urine clear to light pink on no drip.    Labs/Studies:   None     Impression/Plan:     BPH-  s/p TURP  POD #2    -clear for d/c home with batres in place.  -will arrange for voiding trial as an outpatient.

## 2025-01-08 NOTE — DISCHARGE SUMMARY
[facility] S[facility]  Discharge Note  Short Stay    Procedure(s) (LRB):  TURP (TRANSURETHRAL RESECTION OF PROSTATE) (N/A)      OUTCOME: Patient tolerated treatment/procedure well without complication and is now ready for discharge.    DISPOSITION: Home or Self Care    FINAL DIAGNOSIS:  BPH    FOLLOWUP: In clinic    DISCHARGE INSTRUCTIONS:    Discharge Procedure Orders   Diet Adult Regular     Notify your health care provider if you experience any of the following:  severe uncontrolled pain     Notify your health care provider if you experience any of the following:  persistent nausea and vomiting or diarrhea     Notify your health care provider if you experience any of the following:  temperature >100.4     Activity as tolerated        TIME SPENT ON DISCHARGE: 5 minutes

## 2025-01-08 NOTE — PLAN OF CARE
[facility] - Med Surg  Discharge Final Note    Primary Care Provider: Dayton Julien    Expected Discharge Date: 1/8/2025    Final Discharge Note (most recent)       Final Note - 01/08/25 0853          Final Note    Assessment Type Final Discharge Note     Anticipated Discharge Disposition Home or Self Care     Hospital Resources/Appts/Education Provided Appointments scheduled and added to AVS        Post-Acute Status    Discharge Delays None known at this time                   Patient has no d/c needs at this time. Sw to follow up, as needed, for d/c planning purposes.

## 2025-01-09 LAB
COMMENT: NORMAL
FINAL PATHOLOGIC DIAGNOSIS: NORMAL
GROSS: NORMAL
Lab: NORMAL

## 2025-01-10 ENCOUNTER — TELEPHONE (OUTPATIENT)
Dept: UROLOGY | Facility: CLINIC | Age: 65
End: 2025-01-10
Payer: MEDICARE

## 2025-01-10 DIAGNOSIS — C61 PROSTATE CANCER: Primary | ICD-10-CM

## 2025-01-10 NOTE — TELEPHONE ENCOUNTER
----- Message from Ron Lassiter MD sent at 1/10/2025 10:57 AM CST -----  Need a PET-CT scan, order is in the chart, need it relatively soon.

## 2025-01-13 ENCOUNTER — CLINICAL SUPPORT (OUTPATIENT)
Dept: UROLOGY | Facility: CLINIC | Age: 65
End: 2025-01-13
Payer: MEDICARE

## 2025-01-13 ENCOUNTER — PATIENT MESSAGE (OUTPATIENT)
Dept: UROLOGY | Facility: CLINIC | Age: 65
End: 2025-01-13

## 2025-01-13 DIAGNOSIS — N40.1 BENIGN PROSTATIC HYPERPLASIA WITH URINARY OBSTRUCTION: Primary | ICD-10-CM

## 2025-01-13 DIAGNOSIS — N13.8 BENIGN PROSTATIC HYPERPLASIA WITH URINARY OBSTRUCTION: Primary | ICD-10-CM

## 2025-01-13 PROCEDURE — 99999 PR PBB SHADOW E&M-EST. PATIENT-LVL II: CPT | Mod: PBBFAC,,,

## 2025-01-13 NOTE — PROGRESS NOTES
Patient came for a PVR after a Voiding Trial this morning, result was 34 mL. Dr Lassiter notified.     Loraine Milanes RN

## 2025-01-23 DIAGNOSIS — N40.1 BENIGN PROSTATIC HYPERPLASIA WITH URINARY OBSTRUCTION: ICD-10-CM

## 2025-01-23 DIAGNOSIS — N13.8 BENIGN PROSTATIC HYPERPLASIA WITH URINARY OBSTRUCTION: ICD-10-CM

## 2025-01-23 RX ORDER — PHENAZOPYRIDINE HYDROCHLORIDE 200 MG/1
200 TABLET, FILM COATED ORAL 3 TIMES DAILY PRN
Qty: 15 TABLET | Refills: 0 | Status: SHIPPED | OUTPATIENT
Start: 2025-01-23 | End: 2025-01-30 | Stop reason: SDUPTHER

## 2025-01-23 RX ORDER — OXYCODONE AND ACETAMINOPHEN 5; 325 MG/1; MG/1
1 TABLET ORAL EVERY 4 HOURS PRN
Qty: 10 TABLET | Refills: 0 | Status: SHIPPED | OUTPATIENT
Start: 2025-01-23

## 2025-01-24 ENCOUNTER — PATIENT MESSAGE (OUTPATIENT)
Dept: URGENT CARE | Facility: CLINIC | Age: 65
End: 2025-01-24
Payer: MEDICARE

## 2025-01-29 ENCOUNTER — HOSPITAL ENCOUNTER (OUTPATIENT)
Dept: RADIOLOGY | Facility: HOSPITAL | Age: 65
Discharge: HOME | End: 2025-01-29
Attending: UROLOGY
Payer: MEDICARE

## 2025-01-29 DIAGNOSIS — C61 PROSTATE CANCER: ICD-10-CM

## 2025-01-29 PROCEDURE — 78815 PET IMAGE W/CT SKULL-THIGH: CPT | Mod: TC

## 2025-01-29 PROCEDURE — 78815 PET IMAGE W/CT SKULL-THIGH: CPT | Mod: 26,PI,, | Performed by: STUDENT IN AN ORGANIZED HEALTH CARE EDUCATION/TRAINING PROGRAM

## 2025-01-29 PROCEDURE — A9595 HC PIFLUFOLASTAT F-18, DX, PER 1 MCI: HCPCS | Mod: TB | Performed by: UROLOGY

## 2025-01-29 RX ADMIN — PIFLUFOLASTAT F-18 9.4 MILLICURIE: 80 INJECTION INTRAVENOUS at 11:01

## 2025-01-30 ENCOUNTER — TELEPHONE (OUTPATIENT)
Dept: UROLOGY | Facility: CLINIC | Age: 65
End: 2025-01-30
Payer: MEDICARE

## 2025-01-30 RX ORDER — PHENAZOPYRIDINE HYDROCHLORIDE 200 MG/1
200 TABLET, FILM COATED ORAL 3 TIMES DAILY PRN
Qty: 15 TABLET | Refills: 1 | Status: SHIPPED | OUTPATIENT
Start: 2025-01-30

## 2025-01-30 NOTE — TELEPHONE ENCOUNTER
----- Message from ELIEZER Sepulveda sent at 1/30/2025  7:39 AM CST -----  Can you help me with this? I can't find anything before 02/24.   Thanks!  ----- Message -----  From: Ron Lassiter MD  Sent: 1/29/2025   9:52 PM CST  To: Loraine Milanes Flores, RN    Needs to come in sooner to see me

## 2025-01-30 NOTE — TELEPHONE ENCOUNTER
----- Message from Ron Lassiter MD sent at 1/29/2025  9:52 PM CST -----  Needs to come in sooner to see me

## 2025-02-07 ENCOUNTER — LAB VISIT (OUTPATIENT)
Dept: LAB | Facility: HOSPITAL | Age: 65
End: 2025-02-07
Attending: UROLOGY
Payer: MEDICARE

## 2025-02-07 ENCOUNTER — OFFICE VISIT (OUTPATIENT)
Dept: UROLOGY | Facility: CLINIC | Age: 65
End: 2025-02-07
Payer: MEDICARE

## 2025-02-07 VITALS
HEART RATE: 69 BPM | BODY MASS INDEX: 32.39 KG/M2 | SYSTOLIC BLOOD PRESSURE: 112 MMHG | WEIGHT: 176 LBS | TEMPERATURE: 99 F | DIASTOLIC BLOOD PRESSURE: 72 MMHG | RESPIRATION RATE: 14 BRPM | HEIGHT: 62 IN

## 2025-02-07 DIAGNOSIS — C61 PROSTATE CANCER: ICD-10-CM

## 2025-02-07 DIAGNOSIS — N40.1 BENIGN PROSTATIC HYPERPLASIA WITH URINARY OBSTRUCTION: Primary | ICD-10-CM

## 2025-02-07 DIAGNOSIS — N13.8 BENIGN PROSTATIC HYPERPLASIA WITH URINARY OBSTRUCTION: Primary | ICD-10-CM

## 2025-02-07 LAB — COMPLEXED PSA SERPL-MCNC: 6.4 NG/ML (ref 0–4)

## 2025-02-07 PROCEDURE — 1159F MED LIST DOCD IN RCRD: CPT | Mod: CPTII,S$GLB,, | Performed by: UROLOGY

## 2025-02-07 PROCEDURE — 36415 COLL VENOUS BLD VENIPUNCTURE: CPT | Performed by: UROLOGY

## 2025-02-07 PROCEDURE — 84153 ASSAY OF PSA TOTAL: CPT | Performed by: UROLOGY

## 2025-02-07 PROCEDURE — 99999 PR PBB SHADOW E&M-EST. PATIENT-LVL V: CPT | Mod: PBBFAC,,, | Performed by: UROLOGY

## 2025-02-07 PROCEDURE — 1160F RVW MEDS BY RX/DR IN RCRD: CPT | Mod: CPTII,S$GLB,, | Performed by: UROLOGY

## 2025-02-07 PROCEDURE — 3078F DIAST BP <80 MM HG: CPT | Mod: CPTII,S$GLB,, | Performed by: UROLOGY

## 2025-02-07 PROCEDURE — 1126F AMNT PAIN NOTED NONE PRSNT: CPT | Mod: CPTII,S$GLB,, | Performed by: UROLOGY

## 2025-02-07 PROCEDURE — 3074F SYST BP LT 130 MM HG: CPT | Mod: CPTII,S$GLB,, | Performed by: UROLOGY

## 2025-02-07 PROCEDURE — 99024 POSTOP FOLLOW-UP VISIT: CPT | Mod: S$GLB,,, | Performed by: UROLOGY

## 2025-02-07 RX ORDER — SILDENAFIL 100 MG/1
100 TABLET, FILM COATED ORAL DAILY PRN
Qty: 15 TABLET | Refills: 11 | Status: SHIPPED | OUTPATIENT
Start: 2025-02-07

## 2025-02-07 NOTE — PROGRESS NOTES
Chief Complaint:   Encounter Diagnoses   Name Primary?    Benign prostatic hyperplasia with urinary obstruction Yes    Prostate cancer        HPI:    2/7/25- currently voiding well, here today to discuss his pathology.    64-year-old gentleman who comes in with the acute urinary retention requiring Rodrigues catheterizations.  Apparently patient had an episode of this proximally 2 weeks ago, after 4 days the catheter was removed by the outside urologist and he was voiding well.  Prior to this no real complaints, only getting up 1 time per night.  No significant lower urinary tract symptoms on daily tamsulosin.  No dysuria, no gross hematuria, no microscopic hematuria, he does have a remote history of smoking.  Patient has no family history of urological cancers, his mother did have stones though.  Patient has had no previous urological history.  Unfortunately just a few days ago he again went into acute urinary retention requiring Rodrigues catheter.  Postvoid residual was 921 mL.  Patient since that time has continued to have with a Rodrigues catheter would like her to be removed, he is up to his tamsulosin to b.i.d. of note.  Patient does take Viagra 100 mg on occasion, no issues.  Patient is concerned that he did try a different alcoholic beverage and this could be related.    Allergies:  Adhesive and Suture, silk    Medications:  See MAR    Review of Systems:  General: No fever, chills, fatigability, or weight loss.  Skin: No rashes, itching, or changes in color or texture of skin.  Chest: Denies TAMEZ, cyanosis, wheezing, cough, and sputum production.  Abdomen: Appetite fine. No weight loss. Denies diarrhea, abdominal pain, hematemesis, or blood in stool.  Musculoskeletal: No joint stiffness or swelling. Denies back pain.  : As above.  All other review of systems negative.    PMH:   has a past medical history of Diabetes mellitus, type 2, Hyperlipidemia, and Hypertension.    PSH:   has a past surgical history that includes  Spinal fusion (N/A, 2019).    FamHx: family history is not on file.    SocHx:  reports that he has quit smoking. He has never used smokeless tobacco. He reports that he does not drink alcohol and does not use drugs.      Physical Exam:  Vitals:    12/12/24 1420   BP: 109/70   Pulse: 70     General: A&Ox3, no apparent distress, no deformities  Neck: No masses, normal ROM  Lungs: normal inspiration, no use of accessory muscles  Heart: normal pulse, no arrhythmias  Abdomen: Soft, NT, ND, no masses, no hernias, no hepatosplenomegaly  Skin: The skin is warm and dry. No jaundice.  Ext: No c/c/e.  : 12/24- Test desc sarah beth, no abnormalities of epididymus. Normal penile and scrotal skin. Meatus normal.    Labs/Studies:   PVR 0 ml 2/25  TURP Gl9 1/25  Voiding trial 130 mL instilled, 200 mL voided, PVR 42 mL 12/4/24  Cystoscopy lateral lobe coaptation, small median lobe 12/24  PSA 2.9 7/24  PSA 2.6 7/23  PET-CT known site within the prostate, multiple pelvic and retroperitoneal avid lymph nodes, possible involvement right seminal vesicle 1/25    Impression/Plan:      1. BPH with obstruction-  TURP  1/6/25    Patient is voiding much better, he may now stop the tamsulosin.      2. Prostate cancer- new diagnosis of high-risk prostatic adenocarcinoma, PET-CT is suspicious for davi and seminal vesicle involvement.  Please see below in regards to our discussion today, due to these findings we will go ahead and refer to Radiation Oncology and Hematology Oncology, he will initiate Lupron now.  I will see him back following these consultations.    3. Erectile dysfunction- Viagra 100 mg works well.    I had a long discussion patient that in regards to risk stratification, treatment options.  These options include but not limited to external beam radiation therapy, brachytherapy, robotically assisted laparoscopic radical prostatectomy, hormonal therapy or cryotherapy.  Discussed the risks and benefits of all the above including surgery.   These include but not limited to damage to surrounding structures including the vascular structures, bladder, ureters and bowel.  We understanding 2nd bowel prep prior to the surgery and he will have Rodrigues catheter for at least a week after the surgery.  The risk of heart attack, stroke, death, DVT and PE from surgical management.  Risk of incontinence, erectile dysfunction, need for further surgeries in regards to all these.  Risk of recurrence even after surgical management which might require hormonal therapy or radiation therapy.  I have given the patient adequate amounts of times to answer all of his questions, I have asked him to contact us if he has further questions in the near future.     Hemigard Postcare Instructions: The HEMIGARD strips are to remain completely dry for at least 5-7 days.

## 2025-02-13 ENCOUNTER — CLINICAL SUPPORT (OUTPATIENT)
Dept: UROLOGY | Facility: CLINIC | Age: 65
End: 2025-02-13
Payer: MEDICARE

## 2025-02-13 DIAGNOSIS — C61 PROSTATE CANCER: Primary | ICD-10-CM

## 2025-02-13 PROCEDURE — 99999 PR PBB SHADOW E&M-EST. PATIENT-LVL II: CPT | Mod: PBBFAC,,,

## 2025-02-13 PROCEDURE — 96402 CHEMO HORMON ANTINEOPL SQ/IM: CPT | Mod: S$GLB,,, | Performed by: UROLOGY

## 2025-02-13 NOTE — PROGRESS NOTES
..Lupron 45 mg administered IM to right  ventrogluteal using aseptic technique. Pt tolerated procedure well. Patient agreed to wait 15 minutes after injection in the clinic and to report any adverse reactions.

## 2025-02-18 ENCOUNTER — INITIAL CONSULT (OUTPATIENT)
Dept: RADIATION ONCOLOGY | Facility: CLINIC | Age: 65
End: 2025-02-18
Payer: MEDICARE

## 2025-02-18 VITALS
WEIGHT: 184.5 LBS | TEMPERATURE: 98 F | BODY MASS INDEX: 33.95 KG/M2 | SYSTOLIC BLOOD PRESSURE: 112 MMHG | HEART RATE: 68 BPM | OXYGEN SATURATION: 97 % | DIASTOLIC BLOOD PRESSURE: 69 MMHG | RESPIRATION RATE: 18 BRPM | HEIGHT: 62 IN

## 2025-02-18 DIAGNOSIS — C61 PROSTATE CANCER: ICD-10-CM

## 2025-02-18 RX ORDER — ZOLPIDEM TARTRATE 5 MG/1
5 TABLET ORAL
COMMUNITY
Start: 2024-09-26

## 2025-02-18 RX ORDER — POTASSIUM CHLORIDE 20 MEQ/1
TABLET, EXTENDED RELEASE ORAL
COMMUNITY
Start: 2025-02-05

## 2025-02-18 RX ORDER — TAMSULOSIN HYDROCHLORIDE 0.4 MG/1
0.4 CAPSULE ORAL
COMMUNITY

## 2025-02-18 RX ORDER — GABAPENTIN 300 MG/1
CAPSULE ORAL
COMMUNITY
Start: 2024-12-11

## 2025-02-18 RX ORDER — DAPAGLIFLOZIN 10 MG/1
10 TABLET, FILM COATED ORAL
COMMUNITY

## 2025-02-18 RX ORDER — BUSPIRONE HYDROCHLORIDE 5 MG/1
5 TABLET ORAL 2 TIMES DAILY
COMMUNITY

## 2025-02-18 NOTE — PROGRESS NOTES
[facility] - Radiation Oncology Consult Note    HISTORY OF PRESENT ILLNESS:  64-year-old man whose PSA yuri to 2.9 on 07/11/2024, up from 2.61 year prior    Contrasted CT of the abdomen and pelvis on 09/12/2024 during a workup for PE showed diffusely enlarged nodular heterogeneous prostate with nonspecific prominent lymph nodes in the left common iliac and external iliac region, with mild bladder wall thickening and minimal adjacent stranding, nonspecific    He experienced acute retention requiring Rodrigues placement with 921 cc PVR.    He underwent TURP on 01/06/2025 for outlet obstructive symptoms with submitted tissue recovering 4+5 disease involving 11-20% of the entirely submitted specimen.  Perineural invasion is appreciated.  Lymphovascular invasion is appreciated    PSMA PET on 01/29/2025 showed ill-defined masslike nodularity in the left inferior prostate with suspected involvement of the right seminal vesicle and multiple pelvic and retroperitoneal avid lymph nodes.  Air in the bladder, correlate with recent instrumentation versus possible rectovesicular fistula        Lupron was 1st delivered on 02/13/2025, a six-month injection      REVIEW OF SYSTEMS:  He took Flomax prior to his TURP in his no longer taking any urologic medication, describing overall resulting improved function with an AUA score of 19.  For me today he reports improved but still suboptimal stream that tapers at the end of urination, with daytime frequency every 1-1.5 hours, nocturia once per evening, and small volume urinary continence when he passes gas not requiring use of a pad or change of shorts.  He denies hesitancy, hematuria, dysuria, daytime urgency, new bone pain, or new GI complaints.  He has chronic constipation not requiring medical intervention.  He reports good erectile function with an IIEF score of 17, benefitted by 100 mg Viagra, and is sexually active.    He works as a   without physical limitation in otherwise has a negative multisystem review    PAST MEDICAL HISTORY:  Past Medical History:   Diagnosis Date    Diabetes mellitus, type 2     Hyperlipidemia     Hypertension        PAST SURGICAL HISTORY:  Past Surgical History:   Procedure Laterality Date    SPINAL FUSION N/A 2019    TRANSURETHRAL RESECTION OF PROSTATE N/A 1/6/2025    Procedure: TURP (TRANSURETHRAL RESECTION OF PROSTATE);  Surgeon: Ron Lassiter MD;  Location: [facility];  Service: Urology;  Laterality: N/A;       ALLERGIES:   Review of patient's allergies indicates:   Allergen Reactions    Adhesive Rash     Silk Tape    Suture, silk Rash       MEDICATIONS:  Current Outpatient Medications   Medication Sig    atorvastatin (LIPITOR) 20 MG tablet Take 20 mg by mouth once daily.    busPIRone (BUSPAR) 5 MG Tab Take 5 mg by mouth 2 (two) times daily.    clopidogreL (PLAVIX) 75 mg tablet Take 75 mg by mouth once daily.    colchicine (COLCRYS) 0.6 mg tablet Take 0.6 mg by mouth daily as needed.    dapagliflozin propanediol (FARXIGA) 10 mg tablet Take 10 mg by mouth.    ENTRESTO 24-26 mg per tablet Take 1 tablet by mouth 2 (two) times daily.    furosemide (LASIX) 40 MG tablet Take 40 mg by mouth once daily.    gabapentin (NEURONTIN) 300 MG capsule     indomethacin (INDOCIN) 50 MG capsule Take 1 capsule (50 mg total) by mouth 3 (three) times daily as needed (Pain/Gout Attack).    JANUMET  mg per tablet Take 1 tablet by mouth 2 (two) times daily.    metoprolol succinate (TOPROL-XL) 100 MG 24 hr tablet Take 100 mg by mouth once daily.    oxyCODONE-acetaminophen (PERCOCET) 5-325 mg per tablet Take 1 tablet by mouth every 4 (four) hours as needed for Pain.    potassium chloride SA (K-DUR,KLOR-CON) 20 MEQ tablet TAKE 1  BY MOUTH ONCE DAILY WITH FOOD    sildenafiL (VIAGRA) 100 MG tablet Take 1 tablet (100 mg total) by mouth daily as needed for Erectile Dysfunction.    tamsulosin (FLOMAX) 0.4 mg Cap Take 0.4 mg by mouth.     "zolpidem (AMBIEN) 5 MG Tab Take 5 mg by mouth.    allopurinoL (ZYLOPRIM) 300 MG tablet Take 1 tablet by mouth once daily (Patient not taking: Reported on 2/18/2025)    aspirin (ECOTRIN) 81 MG EC tablet 1 tablet (Patient not taking: Reported on 2/18/2025)    ketoconazole (NIZORAL) 2 % cream Apply topically 2 (two) times daily.     No current facility-administered medications for this visit.       SOCIAL HISTORY:  Social History[1]    FAMILY HISTORY:  No family history on file.      PHYSICAL EXAMINATION:       Vitals:    02/18/25 0837   BP: 112/69   Pulse: 68   Resp: 18   Temp: 98.3 °F (36.8 °C)   SpO2: 97%   Weight: 83.7 kg (184 lb 8.4 oz)   Height: 5' 2" (1.575 m)        General:  A&O x4, NAD   Head:  PERRLA, EOM intact, CN II-XII intact  Lymphatics:  No cervical or supraclavicular adenopathy   Lungs: Clear to auscultation bilaterally   Heart regular:  rate and rhythm  Abdomen:  nontender and nondistended with positive bowel sounds no hepatomegaly  Pelvis:  No inguinal adenopathy, normal phallus scrotal contents, good rectal tone with a large irregular hard/firm prostate with a more nodular component at the right base with no cayden OLMAN    KPS:  90    ASSESSMENT:  64-year-old man with a high-risk T3B N1 M0 prostate cancer with a PSA of only 2.9 discovered at therapeutic TURP recovering 4+5 disease.  PET scan shows extensive involvement of the prostate with invasion of the right seminal vesicle and multiple bilateral pelvic lymph nodes up to the top of L5    Lupron was 1st delivered on 02/13/2025    He has good urinary function on no medication and intact erectile function benefitting from Viagra    He has good disease insight and good social support    PLAN:  We had a lengthy and cayden discussion regarding the gravity of his diagnosis and I have recommended combined endocrine therapy with local regional radiotherapy with curative intent    He has been referred to Dr. Espinoza in Medical Oncology and I have endorsed " the use of additional endocrine therapy    He will return to Dr. Lassiter clinic in the interval for placement of fiducial seeds    He will return to my clinic in 2.5-3 months for informed written consent and CT simulation      I spent approximately 60 minutes reviewing the available records and evaluating the patient, out of which over 50% of the time was spent face to face with the patient in counseling and coordinating this patient's care.             [1]   Social History  Socioeconomic History    Marital status:    Tobacco Use    Smoking status: Former     Current packs/day: 0.00     Types: Cigarettes     Quit date:      Years since quittin.1    Smokeless tobacco: Never   Substance and Sexual Activity    Alcohol use: Never    Drug use: Never    Sexual activity: Not Currently     Social Drivers of Health     Financial Resource Strain: Medium Risk (2025)    Overall Financial Resource Strain (CARDIA)     Difficulty of Paying Living Expenses: Somewhat hard   Food Insecurity: No Food Insecurity (2025)    Hunger Vital Sign     Worried About Running Out of Food in the Last Year: Never true     Ran Out of Food in the Last Year: Never true   Transportation Needs: No Transportation Needs (2025)    PRAPARE - Transportation     Lack of Transportation (Medical): No     Lack of Transportation (Non-Medical): No   Physical Activity: Insufficiently Active (2025)    Exercise Vital Sign     Days of Exercise per Week: 2 days     Minutes of Exercise per Session: 30 min   Stress: Stress Concern Present (2025)    [employer] - Occupational Stress Questionnaire     Feeling of Stress : To some extent   Housing Stability: Low Risk  (2025)    Housing Stability Vital Sign     Unable to Pay for Housing in the Last Year: No     Homeless in the Last Year: No

## 2025-02-19 ENCOUNTER — TELEPHONE (OUTPATIENT)
Dept: UROLOGY | Facility: CLINIC | Age: 65
End: 2025-02-19
Payer: MEDICARE

## 2025-02-19 NOTE — TELEPHONE ENCOUNTER
Called pt and scheduled his fiducial markers; instructed pt to do a fleets enema at 6pm the evening before; pt verbalized understanding.

## 2025-02-24 ENCOUNTER — DOCUMENTATION ONLY (OUTPATIENT)
Dept: HEMATOLOGY/ONCOLOGY | Facility: CLINIC | Age: 65
End: 2025-02-24
Payer: MEDICARE

## 2025-02-24 ENCOUNTER — TELEPHONE (OUTPATIENT)
Dept: HEMATOLOGY/ONCOLOGY | Facility: CLINIC | Age: 65
End: 2025-02-24
Payer: MEDICARE

## 2025-02-24 ENCOUNTER — CLINICAL SUPPORT (OUTPATIENT)
Dept: UROLOGY | Facility: CLINIC | Age: 65
End: 2025-02-24
Payer: MEDICARE

## 2025-02-24 DIAGNOSIS — N40.1 BENIGN PROSTATIC HYPERPLASIA WITH URINARY OBSTRUCTION: ICD-10-CM

## 2025-02-24 DIAGNOSIS — N13.8 BENIGN PROSTATIC HYPERPLASIA WITH URINARY OBSTRUCTION: ICD-10-CM

## 2025-02-24 DIAGNOSIS — R33.8 ACUTE URINARY RETENTION: ICD-10-CM

## 2025-02-24 DIAGNOSIS — C61 PROSTATE CANCER: Primary | ICD-10-CM

## 2025-02-24 PROCEDURE — 99213 OFFICE O/P EST LOW 20 MIN: CPT | Mod: 24,S$GLB,, | Performed by: UROLOGY

## 2025-02-24 PROCEDURE — 99999 PR PBB SHADOW E&M-EST. PATIENT-LVL I: CPT | Mod: PBBFAC,,, | Performed by: UROLOGY

## 2025-02-24 RX ORDER — TAMSULOSIN HYDROCHLORIDE 0.4 MG/1
0.4 CAPSULE ORAL DAILY
Qty: 30 CAPSULE | Refills: 0 | Status: SHIPPED | OUTPATIENT
Start: 2025-02-24 | End: 2025-03-26

## 2025-02-24 NOTE — LETTER
February 24, 2025      [facility]  [id number]  [place] [address]  Phone: [phone]  Fax: [phone]       Patient: Hector Smith   YOB: 1960  Date of Visit: 02/24/2025    To Whom It May Concern:    Miko Smith  was at [facility] on 02/24/2025. The patient may return to work without restrictions. If you have any questions or concerns, or if I can be of further assistance, please do not hesitate to contact me.    Sincerely,    Ron Lassiter MD

## 2025-02-24 NOTE — PROGRESS NOTES
Chief Complaint:   Encounter Diagnoses   Name Primary?    Prostate cancer Yes    Benign prostatic hyperplasia with urinary obstruction        HPI:    2/24/25- patient is voiding well until this weekend when he again went into acute urinary retention.    64-year-old gentleman who comes in with the acute urinary retention requiring Rodrigues catheterizations.  Apparently patient had an episode of this proximally 2 weeks ago, after 4 days the catheter was removed by the outside urologist and he was voiding well.  Prior to this no real complaints, only getting up 1 time per night.  No significant lower urinary tract symptoms on daily tamsulosin.  No dysuria, no gross hematuria, no microscopic hematuria, he does have a remote history of smoking.  Patient has no family history of urological cancers, his mother did have stones though.  Patient has had no previous urological history.  Unfortunately just a few days ago he again went into acute urinary retention requiring Rodrigues catheter.  Postvoid residual was 921 mL.  Patient since that time has continued to have with a Rodrigues catheter would like her to be removed, he is up to his tamsulosin to b.i.d. of note.  Patient does take Viagra 100 mg on occasion, no issues.  Patient is concerned that he did try a different alcoholic beverage and this could be related.    Allergies:  Adhesive and Suture, silk    Medications:  See MAR    Review of Systems:  General: No fever, chills, fatigability, or weight loss.  Skin: No rashes, itching, or changes in color or texture of skin.  Chest: Denies TAMEZ, cyanosis, wheezing, cough, and sputum production.  Abdomen: Appetite fine. No weight loss. Denies diarrhea, abdominal pain, hematemesis, or blood in stool.  Musculoskeletal: No joint stiffness or swelling. Denies back pain.  : As above.  All other review of systems negative.    PMH:   has a past medical history of Diabetes mellitus, type 2, Hyperlipidemia, and Hypertension.    PSH:   has a  past surgical history that includes Spinal fusion (N/A, 2019).    FamHx: family history is not on file.    SocHx:  reports that he has quit smoking. He has never used smokeless tobacco. He reports that he does not drink alcohol and does not use drugs.      Physical Exam:  Vitals:    12/12/24 1420   BP: 109/70   Pulse: 70     General: A&Ox3, no apparent distress, no deformities  Neck: No masses, normal ROM  Lungs: normal inspiration, no use of accessory muscles  Heart: normal pulse, no arrhythmias  Abdomen: Soft, NT, ND, no masses, no hernias, no hepatosplenomegaly  Skin: The skin is warm and dry. No jaundice.  Ext: No c/c/e.  : 12/24- Test desc sarah beth, no abnormalities of epididymus. Normal penile and scrotal skin. Meatus normal.    Labs/Studies:   PVR 0 ml 2/25  TURP Gl9 1/25  Voiding trial 130 mL instilled, 200 mL voided, PVR 42 mL 12/4/24  Cystoscopy lateral lobe coaptation, small median lobe 12/24  PSA 2.9 7/24  PSA 2.6 7/23  PET-CT known site within the prostate, multiple pelvic and retroperitoneal avid lymph nodes, possible involvement right seminal vesicle 1/25    Impression/Plan: t    1. BPH with obstruction-  TURP  1/6/25    Patient was voiding well until last week, he will get back on tamsulosin.  We have instructed them on [facility], follow up as previously scheduled.  In addition we order urodynamics in the future.      2. 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  Will obtain consultation to Hematology/Oncology, patient is scheduled for fiducial marker placement.  Patient had Lupron earlier this month.    3. Erectile dysfunction- Viagra 100 mg works well.

## 2025-02-26 ENCOUNTER — OFFICE VISIT (OUTPATIENT)
Dept: PODIATRY | Facility: CLINIC | Age: 65
End: 2025-02-26
Payer: MEDICARE

## 2025-02-26 DIAGNOSIS — M1A.3790 CHRONIC GOUT DUE TO RENAL IMPAIRMENT INVOLVING TOE WITHOUT TOPHUS, UNSPECIFIED LATERALITY: ICD-10-CM

## 2025-02-26 DIAGNOSIS — M79.674 PAIN IN TOES OF BOTH FEET: ICD-10-CM

## 2025-02-26 DIAGNOSIS — N18.31 STAGE 3A CHRONIC KIDNEY DISEASE: ICD-10-CM

## 2025-02-26 DIAGNOSIS — B35.1 ONYCHOMYCOSIS: ICD-10-CM

## 2025-02-26 DIAGNOSIS — E11.8 TYPE 2 DIABETES MELLITUS WITH COMPLICATION: ICD-10-CM

## 2025-02-26 DIAGNOSIS — E11.9 COMPREHENSIVE DIABETIC FOOT EXAMINATION, TYPE 2 DM, ENCOUNTER FOR: Primary | ICD-10-CM

## 2025-02-26 DIAGNOSIS — M79.675 PAIN IN TOES OF BOTH FEET: ICD-10-CM

## 2025-02-26 PROCEDURE — 99999 PR PBB SHADOW E&M-EST. PATIENT-LVL III: CPT | Mod: PBBFAC,,, | Performed by: PODIATRIST

## 2025-02-26 RX ORDER — INDOMETHACIN 50 MG/1
50 CAPSULE ORAL 3 TIMES DAILY PRN
Qty: 90 CAPSULE | Refills: 1 | Status: SHIPPED | OUTPATIENT
Start: 2025-02-26 | End: 2025-03-28

## 2025-02-26 NOTE — PROGRESS NOTES
Subjective:       Patient ID: Hector Smith is a 64 y.o. male.    Chief Complaint: Diabetic Foot Exam (Diabetic foot care, no pain, diabetic )      HPI: Hector Smith presents to the office today, under referral by their Primary Care Provider, Ochoa Lane MD, for his annual diabetic foot assessment and risk evalution Patient is a DMII. Patient states no complications due to the disease state. This patient last saw his/her primary care provider on 2/4/2025. States needing refill for Indomethacin for gout flares prn.      Hemoglobin A1C   Date Value Ref Range Status   07/11/2024 6.3 <=6.5 % Final     Comment:       This assay method is useful in the diagnosis of diabetes  mellitus, identification of patients at risk for developing  diabetes, and monitoring of patients with diabetes  mellitus.  Reference range information and glycemic goals  are based on recommendations from the ADA (American  Diabetes Association).    Hgb A1C Value                Glycemic Goal      <8%                          Less Stringent  <7%                          General (Non-Pregnant Adults)  <6.5%                        More Stringent  5.7% - 6.4%                  Increased risk for diabetes   .    Review of patient's allergies indicates:   Allergen Reactions    Adhesive Rash     Silk Tape    Suture, silk Rash       Past Medical History:   Diagnosis Date    Diabetes mellitus, type 2     Hyperlipidemia     Hypertension        No family history on file.    Social History[1]    Past Surgical History:   Procedure Laterality Date    SPINAL FUSION N/A 2019    TRANSURETHRAL RESECTION OF PROSTATE N/A 1/6/2025    Procedure: TURP (TRANSURETHRAL RESECTION OF PROSTATE);  Surgeon: Ron Lassiter MD;  Location: [facility];  Service: Urology;  Laterality: N/A;       Review of Systems   Constitutional:  Negative for chills, fatigue and fever.   HENT:  Negative for hearing loss.    Eyes:  Negative for photophobia and visual disturbance.    Respiratory:  Negative for cough, chest tightness, shortness of breath and wheezing.    Cardiovascular:  Negative for chest pain and palpitations.   Gastrointestinal:  Negative for constipation, diarrhea, nausea and vomiting.   Endocrine: Negative for cold intolerance and heat intolerance.   Genitourinary:  Negative for flank pain.   Musculoskeletal:  Positive for gait problem. Negative for neck pain and neck stiffness.   Neurological:  Negative for light-headedness and headaches.   Psychiatric/Behavioral:  Negative for sleep disturbance.          Objective:   There were no vitals taken for this visit.    Physical Exam  LOWER EXTREMITY PHYSICAL EXAMINATION    ORTHOPEDIC: Rectus foot type is noted. Equinus contracture is noted. MMT is 5/5. No pains or edema is noted.    VASCULAR: Capillary refill time is less than 3s. Edema is trace. The left dorsalis pedis pulse is 2/4 and the right dorsalis pedis pulse is 2/4. The left posterior tibial pulse is 2/4 and the right posterior tibial pulse is 2/4. Varicosities are absent. Spider veins and telangiectasias are noted. Hair growth is present. Skin appearance is WNL. Proximal to distal warm to warm. Elevation palor is absent. Dependent rubor is absent.    NEUROLOGY: Proprioception is intact. Sensation to light touch is intact. Sensation to pin prick is intact. Vibratory sensation is intact to the left and right lower extremity. Examination with 5.07 Willits Valente monofilament reveals that protective sensation is intact to the left and right plantar surfaces of the foot and digits, as the patient has sensation/detection at greater than 4 distinct points of contact.     DERMATOLOGY: Skin is supple, moist, dry, and intact. On the left foot, nails 1 and 2 are suggestive of onychomycotic changes. On the right foot, nails 1 and 3 are suggestive of onychomycotic changes. These nail plates are thickened, are dystrophic, chaulky in appearance and malodorous with substantial  subungual debris. These nail plates are yellow to brown in appearance. The remaining nail plates are elongated and do not have suggestive clinical features of onychomycosis.     Assessment:     1. Comprehensive diabetic foot examination, type 2 DM, encounter for    2. Type 2 diabetes mellitus with complication    3. Onychomycosis    4. Pain in toes of both feet    5. 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  Dispense: 90 capsule; Refill: 1    Stage 3a chronic kidney disease  -     indomethacin (INDOCIN) 50 MG capsule; Take 1 capsule (50 mg total) by mouth 3 (three) times daily as needed (Gout Flare).  Dispense: 90 capsule; Refill: 1      Onychomycotic nail plates, as outlined above, are sharply debrided with double action nail nipper, and/or with the assistance of a mechanical rotary marisol for reduction of pains. Nails are reduced in terms of length, width and girth with removal of subungual debris to facilitate pain free weight bearing and ambulation. Elongated nails as outlined in the objective portion of this note, were trimmed to appropriate length for alleviation/reduction of pains as well. Follow up in approx. 4-6 months.       Protective Sensation (w/ 10 gram monofilament):  Right: Intact  Left: Intact    Visual Inspection:  Normal -  Bilateral    Pedal Pulses:   Right: Present  Left: Present    Posterior Tibialis Pulses:   Right:Present  Left: Present            Future Appointments   Date Time Provider Department Center   3/21/2025  10:00 AM Van Espinoza MD [facility] HEM [facility]C [facility]   2025  8:30 AM Ron Lassiter MD [facility]   2025  8:30 AM [facility]   2025  8:30 AM [facility], RADIATION [facility] RAD [facility]   2025  8:15 AM Ron Lassiter MD [facility] [place]            [1]   Social History  Socioeconomic History    Marital status:    Tobacco Use    Smoking status: Former     Current packs/day: 0.00     Types: Cigarettes     Quit date:      Years since quittin.1    Smokeless tobacco: Never   Substance and Sexual Activity    Alcohol use: Never    Drug use: Never    Sexual activity: Not Currently     Social Drivers of Health     Financial Resource Strain: Medium Risk (2025)    Overall Financial Resource Strain (CARDIA)     Difficulty of Paying Living Expenses: Somewhat hard   Food Insecurity: No Food Insecurity (2025)    Hunger Vital Sign     Worried About Running Out of Food in the Last Year: Never true     Ran Out of Food in the Last Year: Never true   Transportation Needs: No Transportation Needs (2025)    PRAPARE - Transportation     Lack of Transportation (Medical): No     Lack of Transportation (Non-Medical): No   Physical Activity: Insufficiently Active (2025)    Exercise Vital Sign     Days of Exercise per Week: 2 days     Minutes of Exercise per Session: 30 min   Stress: Stress Concern Present (2025)    [place] [employer] - Occupational Stress Questionnaire     Feeling of Stress : To some extent   Housing Stability: Low Risk  (2025)    Housing Stability Vital Sign     Unable to Pay for Housing in the Last Year: No     Homeless in the Last Year: No

## 2025-02-27 ENCOUNTER — TELEPHONE (OUTPATIENT)
Dept: UROLOGY | Facility: CLINIC | Age: 65
End: 2025-02-27
Payer: MEDICARE

## 2025-02-27 NOTE — TELEPHONE ENCOUNTER
To Whom It May Concern     Patient is cathing indefinitely due to retention with BPH     [facility]   [facility] [place]

## 2025-03-11 ENCOUNTER — TELEPHONE (OUTPATIENT)
Dept: UROLOGY | Facility: CLINIC | Age: 65
End: 2025-03-11
Payer: MEDICARE

## 2025-03-20 PROBLEM — I50.9 CHF (CONGESTIVE HEART FAILURE): Status: ACTIVE | Noted: 2025-03-20

## 2025-03-20 PROBLEM — E11.42 DIABETIC PERIPHERAL NEUROPATHY: Status: ACTIVE | Noted: 2024-03-25

## 2025-03-20 PROBLEM — E11.9 DIABETES MELLITUS: Status: ACTIVE | Noted: 2025-03-20

## 2025-03-20 PROBLEM — Z95.810 PRESENCE OF CARDIAC DEFIBRILLATOR: Status: ACTIVE | Noted: 2024-05-30

## 2025-03-20 PROBLEM — I26.99 ACUTE PULMONARY EMBOLISM: Status: ACTIVE | Noted: 2017-08-22

## 2025-03-20 PROBLEM — Z98.1 S/P CERVICAL SPINAL FUSION: Status: ACTIVE | Noted: 2019-01-24

## 2025-03-21 ENCOUNTER — OFFICE VISIT (OUTPATIENT)
Dept: HEMATOLOGY/ONCOLOGY | Facility: CLINIC | Age: 65
End: 2025-03-21
Payer: MEDICARE

## 2025-03-21 VITALS
HEART RATE: 69 BPM | WEIGHT: 184.94 LBS | DIASTOLIC BLOOD PRESSURE: 64 MMHG | BODY MASS INDEX: 34.03 KG/M2 | TEMPERATURE: 97 F | SYSTOLIC BLOOD PRESSURE: 102 MMHG | OXYGEN SATURATION: 96 % | HEIGHT: 62 IN

## 2025-03-21 DIAGNOSIS — I50.9 CONGESTIVE HEART FAILURE, UNSPECIFIED HF CHRONICITY, UNSPECIFIED HEART FAILURE TYPE: ICD-10-CM

## 2025-03-21 DIAGNOSIS — E11.42 TYPE 2 DIABETES MELLITUS WITH DIABETIC POLYNEUROPATHY, WITHOUT LONG-TERM CURRENT USE OF INSULIN: ICD-10-CM

## 2025-03-21 DIAGNOSIS — R30.0 DYSURIA: ICD-10-CM

## 2025-03-21 DIAGNOSIS — C61 PROSTATE CANCER: Primary | ICD-10-CM

## 2025-03-21 DIAGNOSIS — Z79.818 ANDROGEN DEPRIVATION THERAPY: ICD-10-CM

## 2025-03-21 DIAGNOSIS — E66.01 MORBID (SEVERE) OBESITY DUE TO EXCESS CALORIES: ICD-10-CM

## 2025-03-21 DIAGNOSIS — I10 ESSENTIAL HYPERTENSION: ICD-10-CM

## 2025-03-21 DIAGNOSIS — Z79.899 LONG TERM CURRENT USE OF THERAPEUTIC DRUG: ICD-10-CM

## 2025-03-21 PROCEDURE — 99999 PR PBB SHADOW E&M-EST. PATIENT-LVL V: CPT | Mod: PBBFAC,,, | Performed by: HOSPITALIST

## 2025-03-21 RX ORDER — AMLODIPINE BESYLATE 5 MG/1
5 TABLET ORAL DAILY
COMMUNITY

## 2025-03-21 RX ORDER — METOPROLOL TARTRATE AND HYDROCHLOROTHIAZIDE 50; 25 MG/1; MG/1
TABLET ORAL
COMMUNITY
End: 2025-03-21

## 2025-03-21 RX ORDER — BEVACIZUMAB 100 MG/4ML
INJECTION, SOLUTION INTRAVENOUS
COMMUNITY
End: 2025-03-21

## 2025-03-21 NOTE — ASSESSMENT & PLAN NOTE
Given high risk node disease with signficant metastses to pelvic lymph nodes I think he deserves intensification of his ADT. However, given his signficant cardiac comorbidity and prior stroke I am hesitant to use abiraterone (cardiac risk) or enzalutamide (seizure risk). Simillarly would prefer darolutamide over apalutamide to minimize CNS exposure. Therefore plan for 2 years of concurrent ADT + darolutamide with concurrent defintiive EBRT.  - Con't ADT   - Received Lupron 45mg 02/13/25; next due 07/31/25  - RX for darolutamide sent to [facility], can start on arrival  - DEXA scan; Ca/D  - Send [facility]  - FU with me 6 weeks

## 2025-03-21 NOTE — PATIENT INSTRUCTIONS
We discussed your recent diagnosis of prostate cancer. It is a higher risk prostate cancer that looks like it has started to spread to nearby lymph nodes in the pelvis. We discussed treatment options for prostate cancer that has spread to nearby lymph nodes. We recommend curative intent radiation therapy along with longer course (2-3 years) of hormonal therapy. Specifically I recommend testosterone lowering medication (you already started Lupron) along with testosterone blocking medications. Given you heart issues and prior stroke, I recommend darolutamide compared to some of the other hormone pills.     We discussed typical side effects of hormone therapy including fatigue, weight gain, loss libido, forgetfulness, hot flashes, and muscle loss. Other longer term risks can include increased risk of cardiovascular disease and osteoporosis.    We will send a blood test to screen for a  high risk cancer gene ([facility]). If it shows a high risk family gene, we will have you follow up with our cancer genetics clinic.    Please start taking vitamin D 1000 IU daily; I called a prescription into your local pharmacy. Please get 4 servings of calcium daily; if you cannot get 4 servings of dairy daily you can take a calcium supplement. We will schedule a bone mineral density test.    Prescription for darolutamide called into the [facility]. They will contact you in the next few days to arrange delivery. You can start taking it when you get the medication.    Will follow up with me about a month after starting your darolutamide.    Anticipate radiation treatment to start in the next few months.

## 2025-03-21 NOTE — PROGRESS NOTES
[facility]: New patient visit  Best Contact Phone Number(s): There are no phone numbers on file.      Cancer/Stage/TNM:    Cancer Staging   Prostate cancer  Staging form: Prostate, AJCC 8th Edition  - Clinical: Stage KAILEY (cT3b, cN1, cM0, PSA: 2.9, Grade Group: 5) - Signed by Shola Rios MD on 2/18/2025        Reason for visit:  Regional node positive prostate cancer    Molecular:  Germline Testing: PND    Treatment History:   02/13/25 - ADT    HPI:   Hector Smith is a 64 y.o. 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 Initially diagnosed via TURP 01/2025 in the setting of recurrent acute urinary retention. PSMA PET imaging 01/2025 showed PSMA avide pelvic LAD along with involvement of the right SV. He started ADT 02/13/25. He presents to medical oncology clinic for initial evaluation.    Since TUR he has had ongoing frequent urination but flow is much better. Has some associated dribbling.  So significant urgency. Has nocturia 2x. Continues tamsulosin. He does have issues with more frequent constipation that affects his urination. Has BM about every 2 days, previously was much more regular. No hematochezia. Appettie is OK. No N/V. Weight up about 10 pounds over the last few months. Energy is OK. No CP or shortness of breath. Has a mild cough. No FC. Ecoli UTI 2/22/25. Treated with Bactrim. Recently Rx'd nitrofurantoin per his PCP, but hasn't started taking.       Heh as history of DM2. Currently on Farxiga and Janumet; also takes gabapentin prn for neuropathy. He has history of HTN and CHF, on Entresto, metoprolol, amlodipine and lasix. Has a defibrillator in place; discharged about 6 months ago while he was asleep. Follows with Dr. Dsouza at [facility]. Thinks he may have had a remote stroke based on outside imaging about a year ago. Has some chronic right arm weakness. He remains on clopidogrel and atorvastatin. No known seizures. Also with prior cervical neck fusion. Has  history of gout; last about 7 months ago. Takes indomethacin +/- colchicine prn. Takes buspar prn anxiety.         History has been obtained by chart review and discussion with the patient.     Oncology History Overview Note     11/2024: New onset urinary retention requiring batres catheterization    1/6/25 TURP  PROSTATE CHIPS, TRANSURETHRAL RESECTION:   - Prostatic adenocarcinoma, Derby score 4+5=9 (Grade group 5) involving 11-20% of the entirely submitted specimen.   - Perineural invasion is identified.   - Lymphovascular invasion is identified.   - Additional findings: Benign prostatic hyperplasia.     1/29/25: PSMA PET CT  Impression:  1. Ill-defined masslike nodularity of the left inferior prostate favored to correspond to the known prostatic adenocarcinoma.  2. Multiple pelvic and retroperitoneal PLY avid lymph nodes.  There is suspected involvement of the right seminal vesicle.  3. Free air within the bladder-recommend correlation for recent instrumentation; otherwise, possible fistula formation to the rectum should be considered.    2/7/25: PSA 6.4    02/13/25: Lupron 45mg     Prostate cancer   2/7/2025 Initial Diagnosis    Prostate cancer     2/18/2025 Cancer Staged    Staging form: Prostate, AJCC 8th Edition  - Clinical: Stage KAILEY (cT3b, cN1, cM0, PSA: 2.9, Grade Group: 5)           Past Medical History:   Diagnosis Date    Diabetes mellitus, type 2     Hyperlipidemia     Hypertension          Past Surgical History:   Procedure Laterality Date    SPINAL FUSION N/A 2019    TRANSURETHRAL RESECTION OF PROSTATE N/A 1/6/2025    Procedure: TURP (TRANSURETHRAL RESECTION OF PROSTATE);  Surgeon: Ron Lassiter MD;  Location: [facility];  Service: Urology;  Laterality: N/A;         Review of patient's allergies indicates:   Allergen Reactions    Adhesive Rash     Silk Tape    Suture, silk Rash         Current Medications[1]     Objective:      Physical Exam:   /64   Pulse 69   Temp 97.4 °F (36.3 °C)  "(Temporal)   Ht 5' 2" (1.575 m)   Wt 83.9 kg (184 lb 15.5 oz)   SpO2 96%   BMI 33.83 kg/m²       ECOG Performance status: (0) Fully active, able to carry on all predisease performance without restriction     Physical Exam  Constitutional:       General: He is not in acute distress.     Appearance: Normal appearance.   HENT:      Head: Normocephalic.   Eyes:      General: No scleral icterus.     Extraocular Movements: Extraocular movements intact.      Conjunctiva/sclera: Conjunctivae normal.   Cardiovascular:      Rate and Rhythm: Normal rate.      Heart sounds: No murmur heard.  Pulmonary:      Effort: Pulmonary effort is normal. No respiratory distress.   Abdominal:      General: There is no distension.      Palpations: Abdomen is soft.   Skin:     General: Skin is warm and dry.   Neurological:      Mental Status: He is alert and oriented to person, place, and time.      Motor: No weakness.   Psychiatric:         Mood and Affect: Mood normal.         Behavior: Behavior normal.         Thought Content: Thought content normal.          Recent Labs:   Lab Results   Component Value Date    WBC 4.06 03/21/2025    RBC 4.66 03/21/2025    HGB 13.4 (L) 03/21/2025    HCT 42.0 03/21/2025     03/21/2025     03/21/2025    K 4.3 03/21/2025     03/21/2025    CO2 26 03/21/2025     03/21/2025    BUN 20 03/21/2025    CREATININE 1.4 03/21/2025    CALCIUM 9.5 03/21/2025    BILITOT 0.4 03/21/2025    AST 56 (H) 03/21/2025    ALT 75 (H) 03/21/2025          Lab Results   Component Value Date    PSADIAG 6.4 (H) 02/07/2025    PSATOTAL 2.9 07/11/2024    PSATOTAL 2.6 07/11/2023    PSATOTAL 2.4 06/23/2022    PSATOTAL 2.1 09/02/2021        Cardiovascular Screening:  Primary care physician: Ochoa Lane MD      The ASCVD Risk score (Willie DK, et al., 2019) failed to calculate for the following reasons:    Cannot find a previous HDL lab    Cannot find a previous total cholesterol lab    EKG:   Results for " "orders placed or performed during the hospital encounter of 01/02/25   EKG 12-lead    Collection Time: 01/02/25 10:44 AM   Result Value Ref Range    QRS Duration 96 ms    [facility] QTC Calculation 479 ms    Narrative    Test Reason : Z01.818,    Vent. Rate :  70 BPM     Atrial Rate :  70 BPM     P-R Int : 252 ms          QRS Dur :  96 ms      QT Int : 444 ms       P-R-T Axes :  54 -28  20 degrees    QTcB Int : 479 ms    Atrial-paced rhythm with prolonged AV conduction  Moderate voltage criteria for LVH, may be normal variant ( R in aVL ,  [place] product )  Inferior infarct ,age undetermined  Abnormal ECG  No previous ECGs available  Confirmed by Van Tracy ([id number]) on 1/2/2025 5:50:25 PM    Referred By: BRE PADRON           Confirmed By: Van Tracy       Body mass index is 33.83 kg/m².    Lab Results   Component Value Date    HGBA1C 6.3 07/11/2024          [facility]    No results found for: "ESIASTBM506C", "OZUHNGRG66AY"     No results found for this or any previous visit.         PSMA PET IMAGING+     Results for orders placed during the hospital encounter of 01/29/25    NM PET CT F 18 PYL PSMA, Midthigh to Vertex    Impression  1. Ill-defined masslike nodularity of the left inferior prostate favored to correspond to the known prostatic adenocarcinoma.  2. Multiple pelvic and retroperitoneal PLY avid lymph nodes.  There is suspected involvement of the right seminal vesicle.  3. Free air within the bladder-recommend correlation for recent instrumentation; otherwise, possible fistula formation to the rectum should be considered.      Electronically signed by: Mj Sam  Date:    01/29/2025  Time:    13:44       I have personally reviewed the above imaging.     Path:   Reviewed pathology as documented above.      Diagnoses:     1. Prostate cancer    2. Essential hypertension    3. Congestive heart failure, unspecified HF chronicity, unspecified heart failure type    4. Type 2 diabetes mellitus with diabetic " polyneuropathy, without long-term current use of insulin    5. Dysuria    6. Androgen deprivation therapy    7. Long term current use of therapeutic drug    8. Morbid (severe) obesity due to excess calories          Assessment and Plan:     1. Prostate cancer  Overview:  High-risk regional node positive prostate cancer. Initially diagnosed via TURP 01/2025 in the setting of recurrent acute urinary retention. PSMA PET imaging 01/2025 showed PSMA avide pelvic LAD along with involvement of the right SV. He started ADT 02/13/25.    Assessment & Plan:  Given high risk node disease with signficant metastses to pelvic lymph nodes I think he deserves intensification of his ADT. However, given his signficant cardiac comorbidity and prior stroke I am hesitant to use abiraterone (cardiac risk) or enzalutamide (seizure risk). Simillarly would prefer darolutamide over apalutamide to minimize CNS exposure. Therefore plan for 2 years of concurrent ADT + darolutamide with concurrent defintiive EBRT.  - Con't ADT   - Received Lupron 45mg 02/13/25; next due 07/31/25  - RX for darolutamide sent to [facility], can start on arrival  - DEXA scan; Ca/D  - Send [facility]  - FU with me 6 weeks    Orders:  -     Ambulatory referral/consult to Hematology / Oncology  -     DXA Bone Density Axial Skeleton 1 or more sites; Future; Expected date: 03/21/2025  -     Vitamin D; Future; Expected date: 03/21/2025  -     CBC Oncology; Standing  -     Comprehensive Metabolic Panel; Standing  -     Prostate Specific Antigen, Diagnostic; Standing  -     Testosterone; Standing  -     Vitamin D; Future; Expected date: 03/21/2025  -     [facility] with RNA; Future; Expected date: 03/21/2025  -     darolutamide 300 mg Tab; Take 600 mg by mouth 2 (two) times a day.  Dispense: 120 tablet; Refill: 5    2. Essential hypertension    3. Congestive heart failure, unspecified HF chronicity, unspecified heart failure type  Overview:  Congestive heart  failure (disorder) (Problem)      4. Type 2 diabetes mellitus with diabetic polyneuropathy, without long-term current use of insulin  Overview:  Diabetes mellitus (Problem)      5. Dysuria  -     Urinalysis, Reflex to Urine Culture Urine, Clean Catch    6. Androgen deprivation therapy  -     DXA Bone Density Axial Skeleton 1 or more sites; Future; Expected date: 03/21/2025  -     Vitamin D; Future; Expected date: 03/21/2025  -     Vitamin D; Future; Expected date: 03/21/2025    7. Long term current use of therapeutic drug  -     DXA Bone Density Axial Skeleton 1 or more sites; Future; Expected date: 03/21/2025  -     Vitamin D; Future; Expected date: 03/21/2025  -     Vitamin D; Future; Expected date: 03/21/2025    8. Morbid (severe) obesity due to excess calories          Follow up:   Route Chart for Scheduling    Med Onc Chart Routing      Follow up with physician 2 months.   Follow up with [facility]    Infusion scheduling note    Injection scheduling note    Labs CBC, CMP and PSA   Scheduling:  Preferred lab:  Lab interval:     Imaging    Pharmacy appointment    Other referrals                           The above information has been reviewed with the patient and all questions have been answered to their apparent satisfaction.  They understand that they can call the clinic with any questions.    Van Espinoza MD MPH  Staff Physician     [facility] [place] [address]  Email: [email or web address]  Phone: o) [phone] (c) [phone]                [1]   Current Outpatient Medications   Medication Sig Dispense Refill    amLODIPine (NORVASC) 5 MG tablet Take 5 mg by mouth once daily.      atorvastatin (LIPITOR) 20 MG tablet Take 20 mg by mouth once daily.      busPIRone (BUSPAR) 5 MG Tab Take 5 mg by mouth 2 (two) times daily.      clopidogreL (PLAVIX) 75 mg tablet Take 75 mg by mouth once daily.      dapagliflozin propanediol (FARXIGA) 10 mg tablet Take 10 mg by  mouth.      ENTRESTO 24-26 mg per tablet Take 1 tablet by mouth 2 (two) times daily.      furosemide (LASIX) 40 MG tablet Take 40 mg by mouth once daily.      gabapentin (NEURONTIN) 300 MG capsule       indomethacin (INDOCIN) 50 MG capsule Take 1 capsule (50 mg total) by mouth 3 (three) times daily as needed (Gout Flare). 90 capsule 1    JANUMET  mg per tablet Take 1 tablet by mouth 2 (two) times daily.      metoprolol succinate (TOPROL-XL) 100 MG 24 hr tablet Take 100 mg by mouth once daily.      sildenafiL (VIAGRA) 100 MG tablet Take 1 tablet (100 mg total) by mouth daily as needed for Erectile Dysfunction. 15 tablet 11    tamsulosin (FLOMAX) 0.4 mg Cap Take 1 capsule (0.4 mg total) by mouth once daily. 30 capsule 0    zolpidem (AMBIEN) 5 MG Tab Take 5 mg by mouth.      colchicine (COLCRYS) 0.6 mg tablet Take 0.6 mg by mouth daily as needed. (Patient not taking: Reported on 3/21/2025)      darolutamide 300 mg Tab Take 600 mg by mouth 2 (two) times a day. 120 tablet 5    ketoconazole (NIZORAL) 2 % cream Apply topically 2 (two) times daily. 30 g 2    nitrofurantoin, macrocrystal-monohydrate, (MACROBID) 100 MG capsule Take 1 capsule (100 mg total) by mouth 2 (two) times daily. (Patient not taking: Reported on 3/21/2025) 20 capsule 0     No current facility-administered medications for this visit.

## 2025-03-24 ENCOUNTER — PATIENT MESSAGE (OUTPATIENT)
Dept: HEMATOLOGY/ONCOLOGY | Facility: CLINIC | Age: 65
End: 2025-03-24
Payer: MEDICARE

## 2025-03-25 ENCOUNTER — TELEPHONE (OUTPATIENT)
Dept: HEMATOLOGY/ONCOLOGY | Facility: CLINIC | Age: 65
End: 2025-03-25
Payer: MEDICARE

## 2025-03-25 NOTE — TELEPHONE ENCOUNTER
Office Visit  3/21/2025  [facility] 2nd [facility]  Default Flowsheet Data (all recorded)    Distress Management      Row Name 03/21/25 0944 03/16/25 0323      Distress Management Score   Distress Score 6 5 (P)       Physical Concerns   Physical Concerns Sleep;Fatigue Pain;Sleep;Sexual health (P)       Emotional Concerns   Emotional Concerns Worry or anxiety Worry or anxiety (P)       Social Concerns   Social Concerns None of these Relationship with spouse or partner (P)       Practical Concerns   Practical Concerns None of these Taking care of myself;Work;Housing/Utilities;Finances (P)       Other Problems   Other Problems pain when urinating --      OTHER   Spiritual or Lutheran Concerns None of these None of these (P)       SW contacted pt re: the above. Pt stated that he's doing ok, and his anxieties have decreased now that he's completed visit. Pt stated that he did not know what to expect. Pt denies need for any additional interventions at this time. Pt will call SW if needs arise. SW will remain available.

## 2025-04-04 ENCOUNTER — PROCEDURE VISIT (OUTPATIENT)
Dept: UROLOGY | Facility: CLINIC | Age: 65
End: 2025-04-04
Payer: MEDICARE

## 2025-04-04 ENCOUNTER — HOSPITAL ENCOUNTER (OUTPATIENT)
Dept: RADIOLOGY | Facility: HOSPITAL | Age: 65
Discharge: HOME | End: 2025-04-04
Attending: UROLOGY
Payer: MEDICARE

## 2025-04-04 VITALS
TEMPERATURE: 99 F | WEIGHT: 179 LBS | RESPIRATION RATE: 14 BRPM | DIASTOLIC BLOOD PRESSURE: 65 MMHG | HEART RATE: 69 BPM | SYSTOLIC BLOOD PRESSURE: 110 MMHG | BODY MASS INDEX: 32.94 KG/M2 | HEIGHT: 62 IN

## 2025-04-04 DIAGNOSIS — C61 PROSTATE CANCER: Primary | ICD-10-CM

## 2025-04-04 DIAGNOSIS — C61 PROSTATE CANCER: ICD-10-CM

## 2025-04-04 DIAGNOSIS — N13.8 BENIGN PROSTATIC HYPERPLASIA WITH URINARY OBSTRUCTION: ICD-10-CM

## 2025-04-04 DIAGNOSIS — N40.1 BENIGN PROSTATIC HYPERPLASIA WITH URINARY OBSTRUCTION: ICD-10-CM

## 2025-04-04 PROCEDURE — 74018 RADEX ABDOMEN 1 VIEW: CPT | Mod: TC

## 2025-04-04 PROCEDURE — 74018 RADEX ABDOMEN 1 VIEW: CPT | Mod: 26,,, | Performed by: RADIOLOGY

## 2025-04-04 RX ORDER — LIDOCAINE HYDROCHLORIDE 20 MG/ML
JELLY TOPICAL
Status: COMPLETED | OUTPATIENT
Start: 2025-04-04 | End: 2025-04-04

## 2025-04-04 RX ADMIN — LIDOCAINE HYDROCHLORIDE 11 ML: 20 JELLY TOPICAL at 08:04

## 2025-04-04 NOTE — LETTER
April 4, 2025      [facility]  [address] [place] [address] DR PETAR AREVALO [address]  Phone: [phone]  Fax: [phone]       Patient: Hector Smith   YOB: 1960  Date of Visit: 04/04/2025    To Whom It May Concern:    Miko Smith  was seen on 04/04/2025. He may return to work on with no restrictions on 04/07/2025. If you have any questions or concerns, or if I can be of further assistance, please do not hesitate to contact me.    Sincerely,    Ron Lassiter MD

## 2025-04-04 NOTE — PROCEDURES
Procedures  Chief Complaint:   Encounter Diagnoses   Name Primary?    Prostate cancer Yes    Benign prostatic hyperplasia with urinary obstruction        HPI:    4/4/25- here today for fiducial marker placement.  Has only had to cath once, otherwise voiding well.    64-year-old gentleman who comes in with the acute urinary retention requiring Rodrigues catheterizations.  Apparently patient had an episode of this proximally 2 weeks ago, after 4 days the catheter was removed by the outside urologist and he was voiding well.  Prior to this no real complaints, only getting up 1 time per night.  No significant lower urinary tract symptoms on daily tamsulosin.  No dysuria, no gross hematuria, no microscopic hematuria, he does have a remote history of smoking.  Patient has no family history of urological cancers, his mother did have stones though.  Patient has had no previous urological history.  Unfortunately just a few days ago he again went into acute urinary retention requiring Rodrigues catheter.  Postvoid residual was 921 mL.  Patient since that time has continued to have with a Rodrigues catheter would like her to be removed, he is up to his tamsulosin to b.i.d. of note.  Patient does take Viagra 100 mg on occasion, no issues.  Patient is concerned that he did try a different alcoholic beverage and this could be related.    Allergies:  Adhesive and Suture, silk    Medications:  See MAR    Review of Systems:  General: No fever, chills, fatigability, or weight loss.  Skin: No rashes, itching, or changes in color or texture of skin.  Chest: Denies TAMEZ, cyanosis, wheezing, cough, and sputum production.  Abdomen: Appetite fine. No weight loss. Denies diarrhea, abdominal pain, hematemesis, or blood in stool.  Musculoskeletal: No joint stiffness or swelling. Denies back pain.  : As above.  All other review of systems negative.    PMH:   has a past medical history of Diabetes mellitus, type 2, Hyperlipidemia, and  Hypertension.    PSH:   has a past surgical history that includes Spinal fusion (N/A, 2019).    FamHx: family history is not on file.    SocHx:  reports that he has quit smoking. He has never used smokeless tobacco. He reports that he does not drink alcohol and does not use drugs.      Physical Exam:  Vitals:    12/12/24 1420   BP: 109/70   Pulse: 70     General: A&Ox3, no apparent distress, no deformities  Neck: No masses, normal ROM  Lungs: normal inspiration, no use of accessory muscles  Heart: normal pulse, no arrhythmias  Abdomen: Soft, NT, ND, no masses, no hernias, no hepatosplenomegaly  Skin: The skin is warm and dry. No jaundice.  Ext: No c/c/e.  : 12/24- Test desc sarah beth, no abnormalities of epididymus. Normal penile and scrotal skin. Meatus normal.    Labs/Studies:   PVR 0 ml 2/25  Fiducials 4/4/25  TURP Gl9 1/25  Voiding trial 130 mL instilled, 200 mL voided, PVR 42 mL 12/4/24  Cystoscopy lateral lobe coaptation, small median lobe 12/24  PSA 0.91 3/25  PSA 2.66 3/25  PSA 2.9 7/24  PSA 2.6 7/23  PET-CT known site within the prostate, multiple pelvic and retroperitoneal avid lymph nodes, possible involvement right seminal vesicle 1/25    Procedure: (1) Transrectal ultrasound of prostate                     (2) Ultrasound Guidance for placement of fiduciary markers    Detail: After proper consents were obtained, the patient was prepped and draped in normal fashion in the left lateral decubitus position for TRUS.  The U/S with rectal probe was used to identify and assess the prostate.  Fiduciary markers were then placed with the provided needles.  These were place in both the right and left base, also one was placed at the right apex for triangulation.  Antibiotic prophylaxis was provided using intrasmuscular dosing.      Impression/Plan:     1. BPH with obstruction-  TURP  1/6/25    Patient is currently voiding well on tamsulosin, only had to use the catheter once.      2. Prostate cancer-  darolutamide   3/25, Lupron  2/13/25    New diagnosis of high-risk prostatic adenocarcinoma, PET-CT is suspicious for davi and seminal vesicle involvement.  Urology Oncology initiated darolutamide, Radiation Oncology to plan for external beam radiation therapy.  Patient tolerated marker placement today, will obtain a KUB.  I will see him back in 3 months.    3. Erectile dysfunction- Viagra 100 mg works well, currently not an issue.

## 2025-04-04 NOTE — PROGRESS NOTES
..Using aseptic technique, Rocephin 1 gm adm to right ventrogluteal as per written order of Dr. Lassiter.  Pt tolerated procedure well.

## 2025-04-28 ENCOUNTER — HOSPITAL ENCOUNTER (OUTPATIENT)
Dept: RADIATION THERAPY | Facility: HOSPITAL | Age: 65
Discharge: HOME | End: 2025-04-28
Attending: INTERNAL MEDICINE
Payer: MEDICARE

## 2025-04-28 ENCOUNTER — HOSPITAL ENCOUNTER (OUTPATIENT)
Dept: RADIOLOGY | Facility: HOSPITAL | Age: 65
Discharge: HOME | End: 2025-04-28
Attending: INTERNAL MEDICINE
Payer: MEDICARE

## 2025-04-28 PROCEDURE — 77014 HC CT GUIDANCE RADIATION THERAPY FLDS PLACEMENT: CPT | Mod: TC | Performed by: SPECIALIST

## 2025-04-28 PROCEDURE — 77334 RADIATION TREATMENT AID(S): CPT | Mod: 26,,, | Performed by: SPECIALIST

## 2025-04-28 PROCEDURE — 77334 RADIATION TREATMENT AID(S): CPT | Mod: TC | Performed by: SPECIALIST

## 2025-04-28 PROCEDURE — 77014 PR  CT GUIDANCE PLACEMENT RAD THERAPY FIELDS: CPT | Mod: 26,,, | Performed by: SPECIALIST

## 2025-04-28 PROCEDURE — 77263 THER RADIOLOGY TX PLNG CPLX: CPT | Mod: ,,, | Performed by: SPECIALIST

## 2025-05-01 ENCOUNTER — HOSPITAL ENCOUNTER (OUTPATIENT)
Dept: RADIATION THERAPY | Facility: HOSPITAL | Age: 65
Discharge: HOME | End: 2025-05-01
Attending: SPECIALIST
Payer: MEDICARE

## 2025-05-09 PROCEDURE — 77301 RADIOTHERAPY DOSE PLAN IMRT: CPT | Mod: TC | Performed by: SPECIALIST

## 2025-05-09 PROCEDURE — 77301 RADIOTHERAPY DOSE PLAN IMRT: CPT | Mod: 26,,, | Performed by: SPECIALIST

## 2025-05-10 PROCEDURE — 77338 DESIGN MLC DEVICE FOR IMRT: CPT | Mod: 26,,, | Performed by: SPECIALIST

## 2025-05-10 PROCEDURE — 77338 DESIGN MLC DEVICE FOR IMRT: CPT | Mod: TC | Performed by: SPECIALIST

## 2025-05-10 PROCEDURE — 77300 RADIATION THERAPY DOSE PLAN: CPT | Mod: 26,,, | Performed by: SPECIALIST

## 2025-05-10 PROCEDURE — 77300 RADIATION THERAPY DOSE PLAN: CPT | Mod: TC | Performed by: SPECIALIST

## 2025-05-12 ENCOUNTER — DOCUMENTATION ONLY (OUTPATIENT)
Dept: RADIATION ONCOLOGY | Facility: CLINIC | Age: 65
End: 2025-05-12
Payer: MEDICARE

## 2025-05-12 PROCEDURE — 77014 PR  CT GUIDANCE PLACEMENT RAD THERAPY FIELDS: CPT | Mod: 26,,, | Performed by: SPECIALIST

## 2025-05-12 PROCEDURE — 77385 HC IMRT, SIMPLE: CPT | Performed by: SPECIALIST

## 2025-05-12 NOTE — PLAN OF CARE
Day 1 of xrt to the prostate. Pelvis hand out and verbal instrucations given to patient. Reviewed skin care and provided contact information. Patient verbalized understanding and had no concerns at this time.

## 2025-05-13 PROCEDURE — 77014 PR  CT GUIDANCE PLACEMENT RAD THERAPY FIELDS: CPT | Mod: 26,,, | Performed by: SPECIALIST

## 2025-05-13 PROCEDURE — 77385 HC IMRT, SIMPLE: CPT | Performed by: SPECIALIST

## 2025-05-14 PROCEDURE — 77385 HC IMRT, SIMPLE: CPT | Performed by: SPECIALIST

## 2025-05-14 PROCEDURE — 77014 PR  CT GUIDANCE PLACEMENT RAD THERAPY FIELDS: CPT | Mod: 26,,, | Performed by: SPECIALIST

## 2025-05-19 ENCOUNTER — DOCUMENTATION ONLY (OUTPATIENT)
Dept: RADIATION ONCOLOGY | Facility: CLINIC | Age: 65
End: 2025-05-19
Payer: MEDICARE

## 2025-05-19 NOTE — PLAN OF CARE
Day 5 of xrt to the prostate. Patient tolerating treatment well and has no complaints at this time. Will continue to monitor.

## 2025-05-23 ENCOUNTER — LAB VISIT (OUTPATIENT)
Dept: LAB | Facility: HOSPITAL | Age: 65
End: 2025-05-23
Attending: HOSPITALIST
Payer: MEDICARE

## 2025-05-23 ENCOUNTER — OFFICE VISIT (OUTPATIENT)
Dept: HEMATOLOGY/ONCOLOGY | Facility: CLINIC | Age: 65
End: 2025-05-23
Payer: MEDICARE

## 2025-05-23 VITALS
BODY MASS INDEX: 34.5 KG/M2 | TEMPERATURE: 98 F | DIASTOLIC BLOOD PRESSURE: 81 MMHG | SYSTOLIC BLOOD PRESSURE: 136 MMHG | HEART RATE: 70 BPM | WEIGHT: 187.5 LBS | OXYGEN SATURATION: 98 % | HEIGHT: 62 IN

## 2025-05-23 DIAGNOSIS — E11.42 TYPE 2 DIABETES MELLITUS WITH DIABETIC POLYNEUROPATHY, WITHOUT LONG-TERM CURRENT USE OF INSULIN: ICD-10-CM

## 2025-05-23 DIAGNOSIS — I10 ESSENTIAL HYPERTENSION: ICD-10-CM

## 2025-05-23 DIAGNOSIS — C77.5 MALIGNANT NEOPLASM METASTATIC TO INTRAPELVIC LYMPH NODE: ICD-10-CM

## 2025-05-23 DIAGNOSIS — C61 PROSTATE CANCER: Primary | ICD-10-CM

## 2025-05-23 DIAGNOSIS — C61 PROSTATE CANCER: ICD-10-CM

## 2025-05-23 DIAGNOSIS — I50.9 CONGESTIVE HEART FAILURE, UNSPECIFIED HF CHRONICITY, UNSPECIFIED HEART FAILURE TYPE: ICD-10-CM

## 2025-05-23 PROBLEM — C77.9 METASTASIS TO LYMPH NODES: Status: ACTIVE | Noted: 2025-05-23

## 2025-05-23 LAB
ABSOLUTE NEUTROPHIL COUNT (OHS): 0.9 K/UL (ref 1.8–7.7)
ALBUMIN SERPL BCP-MCNC: 3.8 G/DL (ref 3.5–5.2)
ALP SERPL-CCNC: 74 UNIT/L (ref 40–150)
ALT SERPL W/O P-5'-P-CCNC: 27 UNIT/L (ref 10–44)
ANION GAP (OHS): 11 MMOL/L (ref 8–16)
AST SERPL-CCNC: 24 UNIT/L (ref 11–45)
BILIRUB SERPL-MCNC: 0.5 MG/DL (ref 0.1–1)
BUN SERPL-MCNC: 14 MG/DL (ref 8–23)
CALCIUM SERPL-MCNC: 9.7 MG/DL (ref 8.7–10.5)
CHLORIDE SERPL-SCNC: 102 MMOL/L (ref 95–110)
CO2 SERPL-SCNC: 24 MMOL/L (ref 23–29)
CREAT SERPL-MCNC: 1.2 MG/DL (ref 0.5–1.4)
ERYTHROCYTE [DISTWIDTH] IN BLOOD BY AUTOMATED COUNT: 16.2 % (ref 11.5–14.5)
GFR SERPLBLD CREATININE-BSD FMLA CKD-EPI: >60 ML/MIN/1.73/M2
GLUCOSE SERPL-MCNC: 86 MG/DL (ref 70–110)
HCT VFR BLD AUTO: 37.6 % (ref 40–54)
HGB BLD-MCNC: 12 GM/DL (ref 14–18)
IMM GRANULOCYTES # BLD AUTO: 0.01 K/UL (ref 0–0.04)
MCH RBC QN AUTO: 28.8 PG (ref 27–31)
MCHC RBC AUTO-ENTMCNC: 31.9 G/DL (ref 32–36)
MCV RBC AUTO: 90 FL (ref 82–98)
PLATELET # BLD AUTO: 168 K/UL (ref 150–450)
PMV BLD AUTO: 9.7 FL (ref 9.2–12.9)
POTASSIUM SERPL-SCNC: 4.4 MMOL/L (ref 3.5–5.1)
PROT SERPL-MCNC: 7.3 GM/DL (ref 6–8.4)
PSA SERPL-MCNC: 0.27 NG/ML
RBC # BLD AUTO: 4.16 M/UL (ref 4.6–6.2)
SODIUM SERPL-SCNC: 137 MMOL/L (ref 136–145)
WBC # BLD AUTO: 2.17 K/UL (ref 3.9–12.7)

## 2025-05-23 PROCEDURE — 85027 COMPLETE CBC AUTOMATED: CPT

## 2025-05-23 PROCEDURE — 99999 PR PBB SHADOW E&M-EST. PATIENT-LVL IV: CPT | Mod: PBBFAC,,, | Performed by: HOSPITALIST

## 2025-05-23 PROCEDURE — 36415 COLL VENOUS BLD VENIPUNCTURE: CPT

## 2025-05-23 PROCEDURE — 84153 ASSAY OF PSA TOTAL: CPT

## 2025-05-23 PROCEDURE — 84520 ASSAY OF UREA NITROGEN: CPT

## 2025-05-23 NOTE — ASSESSMENT & PLAN NOTE
Tolerating intensified ADT quite well with minimal side effect aside from mild fatigue and expected mild hot flashes. PSA pending today but good response. Tolerating hormonal therapy well as well.   - Con't ADT + Darolutamide  - Next Lupron 8/1/25  - Needs DEXA; vitamin D  - Con't radiation  - FU with next Lupron

## 2025-05-23 NOTE — PROGRESS NOTES
[facility]: New patient visit  Best Contact Phone Number(s): There are no phone numbers on file.      Cancer/Stage/TNM:    Cancer Staging   Prostate cancer  Staging form: Prostate, AJCC 8th Edition  - Clinical: Stage KAILEY (cT3b, cN1, cM0, PSA: 2.9, Grade Group: 5) - Signed by Shola Rios MD on 2/18/2025        Reason for visit:  Regional node positive prostate cancer    Molecular:  Germline Testing: Negative    Treatment History:   02/13/25 -   ADT  03/27/25 -   Darolutamide  05/12/25 -   EBRT    HPI:   Hector Smith is a 64 y.o. 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 Initially diagnosed via TURP 01/2025 in the setting of recurrent acute urinary retention. PSMA PET imaging 01/2025 showed PSMA avide pelvic LAD along with involvement of the right SV. He started ADT 02/13/25 with addition of darolutamide 03/27/25. He presents to medical oncology clinic for initial evaluation.      Interval Events:    Started darolutamide about 2 months ago and started radiation two weeks OK. Patient feeels well Energy is good. Working full time. Feels a bit drained by the end of the day. Has hot flashes 'now and then'. Overall tolerable. Has fairly stable urination - mild hesitancy since starting RT. Normal flow. Nocturia 2x per night and no dysuria or urgency. Reports normal bowel movements - has some issues with consitpation - stools daily but can be hard. No hematochezia.       History has been obtained by chart review and discussion with the patient.     Oncology History Overview Note     11/2024: New onset urinary retention requiring batres catheterization    1/6/25 TURP  PROSTATE CHIPS, TRANSURETHRAL RESECTION:   - Prostatic adenocarcinoma, [place] score 4+5=9 (Grade group 5) involving 11-20% of the entirely submitted specimen.   - Perineural invasion is identified.   - Lymphovascular invasion is identified.   - Additional findings: Benign prostatic hyperplasia.     1/29/25: PSMA PET  "CT  Impression:  1. Ill-defined masslike nodularity of the left inferior prostate favored to correspond to the known prostatic adenocarcinoma.  2. Multiple pelvic and retroperitoneal PLY avid lymph nodes.  There is suspected involvement of the right seminal vesicle.  3. Free air within the bladder-recommend correlation for recent instrumentation; otherwise, possible fistula formation to the rectum should be considered.    2/7/25: PSA 6.4    02/13/25: Lupron 45mg     Prostate cancer   2/7/2025 Initial Diagnosis    Prostate cancer     2/18/2025 Cancer Staged    Staging form: Prostate, AJCC 8th Edition  - Clinical: Stage KAILEY (cT3b, cN1, cM0, PSA: 2.9, Grade Group: 5)           Past Medical History:   Diagnosis Date    Diabetes mellitus, type 2     Hyperlipidemia     Hypertension          Past Surgical History:   Procedure Laterality Date    SPINAL FUSION N/A 2019    TRANSURETHRAL RESECTION OF PROSTATE N/A 1/6/2025    Procedure: TURP (TRANSURETHRAL RESECTION OF PROSTATE);  Surgeon: Ron Lassiter MD;  Location: [facility];  Service: Urology;  Laterality: N/A;         Review of patient's allergies indicates:   Allergen Reactions    Adhesive Rash     Silk Tape    Suture, silk Rash         Current Medications[1]     Objective:      Physical Exam:   /81   Pulse 70   Temp 97.8 °F (36.6 °C) (Temporal)   Ht 5' 2" (1.575 m)   Wt 85.1 kg (187 lb 8 oz)   SpO2 98%   BMI 34.29 kg/m²       ECOG Performance status: (0) Fully active, able to carry on all predisease performance without restriction     Physical Exam  Constitutional:       General: He is not in acute distress.     Appearance: Normal appearance.   HENT:      Head: Normocephalic.   Eyes:      General: No scleral icterus.     Extraocular Movements: Extraocular movements intact.      Conjunctiva/sclera: Conjunctivae normal.   Cardiovascular:      Rate and Rhythm: Normal rate.      Heart sounds: No murmur heard.  Pulmonary:      Effort: Pulmonary effort is " normal. No respiratory distress.   Abdominal:      General: There is no distension.      Palpations: Abdomen is soft.   Skin:     General: Skin is warm and dry.   Neurological:      Mental Status: He is alert and oriented to person, place, and time.      Motor: No weakness.   Psychiatric:         Mood and Affect: Mood normal.         Behavior: Behavior normal.         Thought Content: Thought content normal.          Recent Labs:   Lab Results   Component Value Date    WBC 2.17 (L) 05/23/2025    RBC 4.16 (L) 05/23/2025    HGB 12.0 (L) 05/23/2025    HCT 37.6 (L) 05/23/2025     05/23/2025     05/23/2025    K 4.4 05/23/2025     05/23/2025    CO2 24 05/23/2025    GLU 86 05/23/2025    BUN 14 05/23/2025    CREATININE 1.2 05/23/2025    CALCIUM 9.7 05/23/2025    BILITOT 0.5 05/23/2025    AST 24 05/23/2025    ALT 27 05/23/2025          Lab Results   Component Value Date    PSADIAG 0.91 03/21/2025    PSADIAG 6.4 (H) 02/07/2025    PSATOTAL 2.9 07/11/2024    PSATOTAL 2.6 07/11/2023    PSATOTAL 2.4 06/23/2022    PSATOTAL 2.1 09/02/2021        Cardiovascular Screening:  Primary care physician: Ochoa Lane MD      The ASCVD Risk score (Willie DK, et al., 2019) failed to calculate for the following reasons:    Cannot find a previous HDL lab    Cannot find a previous total cholesterol lab    EKG:   Results for orders placed or performed during the hospital encounter of 01/02/25   EKG 12-lead    Collection Time: 01/02/25 10:44 AM   Result Value Ref Range    QRS Duration 96 ms    [facility] QTC Calculation 479 ms    Narrative    Test Reason : Z01.818,    Vent. Rate :  70 BPM     Atrial Rate :  70 BPM     P-R Int : 252 ms          QRS Dur :  96 ms      QT Int : 444 ms       P-R-T Axes :  54 -28  20 degrees    QTcB Int : 479 ms    Atrial-paced rhythm with prolonged AV conduction  Moderate voltage criteria for LVH, may be normal variant ( R in aVL ,  [place] product )  Inferior infarct ,age undetermined  Abnormal  ECG  No previous ECGs available  Confirmed by Van Tracy ([id number]) on 1/2/2025 5:50:25 PM    Referred By: BRE PADRON           Confirmed By: Van Tracy       Body mass index is 34.29 kg/m².    Lab Results   Component Value Date    HGBA1C 6.3 07/11/2024          [facility]    Lab Results   Component Value Date    QJLZUKTD19UB 17 (L) 03/21/2025    GKQEWYJE41II 17 (L) 03/21/2025        No results found for this or any previous visit.         PSMA PET IMAGING+     Results for orders placed during the hospital encounter of 01/29/25    NM PET CT F 18 PYL PSMA, Midthigh to Vertex    Impression  1. Ill-defined masslike nodularity of the left inferior prostate favored to correspond to the known prostatic adenocarcinoma.  2. Multiple pelvic and retroperitoneal PLY avid lymph nodes.  There is suspected involvement of the right seminal vesicle.  3. Free air within the bladder-recommend correlation for recent instrumentation; otherwise, possible fistula formation to the rectum should be considered.      Electronically signed by: Mj Sam  Date:    01/29/2025  Time:    13:44       I have personally reviewed the above imaging.     Path:   Reviewed pathology as documented above.      Diagnoses:     1. Prostate cancer    2. Essential hypertension    3. Congestive heart failure, unspecified HF chronicity, unspecified heart failure type    4. Type 2 diabetes mellitus with diabetic polyneuropathy, without long-term current use of insulin    5. 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 Prostate cancer  Overview:  High-risk regional node positive prostate cancer. Initially diagnosed via TURP 01/2025 in the setting of recurrent acute urinary retention. PSMA PET imaging 01/2025 showed PSMA avide pelvic LAD along with involvement of the right SV. He started ADT 02/13/25 with addition of darolutamide 03/27/25.    Assessment & Plan:  Tolerating intensified ADT quite well with  minimal side effect aside from mild fatigue and expected mild hot flashes. PSA pending today but good response. Tolerating hormonal therapy well as well.   - Con't ADT + Darolutamide  - Next Lupron 8/1/25  - Needs DEXA; vitamin D  - Con't radiation  - FU with next Lupron      2. Essential hypertension  Overview:  Home mediations include amlodipine and metoporolol      3. Congestive heart failure, unspecified HF chronicity, unspecified heart failure type  Overview:  Home medications include Entresto, metoprolol, and furosemdie.    Assessment & Plan:  Stable on lasix 40mg daily      4. Type 2 diabetes mellitus with diabetic polyneuropathy, without long-term current use of insulin  Overview:  Home meediations include janumet and Farxiga      5. 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 8/1/25   Follow up with [facility]    Infusion scheduling note    Injection scheduling note Lupron 8/1/25   Labs CBC, CMP and PSA   Scheduling:  Preferred lab:  Lab interval:     Imaging DXA scan      Pharmacy appointment    Other referrals                   Supportive Plan Information  OP PROSTATE LEUPROLIDE (LUPRON) 6 MONTH Van Espinoza MD   Associated Diagnosis: Prostate cancer Stage KAILEY cT3b, cN1, cM0, PSA: 2.9, Grade Group: 5 noted on 2/7/2025   Line of treatment: First Line   Treatment goal: Curative     Upcoming Treatment Dates - OP PROSTATE LEUPROLIDE (LUPRON) 6 MONTH    8/1/2025       Chemotherapy       leuprolide acetate (6 month) injection 45 mg  1/16/2026       Chemotherapy       leuprolide acetate (6 month) injection 45 mg         The above information has been reviewed with the patient and all questions have been answered to their apparent satisfaction.  They understand that they can call the clinic with any questions.    Van Espinoza MD MPH  Staff Physician      [facility]  [address] [place] [id number]  Email: [email or web address]  Phone: o) [phone] (c) [phone]                  [1]   Current Outpatient Medications   Medication Sig Dispense Refill    amLODIPine (NORVASC) 5 MG tablet Take 5 mg by mouth once daily.      atorvastatin (LIPITOR) 20 MG tablet Take 20 mg by mouth once daily.      busPIRone (BUSPAR) 5 MG Tab Take 5 mg by mouth 2 (two) times daily.      clopidogreL (PLAVIX) 75 mg tablet Take 75 mg by mouth once daily.      colchicine (COLCRYS) 0.6 mg tablet Take 0.6 mg by mouth daily as needed.      dapagliflozin propanediol (FARXIGA) 10 mg tablet Take 10 mg by mouth.      darolutamide 300 mg Tab Take 2 tablets (600 mg) by mouth 2 (two) times a day. 120 tablet 5    ENTRESTO 24-26 mg per tablet Take 1 tablet by mouth 2 (two) times daily.      furosemide (LASIX) 40 MG tablet Take 40 mg by mouth once daily.      gabapentin (NEURONTIN) 300 MG capsule       JANUMET  mg per tablet Take 1 tablet by mouth 2 (two) times daily.      metoprolol succinate (TOPROL-XL) 100 MG 24 hr tablet Take 100 mg by mouth once daily.      nitrofurantoin, macrocrystal-monohydrate, (MACROBID) 100 MG capsule Take 1 capsule (100 mg total) by mouth 2 (two) times daily. 20 capsule 0    sildenafiL (VIAGRA) 100 MG tablet Take 1 tablet (100 mg total) by mouth daily as needed for Erectile Dysfunction. 15 tablet 11    zolpidem (AMBIEN) 5 MG Tab Take 5 mg by mouth.      ketoconazole (NIZORAL) 2 % cream Apply topically 2 (two) times daily. 30 g 2    tamsulosin (FLOMAX) 0.4 mg Cap Take 1 capsule (0.4 mg total) by mouth once daily. 30 capsule 0     No current facility-administered medications for this visit.

## 2025-06-02 ENCOUNTER — DOCUMENTATION ONLY (OUTPATIENT)
Dept: RADIATION ONCOLOGY | Facility: CLINIC | Age: 65
End: 2025-06-02
Payer: MEDICARE

## 2025-06-02 ENCOUNTER — HOSPITAL ENCOUNTER (OUTPATIENT)
Dept: RADIATION THERAPY | Facility: HOSPITAL | Age: 65
Discharge: HOME | End: 2025-06-02
Attending: SPECIALIST
Payer: MEDICARE

## 2025-06-02 PROCEDURE — 77336 RADIATION PHYSICS CONSULT: CPT | Performed by: SPECIALIST

## 2025-06-02 PROCEDURE — 77014 PR  CT GUIDANCE PLACEMENT RAD THERAPY FIELDS: CPT | Mod: 26,,, | Performed by: SPECIALIST

## 2025-06-02 PROCEDURE — 77385 HC IMRT, SIMPLE: CPT | Performed by: SPECIALIST

## 2025-06-03 PROCEDURE — 77385 HC IMRT, SIMPLE: CPT | Performed by: SPECIALIST

## 2025-06-03 PROCEDURE — 77014 PR  CT GUIDANCE PLACEMENT RAD THERAPY FIELDS: CPT | Mod: 26,,, | Performed by: SPECIALIST

## 2025-06-04 PROCEDURE — 77014 PR  CT GUIDANCE PLACEMENT RAD THERAPY FIELDS: CPT | Mod: 26,,, | Performed by: SPECIALIST

## 2025-06-04 PROCEDURE — 77385 HC IMRT, SIMPLE: CPT | Performed by: SPECIALIST

## 2025-06-05 DIAGNOSIS — C61 PROSTATE CANCER: Primary | ICD-10-CM

## 2025-06-05 RX ORDER — TAMSULOSIN HYDROCHLORIDE 0.4 MG/1
0.4 CAPSULE ORAL 2 TIMES DAILY
Qty: 60 CAPSULE | Refills: 11 | Status: SHIPPED | OUTPATIENT
Start: 2025-06-05 | End: 2026-06-05

## 2025-06-09 ENCOUNTER — DOCUMENTATION ONLY (OUTPATIENT)
Dept: RADIATION ONCOLOGY | Facility: CLINIC | Age: 65
End: 2025-06-09
Payer: MEDICARE

## 2025-06-09 NOTE — PLAN OF CARE
Day 21 of xrt to the prostate. Patient c/o burning with urination.  is aware. Will continue to monitor.

## 2025-06-12 DIAGNOSIS — C61 PROSTATE CANCER: Primary | ICD-10-CM

## 2025-06-13 DIAGNOSIS — N34.2 INFECTIVE URETHRITIS: Primary | ICD-10-CM

## 2025-06-13 RX ORDER — SULFAMETHOXAZOLE AND TRIMETHOPRIM 800; 160 MG/1; MG/1
1 TABLET ORAL 2 TIMES DAILY
Qty: 14 TABLET | Refills: 0 | Status: SHIPPED | OUTPATIENT
Start: 2025-06-13

## 2025-06-16 ENCOUNTER — DOCUMENTATION ONLY (OUTPATIENT)
Dept: RADIATION ONCOLOGY | Facility: CLINIC | Age: 65
End: 2025-06-16
Payer: MEDICARE

## 2025-06-16 NOTE — PLAN OF CARE
Day 26 of xrt to the prostate. Patient tolerating treatment well and has no concerns at this time.

## 2025-07-05 ENCOUNTER — HOSPITAL ENCOUNTER (INPATIENT)
Facility: HOSPITAL | Age: 65
LOS: 4 days | Discharge: HOME HEALTH | DRG: 872 | End: 2025-07-09
Attending: EMERGENCY MEDICINE | Admitting: INTERNAL MEDICINE
Payer: MEDICARE

## 2025-07-05 DIAGNOSIS — R33.9 URINE RETENTION: ICD-10-CM

## 2025-07-05 DIAGNOSIS — R53.1 WEAKNESS: ICD-10-CM

## 2025-07-05 DIAGNOSIS — I95.9 HYPOTENSION: ICD-10-CM

## 2025-07-05 DIAGNOSIS — N39.0 COMPLICATED URINARY TRACT INFECTION: ICD-10-CM

## 2025-07-05 DIAGNOSIS — R07.9 CHEST PAIN: ICD-10-CM

## 2025-07-05 DIAGNOSIS — A41.9 SEPSIS: ICD-10-CM

## 2025-07-05 DIAGNOSIS — C61 PROSTATE CANCER: Primary | ICD-10-CM

## 2025-07-05 DIAGNOSIS — N39.0 COMPLICATED UTI (URINARY TRACT INFECTION): ICD-10-CM

## 2025-07-05 PROBLEM — I50.22 CHRONIC SYSTOLIC (CONGESTIVE) HEART FAILURE: Chronic | Status: ACTIVE | Noted: 2025-07-05

## 2025-07-05 PROBLEM — G47.33 OSA (OBSTRUCTIVE SLEEP APNEA): Chronic | Status: ACTIVE | Noted: 2025-07-05

## 2025-07-05 PROBLEM — E11.9 TYPE 2 DIABETES MELLITUS: Chronic | Status: ACTIVE | Noted: 2025-07-05

## 2025-07-05 PROBLEM — E87.6 HYPOKALEMIA: Status: ACTIVE | Noted: 2025-07-05

## 2025-07-05 LAB
ABSOLUTE EOSINOPHIL (OHS): 0 K/UL
ABSOLUTE MONOCYTE (OHS): 0.4 K/UL (ref 0.3–1)
ABSOLUTE NEUTROPHIL COUNT (OHS): 3.46 K/UL (ref 1.8–7.7)
ALBUMIN SERPL BCP-MCNC: 3.6 G/DL (ref 3.5–5.2)
ALP SERPL-CCNC: 61 UNIT/L (ref 40–150)
ALT SERPL W/O P-5'-P-CCNC: 58 UNIT/L (ref 10–44)
ANION GAP (OHS): 17 MMOL/L (ref 8–16)
AST SERPL-CCNC: 43 UNIT/L (ref 11–45)
BACTERIA #/AREA URNS AUTO: ABNORMAL /HPF
BASOPHILS # BLD AUTO: 0.01 K/UL
BASOPHILS NFR BLD AUTO: 0.2 %
BILIRUB SERPL-MCNC: 0.8 MG/DL (ref 0.1–1)
BILIRUB UR QL STRIP.AUTO: NEGATIVE
BNP SERPL-MCNC: 85 PG/ML (ref 0–99)
BUN SERPL-MCNC: 18 MG/DL (ref 8–23)
CALCIUM SERPL-MCNC: 9 MG/DL (ref 8.7–10.5)
CHLORIDE SERPL-SCNC: 104 MMOL/L (ref 95–110)
CLARITY UR: ABNORMAL
CO2 SERPL-SCNC: 18 MMOL/L (ref 23–29)
COLOR UR AUTO: YELLOW
CREAT SERPL-MCNC: 1.2 MG/DL (ref 0.5–1.4)
ERYTHROCYTE [DISTWIDTH] IN BLOOD BY AUTOMATED COUNT: 16.7 % (ref 11.5–14.5)
GFR SERPLBLD CREATININE-BSD FMLA CKD-EPI: >60 ML/MIN/1.73/M2
GLUCOSE SERPL-MCNC: 120 MG/DL (ref 70–110)
GLUCOSE UR QL STRIP: ABNORMAL
HCT VFR BLD AUTO: 35.6 % (ref 40–54)
HGB BLD-MCNC: 11.5 GM/DL (ref 14–18)
HGB UR QL STRIP: ABNORMAL
HYALINE CASTS UR QL AUTO: 0 /LPF (ref 0–1)
IMM GRANULOCYTES # BLD AUTO: 0.02 K/UL (ref 0–0.04)
IMM GRANULOCYTES NFR BLD AUTO: 0.5 % (ref 0–0.5)
KETONES UR QL STRIP: NEGATIVE
LACTATE SERPL-SCNC: 1.9 MMOL/L (ref 0.5–2.2)
LACTATE SERPL-SCNC: 2.3 MMOL/L (ref 0.5–2.2)
LEUKOCYTE ESTERASE UR QL STRIP: ABNORMAL
LYMPHOCYTES # BLD AUTO: 0.23 K/UL (ref 1–4.8)
MAGNESIUM SERPL-MCNC: 1.5 MG/DL (ref 1.6–2.6)
MCH RBC QN AUTO: 29.1 PG (ref 27–31)
MCHC RBC AUTO-ENTMCNC: 32.3 G/DL (ref 32–36)
MCV RBC AUTO: 90 FL (ref 82–98)
MICROSCOPIC COMMENT: ABNORMAL
NITRITE UR QL STRIP: NEGATIVE
NUCLEATED RBC (/100WBC) (OHS): 0 /100 WBC
PH UR STRIP: 6 [PH]
PLATELET # BLD AUTO: 118 K/UL (ref 150–450)
PMV BLD AUTO: 8.2 FL (ref 9.2–12.9)
POCT GLUCOSE: 101 MG/DL (ref 70–110)
POCT GLUCOSE: 121 MG/DL (ref 70–110)
POTASSIUM SERPL-SCNC: 2.9 MMOL/L (ref 3.5–5.1)
PROT SERPL-MCNC: 7 GM/DL (ref 6–8.4)
PROT UR QL STRIP: ABNORMAL
PSA SERPL-MCNC: 0.06 NG/ML
RBC # BLD AUTO: 3.95 M/UL (ref 4.6–6.2)
RBC #/AREA URNS AUTO: >100 /HPF (ref 0–4)
RELATIVE EOSINOPHIL (OHS): 0 %
RELATIVE LYMPHOCYTE (OHS): 5.6 % (ref 18–48)
RELATIVE MONOCYTE (OHS): 9.7 % (ref 4–15)
RELATIVE NEUTROPHIL (OHS): 84 % (ref 38–73)
SODIUM SERPL-SCNC: 139 MMOL/L (ref 136–145)
SP GR UR STRIP: 1.01
UROBILINOGEN UR STRIP-ACNC: NEGATIVE EU/DL
WBC # BLD AUTO: 4.12 K/UL (ref 3.9–12.7)
WBC #/AREA URNS AUTO: >100 /HPF (ref 0–5)
WBC CLUMPS UR QL AUTO: ABNORMAL
YEAST UR QL AUTO: ABNORMAL /HPF

## 2025-07-05 PROCEDURE — 83735 ASSAY OF MAGNESIUM: CPT | Performed by: INTERNAL MEDICINE

## 2025-07-05 PROCEDURE — 84153 ASSAY OF PSA TOTAL: CPT | Performed by: EMERGENCY MEDICINE

## 2025-07-05 PROCEDURE — 87040 BLOOD CULTURE FOR BACTERIA: CPT | Performed by: INTERNAL MEDICINE

## 2025-07-05 PROCEDURE — 93010 ELECTROCARDIOGRAM REPORT: CPT | Mod: ,,, | Performed by: INTERNAL MEDICINE

## 2025-07-05 PROCEDURE — 96375 TX/PRO/DX INJ NEW DRUG ADDON: CPT

## 2025-07-05 PROCEDURE — 84075 ASSAY ALKALINE PHOSPHATASE: CPT | Performed by: EMERGENCY MEDICINE

## 2025-07-05 PROCEDURE — 85025 COMPLETE CBC W/AUTO DIFF WBC: CPT | Performed by: EMERGENCY MEDICINE

## 2025-07-05 PROCEDURE — 83605 ASSAY OF LACTIC ACID: CPT | Performed by: EMERGENCY MEDICINE

## 2025-07-05 PROCEDURE — 93005 ELECTROCARDIOGRAM TRACING: CPT

## 2025-07-05 PROCEDURE — 63600175 PHARM REV CODE 636 W HCPCS: Performed by: INTERNAL MEDICINE

## 2025-07-05 PROCEDURE — 99285 EMERGENCY DEPT VISIT HI MDM: CPT | Mod: 25

## 2025-07-05 PROCEDURE — 51702 INSERT TEMP BLADDER CATH: CPT

## 2025-07-05 PROCEDURE — 21400001 HC TELEMETRY ROOM

## 2025-07-05 PROCEDURE — 25000003 PHARM REV CODE 250: Performed by: INTERNAL MEDICINE

## 2025-07-05 PROCEDURE — 96374 THER/PROPH/DIAG INJ IV PUSH: CPT | Mod: 59

## 2025-07-05 PROCEDURE — 87088 URINE BACTERIA CULTURE: CPT | Performed by: EMERGENCY MEDICINE

## 2025-07-05 PROCEDURE — 81001 URINALYSIS AUTO W/SCOPE: CPT | Performed by: EMERGENCY MEDICINE

## 2025-07-05 PROCEDURE — 63600175 PHARM REV CODE 636 W HCPCS: Performed by: EMERGENCY MEDICINE

## 2025-07-05 PROCEDURE — 83880 ASSAY OF NATRIURETIC PEPTIDE: CPT | Performed by: EMERGENCY MEDICINE

## 2025-07-05 PROCEDURE — 25000003 PHARM REV CODE 250: Performed by: EMERGENCY MEDICINE

## 2025-07-05 RX ORDER — IBUPROFEN 200 MG
16 TABLET ORAL
Status: DISCONTINUED | OUTPATIENT
Start: 2025-07-05 | End: 2025-07-09 | Stop reason: HOSPADM

## 2025-07-05 RX ORDER — ONDANSETRON HYDROCHLORIDE 2 MG/ML
4 INJECTION, SOLUTION INTRAVENOUS
Status: COMPLETED | OUTPATIENT
Start: 2025-07-05 | End: 2025-07-05

## 2025-07-05 RX ORDER — MORPHINE SULFATE 4 MG/ML
4 INJECTION, SOLUTION INTRAMUSCULAR; INTRAVENOUS
Refills: 0 | Status: COMPLETED | OUTPATIENT
Start: 2025-07-05 | End: 2025-07-05

## 2025-07-05 RX ORDER — CLOPIDOGREL BISULFATE 75 MG/1
75 TABLET ORAL DAILY
Status: DISCONTINUED | OUTPATIENT
Start: 2025-07-06 | End: 2025-07-09 | Stop reason: HOSPADM

## 2025-07-05 RX ORDER — PROCHLORPERAZINE EDISYLATE 5 MG/ML
5 INJECTION INTRAMUSCULAR; INTRAVENOUS EVERY 6 HOURS PRN
Status: DISCONTINUED | OUTPATIENT
Start: 2025-07-05 | End: 2025-07-09 | Stop reason: HOSPADM

## 2025-07-05 RX ORDER — SODIUM CHLORIDE 9 MG/ML
1000 INJECTION, SOLUTION INTRAVENOUS
Status: COMPLETED | OUTPATIENT
Start: 2025-07-05 | End: 2025-07-05

## 2025-07-05 RX ORDER — NALOXONE HCL 0.4 MG/ML
0.02 VIAL (ML) INJECTION
Status: DISCONTINUED | OUTPATIENT
Start: 2025-07-05 | End: 2025-07-09 | Stop reason: HOSPADM

## 2025-07-05 RX ORDER — MEROPENEM 1 G/1
1 INJECTION, POWDER, FOR SOLUTION INTRAVENOUS
Status: DISCONTINUED | OUTPATIENT
Start: 2025-07-05 | End: 2025-07-08

## 2025-07-05 RX ORDER — HYDROCODONE BITARTRATE AND ACETAMINOPHEN 5; 325 MG/1; MG/1
1 TABLET ORAL EVERY 6 HOURS PRN
Refills: 0 | Status: DISCONTINUED | OUTPATIENT
Start: 2025-07-05 | End: 2025-07-09 | Stop reason: HOSPADM

## 2025-07-05 RX ORDER — ENOXAPARIN SODIUM 100 MG/ML
40 INJECTION SUBCUTANEOUS EVERY 24 HOURS
Status: DISCONTINUED | OUTPATIENT
Start: 2025-07-05 | End: 2025-07-09 | Stop reason: HOSPADM

## 2025-07-05 RX ORDER — ONDANSETRON HYDROCHLORIDE 2 MG/ML
4 INJECTION, SOLUTION INTRAVENOUS EVERY 8 HOURS PRN
Status: DISCONTINUED | OUTPATIENT
Start: 2025-07-05 | End: 2025-07-09 | Stop reason: HOSPADM

## 2025-07-05 RX ORDER — POTASSIUM CHLORIDE 20 MEQ/1
40 TABLET, EXTENDED RELEASE ORAL ONCE
Status: COMPLETED | OUTPATIENT
Start: 2025-07-05 | End: 2025-07-05

## 2025-07-05 RX ORDER — LIDOCAINE HYDROCHLORIDE 20 MG/ML
JELLY TOPICAL
Status: COMPLETED | OUTPATIENT
Start: 2025-07-05 | End: 2025-07-05

## 2025-07-05 RX ORDER — BUSPIRONE HYDROCHLORIDE 5 MG/1
5 TABLET ORAL 2 TIMES DAILY PRN
Status: DISCONTINUED | OUTPATIENT
Start: 2025-07-05 | End: 2025-07-09 | Stop reason: HOSPADM

## 2025-07-05 RX ORDER — MAGNESIUM SULFATE HEPTAHYDRATE 40 MG/ML
2 INJECTION, SOLUTION INTRAVENOUS ONCE
Status: COMPLETED | OUTPATIENT
Start: 2025-07-05 | End: 2025-07-05

## 2025-07-05 RX ORDER — MUPIROCIN 20 MG/G
OINTMENT TOPICAL 2 TIMES DAILY
Status: DISCONTINUED | OUTPATIENT
Start: 2025-07-05 | End: 2025-07-09 | Stop reason: HOSPADM

## 2025-07-05 RX ORDER — SODIUM CHLORIDE 9 MG/ML
INJECTION, SOLUTION INTRAVENOUS CONTINUOUS
Status: ACTIVE | OUTPATIENT
Start: 2025-07-05 | End: 2025-07-06

## 2025-07-05 RX ORDER — SODIUM CHLORIDE 0.9 % (FLUSH) 0.9 %
10 SYRINGE (ML) INJECTION EVERY 12 HOURS PRN
Status: DISCONTINUED | OUTPATIENT
Start: 2025-07-05 | End: 2025-07-09 | Stop reason: HOSPADM

## 2025-07-05 RX ORDER — TALC
6 POWDER (GRAM) TOPICAL NIGHTLY PRN
Status: DISCONTINUED | OUTPATIENT
Start: 2025-07-05 | End: 2025-07-09 | Stop reason: HOSPADM

## 2025-07-05 RX ORDER — INSULIN ASPART 100 [IU]/ML
0-10 INJECTION, SOLUTION INTRAVENOUS; SUBCUTANEOUS
Status: DISCONTINUED | OUTPATIENT
Start: 2025-07-05 | End: 2025-07-09 | Stop reason: HOSPADM

## 2025-07-05 RX ORDER — ACETAMINOPHEN 325 MG/1
650 TABLET ORAL EVERY 4 HOURS PRN
Status: DISCONTINUED | OUTPATIENT
Start: 2025-07-05 | End: 2025-07-09 | Stop reason: HOSPADM

## 2025-07-05 RX ORDER — SODIUM CHLORIDE 9 MG/ML
1000 INJECTION, SOLUTION INTRAVENOUS
Status: DISCONTINUED | OUTPATIENT
Start: 2025-07-05 | End: 2025-07-05

## 2025-07-05 RX ORDER — GLUCAGON 1 MG
1 KIT INJECTION
Status: DISCONTINUED | OUTPATIENT
Start: 2025-07-05 | End: 2025-07-09 | Stop reason: HOSPADM

## 2025-07-05 RX ORDER — TAMSULOSIN HYDROCHLORIDE 0.4 MG/1
0.4 CAPSULE ORAL NIGHTLY
Status: DISCONTINUED | OUTPATIENT
Start: 2025-07-06 | End: 2025-07-09 | Stop reason: HOSPADM

## 2025-07-05 RX ORDER — IBUPROFEN 200 MG
24 TABLET ORAL
Status: DISCONTINUED | OUTPATIENT
Start: 2025-07-05 | End: 2025-07-09 | Stop reason: HOSPADM

## 2025-07-05 RX ADMIN — POTASSIUM CHLORIDE 40 MEQ: 1500 TABLET, EXTENDED RELEASE ORAL at 04:07

## 2025-07-05 RX ADMIN — Medication 6 MG: at 08:07

## 2025-07-05 RX ADMIN — ENOXAPARIN SODIUM 40 MG: 40 INJECTION SUBCUTANEOUS at 04:07

## 2025-07-05 RX ADMIN — MEROPENEM 1 G: 1 INJECTION INTRAVENOUS at 11:07

## 2025-07-05 RX ADMIN — LIDOCAINE HYDROCHLORIDE: 20 JELLY TOPICAL at 01:07

## 2025-07-05 RX ADMIN — POTASSIUM BICARBONATE 20 MEQ: 391 TABLET, EFFERVESCENT ORAL at 02:07

## 2025-07-05 RX ADMIN — SODIUM CHLORIDE: 9 INJECTION, SOLUTION INTRAVENOUS at 05:07

## 2025-07-05 RX ADMIN — SODIUM CHLORIDE 1000 ML: 0.9 INJECTION, SOLUTION INTRAVENOUS at 01:07

## 2025-07-05 RX ADMIN — ONDANSETRON 4 MG: 2 INJECTION INTRAMUSCULAR; INTRAVENOUS at 12:07

## 2025-07-05 RX ADMIN — MUPIROCIN: 20 OINTMENT TOPICAL at 08:07

## 2025-07-05 RX ADMIN — MORPHINE SULFATE 4 MG: 4 INJECTION INTRAVENOUS at 12:07

## 2025-07-05 RX ADMIN — MAGNESIUM SULFATE HEPTAHYDRATE 2 G: 40 INJECTION, SOLUTION INTRAVENOUS at 08:07

## 2025-07-05 RX ADMIN — MEROPENEM 1 G: 1 INJECTION INTRAVENOUS at 03:07

## 2025-07-05 NOTE — PLAN OF CARE
Pt remains free from falls/injuries this shift. Safety precautions maintained. Pt denies any discomfort or pain at this time. Tele monitoring per orders (Box # [id number]). No s/s of acute distress noted. Will continue to monitor. Chart check completed.

## 2025-07-05 NOTE — ED NOTES
Unable to catheterize patient at this time. 2 attempts with coude catheters unsuccessful. MD Franki notified.

## 2025-07-05 NOTE — ASSESSMENT & PLAN NOTE
Patient's most recent potassium results are listed below.   Recent Labs     07/05/25  1346   K 2.9*     Plan  - Replete potassium per protocol  - Monitor potassium Every 12 hours  - Patient's hypokalemia is undergoing treatment  - check Mg level

## 2025-07-05 NOTE — Clinical Note
Diagnosis: Complicated urinary tract infection [261124]   Reason for IP Medical Treatment  (Clinical interventions that can only be accomplished in the IP setting? ) :: treatment with IV antibiotics to treat ESBL infection

## 2025-07-05 NOTE — ASSESSMENT & PLAN NOTE
"Patient's FSGs are controlled on current medication regimen.  Last A1c reviewed-   Lab Results   Component Value Date    HGBA1C 6.3 07/11/2024     Most recent fingerstick glucose reviewed- No results for input(s): "POCTGLUCOSE" in the last 24 hours.  Current correctional scale  Medium  Maintain anti-hyperglycemic dose as follows-   Antihyperglycemics (From admission, onward)      Start     Stop Route Frequency Ordered    07/05/25 1630  insulin aspart U-100 pen 0-10 Units         -- SubQ Before meals & nightly PRN 07/05/25 1531        Start mod dose SSI- uptitrate pending trends   Repeat A1c with AM labs   Monitor BG; hypoglycemia protocol   Hold Oral hypoglycemics while patient is in the hospital.  "

## 2025-07-05 NOTE — H&P
[facility] - Emergency Dept.  [facility]  History & Physical    Patient Name: Hector Smith  MRN: [id number]  Patient Class: IP- Inpatient  Admission Date: 7/5/2025  Attending Physician: Jordan Doan Jr., MD   Primary Care Provider: Ochoa Lane MD         Patient information was obtained from patient, spouse/SO, past medical records, and ER records.     Subjective:     Principal Problem:Complicated UTI (urinary tract infection)    Chief Complaint:   Chief Complaint   Patient presents with    Urinary Retention     Pt states he has not been able to pee since yesterday. Pt states he has prostate cancer and is receiving treatment. Pts wife tried to catheterized him twice today but was unable to get anything out.         HPI: Patient is a 64-year-old  male with past medical history notable for chronic systolic heart failure (EF 20-25%) status post AICD, type 2 diabetes mellitus, essential hypertension, carotid disease, prostate cancer (on treatment) who presents to the ED on 07/05/2025 for evaluation of difficulty urinating.  Patient reports that he has been having increased pain with urination for the last 3-4 days and over the last 2 days has not been able to urinate.  His wife attempted to straight cath him at home with no success.  He reports lower abdominal pressure/discomfort as well as a few drops of blood at the end of history when he was able to urinate previously.  Also reports 2 day history of watery diarrhea, up to 3 stools per day.  Denies chest pain, shortness of breath, nausea, vomiting, decreased p.o. intake, fevers, chills.  In the ED, initial vital signs:  Patient afebrile, heart rate 101, respiratory rate 16, blood pressure 181/93, sats 99% on room air.  Straight cath x2 was attempted in the ED but was unsuccessful, Urology was consulted and recommended a 20 Dominican coude which was placed.  Shortly after catheter placement patient became diaphoretic and hypotensive-  received 1L NS bolus.  Initial workup:  CBC without leukocytosis, hemoglobin 11.5, platelets 118, potassium 2.9, bicarb 18, anion gap 17, creatinine 1.2, BNP within normal limits.  Lactate 2.3.  UA consistent with UTI.  He received p.o. potassium 20 mEq, IV meropenem based on previous cultures. Hospital medicine consulted for admission    Past Medical History:   Diagnosis Date    Diabetes mellitus, type 2     Hyperlipidemia     Hypertension        Past Surgical History:   Procedure Laterality Date    SPINAL FUSION N/A 2019    TRANSURETHRAL RESECTION OF PROSTATE N/A 1/6/2025    Procedure: TURP (TRANSURETHRAL RESECTION OF PROSTATE);  Surgeon: Ron Lassiter MD;  Location: [facility];  Service: Urology;  Laterality: N/A;       Review of patient's allergies indicates:   Allergen Reactions    Adhesive Rash     Silk Tape    Suture, silk Rash       No current facility-administered medications on file prior to encounter.     Current Outpatient Medications on File Prior to Encounter   Medication Sig    amLODIPine (NORVASC) 5 MG tablet Take 5 mg by mouth once daily.    atorvastatin (LIPITOR) 20 MG tablet Take 20 mg by mouth once daily.    busPIRone (BUSPAR) 5 MG Tab Take 5 mg by mouth 2 (two) times daily.    clopidogreL (PLAVIX) 75 mg tablet Take 75 mg by mouth once daily.    colchicine (COLCRYS) 0.6 mg tablet Take 0.6 mg by mouth daily as needed.    dapagliflozin propanediol (FARXIGA) 10 mg tablet Take 10 mg by mouth.    darolutamide 300 mg Tab Take 2 tablets (600 mg) by mouth 2 (two) times a day.    ENTRESTO 24-26 mg per tablet Take 1 tablet by mouth 2 (two) times daily.    furosemide (LASIX) 40 MG tablet Take 40 mg by mouth once daily.    gabapentin (NEURONTIN) 300 MG capsule     JANUMET  mg per tablet Take 1 tablet by mouth 2 (two) times daily.    ketoconazole (NIZORAL) 2 % cream Apply topically 2 (two) times daily.    metoprolol succinate (TOPROL-XL) 100 MG 24 hr tablet Take 100 mg by mouth once daily.     sildenafiL (VIAGRA) 100 MG tablet Take 1 tablet (100 mg total) by mouth daily as needed for Erectile Dysfunction.    tamsulosin (FLOMAX) 0.4 mg Cap Take 1 capsule (0.4 mg total) by mouth 2 (two) times a day.    zolpidem (AMBIEN) 5 MG Tab Take 5 mg by mouth.    [DISCONTINUED] nitrofurantoin, macrocrystal-monohydrate, (MACROBID) 100 MG capsule Take 1 capsule (100 mg total) by mouth 2 (two) times daily.    [DISCONTINUED] sulfamethoxazole-trimethoprim 800-160mg (BACTRIM DS) 800-160 mg Tab Take 1 tablet by mouth 2 (two) times daily.     Family History       Problem Relation (Age of Onset)    Colon cancer Mother    Throat cancer Sister          Tobacco Use    Smoking status: Former     Current packs/day: 0.00     Types: Cigarettes     Quit date:      Years since quittin.5     Passive exposure: Never    Smokeless tobacco: Never   Substance and Sexual Activity    Alcohol use: Yes     Alcohol/week: 1.0 standard drink of alcohol     Types: 1 Cans of beer per week     Comment: About 1 drink a week socially    Drug use: Never    Sexual activity: Not Currently     Review of Systems   Constitutional:  Positive for fatigue. Negative for activity change, appetite change, chills and fever.   HENT:  Negative for congestion, rhinorrhea and sore throat.    Respiratory:  Negative for cough, chest tightness, shortness of breath and wheezing.    Cardiovascular:  Negative for chest pain, palpitations and leg swelling.   Gastrointestinal:  Positive for abdominal pain and diarrhea. Negative for blood in stool, constipation, nausea and vomiting.   Genitourinary:  Positive for decreased urine volume, difficulty urinating, dysuria and hematuria.   Musculoskeletal:  Negative for gait problem.   Skin:  Negative for rash and wound.   Neurological:  Negative for dizziness, light-headedness, numbness and headaches.     Objective:     Vital Signs (Most Recent):  Temp: 98.3 °F (36.8 °C) (25 1215)  Pulse: 81 (25 1526)  Resp: 20  (07/05/25 1526)  BP: (!) 105/55 (07/05/25 1526)  SpO2: 100 % (07/05/25 1526) Vital Signs (24h Range):  Temp:  [98.3 °F (36.8 °C)] 98.3 °F (36.8 °C)  Pulse:  [] 81  Resp:  [13-21] 20  SpO2:  [81 %-100 %] 100 %  BP: ()/(50-93) 105/55     Weight: 80.3 kg (177 lb)  Body mass index is 32.37 kg/m².     Physical Exam  Vitals and nursing note reviewed.   Constitutional:       General: He is not in acute distress.     Appearance: He is ill-appearing.   HENT:      Head: Normocephalic and atraumatic.      Mouth/Throat:      Mouth: Mucous membranes are moist.      Pharynx: Oropharynx is clear.   Eyes:      General: No scleral icterus.     Extraocular Movements: Extraocular movements intact.      Conjunctiva/sclera: Conjunctivae normal.   Cardiovascular:      Rate and Rhythm: Normal rate and regular rhythm.      Heart sounds: No murmur heard.  Pulmonary:      Effort: Pulmonary effort is normal.      Breath sounds: Normal breath sounds. No wheezing or rales.      Comments: 2L NC O2, sats 100%   Abdominal:      General: Bowel sounds are normal. Distension: mild.      Palpations: Abdomen is soft.      Tenderness: There is abdominal tenderness (bilateral lower quadrant TTP). There is no guarding or rebound.   Genitourinary:     Comments: Rodrigues in place with dark clotilde urine   Musculoskeletal:      Right lower leg: No edema.      Left lower leg: No edema.   Neurological:      Mental Status: He is alert and oriented to person, place, and time. Mental status is at baseline.                Significant Labs: All pertinent labs within the past 24 hours have been reviewed.    Significant Imaging: I have reviewed all pertinent imaging results/findings within the past 24 hours.    Assessment/Plan:     Assessment & Plan  Complicated UTI (urinary tract infection)  - in setting of known BPH, prostate cancer   - s/p 20Fr Coude placement in the ED   - Continue IV Merrem (has hx of ESBL E. Coli)   - urine culture pending   - Anticipate  "discharge with batres catheter in place- per urology Dr. Valdez- he will set pt up to see Dr. Lassiter in clinic on discharge     Sepsis  Patient has sepsis without organ dysfunction secondary to Urinary Tract Infection. A review of systems was completed. Patient's sepsis is undergoing treatment     Current Antibiotics    meropenem injection 1 g, Every 8 hours (non-standard times), Intravenous    Lactate  Recent Labs   Lab 07/05/25  1446   LACTATE 2.3*     Culture Data  Blood Cultures No results found for: "CULTBLD"   Urine Culture   Urine Culture   Date Value Ref Range Status   06/12/2025 >100,000 cfu/ml Escherichia coli ESBL (A)  Final     Urine Culture, Routine   Date Value Ref Range Status   02/22/2025 ESCHERICHIA COLI ESBL  >100,000 cfu/ml   (A)  Final      mSOFA  MSOFA Total  Min: 0   Min taken time: 07/05/25 1400  Max: 1   Max taken time: 07/05/25 1601    Plan  - Antibiotics as listed above  - Fluid resuscitation as follows:received 1L NS bolus in ED (limited due to hx of CHF)   - Continue NS at 100/hr for 10 hours   - Trend lactate to resolution  - Follow up culture data  - Vasopressors were not needed  - rule out Cdif given reported diarrhea        Hypokalemia  Patient's most recent potassium results are listed below.   Recent Labs     07/05/25  1346   K 2.9*     Plan  - Replete potassium per protocol  - Monitor potassium Every 12 hours  - Patient's hypokalemia is undergoing treatment  - check Mg level   Prostate cancer  - followed by Urology Dr. Lassiter and Rad Onc as outapteint   - Currently on Darolutamide  - Hold oral chemo for now in setting of UTI     Chronic systolic (congestive) heart failure  Patient has Systolic (HFrEF) heart failure that is Chronic. On presentation their CHF was well compensated. Most recent BNP and echo results are listed below.  Recent Labs     07/05/25  1509   BNP 85     Latest ECHO  No results found for this or any previous visit.    Current Heart Failure Medications   " "    Plan  - Monitor strict I&Os and daily weights.    - Place on telemetry  - Low sodium diet  - Place on fluid restriction of 1.5 L.   - Cardiology has not been consulted  - The patient's volume status is at their baseline  - Last known EF 20-25% in 2018, s/p AICD, followed by Cardiology Dr. Dyer as outpatient   - hold home entresto and lasix in setting of sepsis and hypotension       Type 2 diabetes mellitus  Patient's FSGs are controlled on current medication regimen.  Last A1c reviewed-   Lab Results   Component Value Date    HGBA1C 6.3 07/11/2024     Most recent fingerstick glucose reviewed- No results for input(s): "POCTGLUCOSE" in the last 24 hours.  Current correctional scale  Medium  Maintain anti-hyperglycemic dose as follows-   Antihyperglycemics (From admission, onward)      Start     Stop Route Frequency Ordered    07/05/25 1630  insulin aspart U-100 pen 0-10 Units         -- SubQ Before meals & nightly PRN 07/05/25 1531        Start mod dose SSI- uptitrate pending trends   Repeat A1c with AM labs   Monitor BG; hypoglycemia protocol   Hold Oral hypoglycemics while patient is in the hospital.  PERNELL (obstructive sleep apnea)  Not on CPAP   Wean Supplemental O2- maintain sats > 90%     VTE Risk Mitigation (From admission, onward)           Ordered     enoxaparin injection 40 mg  Daily         07/05/25 1531     IP VTE HIGH RISK PATIENT  Once         07/05/25 1531     Place sequential compression device  Until discontinued         07/05/25 1531                                                Viktoriya Luis MD  Department of Hospital Medicine  [facility] - Emergency Dept.          "

## 2025-07-05 NOTE — SUBJECTIVE & OBJECTIVE
Past Medical History:   Diagnosis Date    Diabetes mellitus, type 2     Hyperlipidemia     Hypertension        Past Surgical History:   Procedure Laterality Date    SPINAL FUSION N/A 2019    TRANSURETHRAL RESECTION OF PROSTATE N/A 1/6/2025    Procedure: TURP (TRANSURETHRAL RESECTION OF PROSTATE);  Surgeon: Ron Lassiter MD;  Location: [facility];  Service: Urology;  Laterality: N/A;       Review of patient's allergies indicates:   Allergen Reactions    Adhesive Rash     Silk Tape    Suture, silk Rash       No current facility-administered medications on file prior to encounter.     Current Outpatient Medications on File Prior to Encounter   Medication Sig    amLODIPine (NORVASC) 5 MG tablet Take 5 mg by mouth once daily.    atorvastatin (LIPITOR) 20 MG tablet Take 20 mg by mouth once daily.    busPIRone (BUSPAR) 5 MG Tab Take 5 mg by mouth 2 (two) times daily.    clopidogreL (PLAVIX) 75 mg tablet Take 75 mg by mouth once daily.    colchicine (COLCRYS) 0.6 mg tablet Take 0.6 mg by mouth daily as needed.    dapagliflozin propanediol (FARXIGA) 10 mg tablet Take 10 mg by mouth.    darolutamide 300 mg Tab Take 2 tablets (600 mg) by mouth 2 (two) times a day.    ENTRESTO 24-26 mg per tablet Take 1 tablet by mouth 2 (two) times daily.    furosemide (LASIX) 40 MG tablet Take 40 mg by mouth once daily.    gabapentin (NEURONTIN) 300 MG capsule     JANUMET  mg per tablet Take 1 tablet by mouth 2 (two) times daily.    ketoconazole (NIZORAL) 2 % cream Apply topically 2 (two) times daily.    metoprolol succinate (TOPROL-XL) 100 MG 24 hr tablet Take 100 mg by mouth once daily.    sildenafiL (VIAGRA) 100 MG tablet Take 1 tablet (100 mg total) by mouth daily as needed for Erectile Dysfunction.    tamsulosin (FLOMAX) 0.4 mg Cap Take 1 capsule (0.4 mg total) by mouth 2 (two) times a day.    zolpidem (AMBIEN) 5 MG Tab Take 5 mg by mouth.    [DISCONTINUED] nitrofurantoin, macrocrystal-monohydrate, (MACROBID) 100 MG capsule  Take 1 capsule (100 mg total) by mouth 2 (two) times daily.    [DISCONTINUED] sulfamethoxazole-trimethoprim 800-160mg (BACTRIM DS) 800-160 mg Tab Take 1 tablet by mouth 2 (two) times daily.     Family History       Problem Relation (Age of Onset)    Colon cancer Mother    Throat cancer Sister          Tobacco Use    Smoking status: Former     Current packs/day: 0.00     Types: Cigarettes     Quit date:      Years since quittin.5     Passive exposure: Never    Smokeless tobacco: Never   Substance and Sexual Activity    Alcohol use: Yes     Alcohol/week: 1.0 standard drink of alcohol     Types: 1 Cans of beer per week     Comment: About 1 drink a week socially    Drug use: Never    Sexual activity: Not Currently     Review of Systems   Constitutional:  Positive for fatigue. Negative for activity change, appetite change, chills and fever.   HENT:  Negative for congestion, rhinorrhea and sore throat.    Respiratory:  Negative for cough, chest tightness, shortness of breath and wheezing.    Cardiovascular:  Negative for chest pain, palpitations and leg swelling.   Gastrointestinal:  Positive for abdominal pain and diarrhea. Negative for blood in stool, constipation, nausea and vomiting.   Genitourinary:  Positive for decreased urine volume, difficulty urinating, dysuria and hematuria.   Musculoskeletal:  Negative for gait problem.   Skin:  Negative for rash and wound.   Neurological:  Negative for dizziness, light-headedness, numbness and headaches.     Objective:     Vital Signs (Most Recent):  Temp: 98.3 °F (36.8 °C) (25 1215)  Pulse: 81 (25 1526)  Resp: 20 (25 1526)  BP: (!) 105/55 (25 1526)  SpO2: 100 % (25 1526) Vital Signs (24h Range):  Temp:  [98.3 °F (36.8 °C)] 98.3 °F (36.8 °C)  Pulse:  [] 81  Resp:  [13-21] 20  SpO2:  [81 %-100 %] 100 %  BP: ()/(50-93) 105/55     Weight: 80.3 kg (177 lb)  Body mass index is 32.37 kg/m².     Physical Exam  Vitals and nursing  note reviewed.   Constitutional:       General: He is not in acute distress.     Appearance: He is ill-appearing.   HENT:      Head: Normocephalic and atraumatic.      Mouth/Throat:      Mouth: Mucous membranes are moist.      Pharynx: Oropharynx is clear.   Eyes:      General: No scleral icterus.     Extraocular Movements: Extraocular movements intact.      Conjunctiva/sclera: Conjunctivae normal.   Cardiovascular:      Rate and Rhythm: Normal rate and regular rhythm.      Heart sounds: No murmur heard.  Pulmonary:      Effort: Pulmonary effort is normal.      Breath sounds: Normal breath sounds. No wheezing or rales.      Comments: 2L NC O2, sats 100%   Abdominal:      General: Bowel sounds are normal. Distension: mild.      Palpations: Abdomen is soft.      Tenderness: There is abdominal tenderness (bilateral lower quadrant TTP). There is no guarding or rebound.   Genitourinary:     Comments: Rodrigues in place with dark clotilde urine   Musculoskeletal:      Right lower leg: No edema.      Left lower leg: No edema.   Neurological:      Mental Status: He is alert and oriented to person, place, and time. Mental status is at baseline.                Significant Labs: All pertinent labs within the past 24 hours have been reviewed.    Significant Imaging: I have reviewed all pertinent imaging results/findings within the past 24 hours.

## 2025-07-05 NOTE — NURSING TRANSFER
Nursing Transfer Note      7/5/2025   6:20 PM    Nurse giving handoff: ELIEZER Cooper  Nurse receiving handoff: ELIEZER Pedro    Reason patient is being transferred: UTI    Transfer From: ED    Transfer via stretcher    Transfer Vital Signs:  Blood Pressure: 112/65    Heart Rate: 83  O2: 96  Temperature:99.5  Respirations:18    Telemetry: Tele box: [id number]  Order for Tele Monitor? Yes    Additional Lines: Rodrigues Catheter    Patient belongings transferred with patient: Yes    Chart send with patient: Yes    Notified: spouse    Upon arrival to floor: cardiac monitor applied, patient oriented to room, call bell in reach, and bed in lowest position   Dr. Martell,  Please see attached encounter for Alex seen for gross hematuria which we discussed tonight. For scheduling, please call wife (Laly) at [phone].  Thank you  Jerica Rao

## 2025-07-05 NOTE — ASSESSMENT & PLAN NOTE
- followed by Urology Dr. Lassiter and Rad Onc as outapteint   - Currently on Darolutamide  - Hold oral chemo for now in setting of UTI

## 2025-07-05 NOTE — ED PROVIDER NOTES
SCRIBE #1 NOTE: I, Vipul Hendrix, am scribing for, and in the presence of, Jordan Doan Jr., MD. I have scribed the entire note.       History     Chief Complaint   Patient presents with    Urinary Retention     Pt states he has not been able to pee since yesterday. Pt states he has prostate cancer and is receiving treatment. Pts wife tried to catheterized him twice today but was unable to get anything out.      Review of patient's allergies indicates:   Allergen Reactions    Adhesive Rash     Silk Tape    Suture, silk Rash         History of Present Illness     HPI    7/5/2025, 1:03 PM  History obtained from the patient and medical records      History of Present Illness: Hector Smith is a 64 y.o. 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 Pt reports that he has been unable to get any urine out since yesterday. Pt reports his wife tried to catheterize him 2x yesterday but was unable to get any urine out. Symptoms are constant and moderate in severity. No mitigating or exacerbating factors reported. Associated sxs include lower abdominal pressure, urinary urgency, and lower abdominal fullness. No further complaints or concerns at this time.       Arrival mode: Personal Transportation    PCP: Ochoa Lane MD        Past Medical History:  Past Medical History:   Diagnosis Date    Diabetes mellitus, type 2     Hyperlipidemia     Hypertension        Past Surgical History:  Past Surgical History:   Procedure Laterality Date    SPINAL FUSION N/A 2019    TRANSURETHRAL RESECTION OF PROSTATE N/A 1/6/2025    Procedure: TURP (TRANSURETHRAL RESECTION OF PROSTATE);  Surgeon: Ron Lassiter MD;  Location: [facility];  Service: Urology;  Laterality: N/A;         Family History:  Family History   Problem Relation  Name Age of Onset    Colon cancer Mother      Throat cancer Sister      Prostate cancer Neg Hx      Breast cancer Neg Hx         Social History:  Social History     Tobacco Use    Smoking status: Former     Current packs/day: 0.00     Types: Cigarettes     Quit date:      Years since quittin.5     Passive exposure: Never    Smokeless tobacco: Never   Substance and Sexual Activity    Alcohol use: Yes     Alcohol/week: 1.0 standard drink of alcohol     Types: 1 Cans of beer per week     Comment: About 1 drink a week socially    Drug use: Never    Sexual activity: Not Currently        Review of Systems     Review of Systems   Constitutional:  Negative for fever.   HENT:  Negative for sore throat.    Respiratory:  Negative for shortness of breath.    Cardiovascular:  Negative for chest pain.   Gastrointestinal:  Negative for nausea.   Genitourinary:  Positive for difficulty urinating (urinary retention) and urgency. Negative for dysuria.        (+) lower abdominal fullness and pressure   Musculoskeletal:  Negative for back pain.   Skin:  Negative for rash.   Neurological:  Negative for weakness.   Hematological:  Does not bruise/bleed easily.   All other systems reviewed and are negative.       Physical Exam     Initial Vitals [25 1215]   BP Pulse Resp Temp SpO2   (!) 181/93 101 16 98.3 °F (36.8 °C) 99 %      MAP       --          Physical Exam  Nursing Notes and Vital Signs Reviewed.  Constitutional: Patient is in moderate distress secondary to urinary retention. Well-developed and well-nourished.  Head: Atraumatic. Normocephalic.  Eyes: PERRL. EOM intact. Conjunctivae are not pale. No scleral icterus.  ENT: Mucous membranes are moist. Oropharynx is clear and symmetric.    Neck: Supple. Full ROM. No lymphadenopathy.  Cardiovascular: Regular rate. Regular rhythm. No murmurs, rubs, or gallops. Distal pulses are 2+ and symmetric.  Pulmonary/Chest: No respiratory distress. Clear to auscultation bilaterally.  No wheezing or rales.  Abdominal: Soft and non-distended. There is no tenderness.  No rebound, guarding, or rigidity. Good bowel sounds.   Genitourinary: No CVA tenderness.  Musculoskeletal: Moves all extremities. No obvious deformities. No edema. No calf tenderness.  Skin: Warm and dry.  Neurological:  Alert, awake, and appropriate.  Normal speech.  No acute focal neurological deficits are appreciated.  Psychiatric: Normal affect. Good eye contact. Appropriate in content.     ED Course   Procedures  ED Vital Signs:  Vitals:    07/05/25 1340 07/05/25 1345 07/05/25 1350 07/05/25 1400   BP: (!) 100/58 (!) 102/57 (!) 101/58 (!) 95/53   Pulse: 88 81 82 79   Resp: 20 13 20 (!) 21   Temp:       TempSrc:       SpO2: 100% 99% 97% (!) 94%   Weight:       Height:        07/05/25 1410 07/05/25 1420 07/05/25 1526 07/05/25 1615   BP: (!) 102/57 (!) 102/59 (!) 105/55 112/65   Pulse: 78 79 81 78   Resp: 19 18 20 18   Temp:    99.5 °F (37.5 °C)   TempSrc:    Oral   SpO2: 97% 98% 100% 96%   Weight:       Height:        07/05/25 1646 07/05/25 2013 07/05/25 2019 07/06/25 0020   BP:   124/70 102/60   Pulse: 77 75 81 72   Resp:   18 18   Temp:   98.9 °F (37.2 °C) 98.8 °F (37.1 °C)   TempSrc:   Oral Oral   SpO2:   (!) 94% (!) 94%   Weight:       Height:        07/06/25 0407 07/06/25 0430 07/06/25 0600   BP:  110/62    Pulse: 69 70    Resp:  18    Temp:  98.2 °F (36.8 °C)    TempSrc:  Oral    SpO2:  97%    Weight:   80.1 kg (176 lb 10.1 oz)   Height:          Abnormal Lab Results:  Labs Reviewed   COMPREHENSIVE METABOLIC PANEL - Abnormal       Result Value    Sodium 139      Potassium 2.9 (*)     Chloride 104      CO2 18 (*)     Glucose 120 (*)     BUN 18      Creatinine 1.2      Calcium 9.0      Protein Total 7.0      Albumin 3.6      Bilirubin Total 0.8      ALP 61      AST 43      ALT 58 (*)     Anion Gap 17 (*)     eGFR >60     URINALYSIS, REFLEX TO URINE CULTURE - Abnormal    Color, UA Yellow      Appearance, UA Hazy (*)     pH, UA  6.0      Spec Grav UA 1.010      Protein, UA 2+ (*)     Glucose, UA 4+ (*)     Ketones, UA Negative      Bilirubin, UA Negative      Blood, UA 3+ (*)     Nitrites, UA Negative      Urobilinogen, UA Negative      Leukocyte Esterase, UA 2+ (*)    CBC WITH DIFFERENTIAL - Abnormal    WBC 4.12      RBC 3.95 (*)     HGB 11.5 (*)     HCT 35.6 (*)     MCV 90      MCH 29.1      MCHC 32.3      RDW 16.7 (*)     Platelet Count 118 (*)     MPV 8.2 (*)     Nucleated RBC 0      Neut % 84.0 (*)     Lymph % 5.6 (*)     Mono % 9.7      Eos % 0.0      Basophil % 0.2      Imm Grans % 0.5      Neut # 3.46      Lymph # 0.23 (*)     Mono # 0.40      Eos # 0.00      Baso # 0.01      Imm Grans # 0.02     URINALYSIS MICROSCOPIC - Abnormal    RBC, UA >100 (*)     WBC, UA >100 (*)     WBC Clumps, UA Many (*)     Bacteria, UA Occasional      Yeast, UA None      Hyaline Casts, UA 0      Microscopic Comment       LACTIC ACID, PLASMA - Abnormal    Lactic Acid Level 2.3 (*)     Narrative:     Falsely low lactic acid results can be found in samples containing >=13.0 mg/dL total bilirubin and/or >=3.5 mg/dL direct bilirubin.    B-TYPE NATRIURETIC PEPTIDE - Normal    BNP 85     CULTURE, URINE   CULTURE, BLOOD   CULTURE, BLOOD   CLOSTRIDIOIDES DIFFICILE   CBC W/ AUTO DIFFERENTIAL    Narrative:     The following orders were created for panel order CBC auto differential.  Procedure                               Abnormality         Status                     ---------                               -----------         ------                     CBC with Differential[[id number]]       Abnormal            Final result                 Please view results for these tests on the individual orders.   GREY TOP URINE HOLD   EXTRA TUBES    Narrative:     The following orders were created for panel order EXTRA TUBES.  Procedure                               Abnormality         Status                     ---------                               -----------          ------                     Story Top Hold[[id number]]                                                                Please view results for these tests on the individual orders.   GREY TOP HOLD   POCT GLUCOSE MONITORING CONTINUOUS        All Lab Results:  Results for orders placed or performed during the hospital encounter of 07/05/25   Urinalysis, Reflex to Urine Culture Urine, Clean Catch    Collection Time: 07/05/25  1:43 PM    Specimen: Urine   Result Value Ref Range    Color, UA Yellow Straw, Lary, Yellow, Light-Orange    Appearance, UA Hazy (A) Clear    pH, UA 6.0 5.0 - 8.0    Spec Grav UA 1.010 1.005 - 1.030    Protein, UA 2+ (A) Negative    Glucose, UA 4+ (A) Negative    Ketones, UA Negative Negative    Bilirubin, UA Negative Negative    Blood, UA 3+ (A) Negative    Nitrites, UA Negative Negative    Urobilinogen, UA Negative <2.0 EU/dL    Leukocyte Esterase, UA 2+ (A) Negative   Urinalysis Microscopic    Collection Time: 07/05/25  1:43 PM   Result Value Ref Range    RBC, UA >100 (H) 0 - 4 /HPF    WBC, UA >100 (H) 0 - 5 /HPF    WBC Clumps, UA Many (A) None, Rare    Bacteria, UA Occasional None, Rare, Occasional /HPF    Yeast, UA None None /HPF    Hyaline Casts, UA 0 0 - 1 /LPF    Microscopic Comment     Comprehensive metabolic panel    Collection Time: 07/05/25  1:46 PM   Result Value Ref Range    Sodium 139 136 - 145 mmol/L    Potassium 2.9 (L) 3.5 - 5.1 mmol/L    Chloride 104 95 - 110 mmol/L    CO2 18 (L) 23 - 29 mmol/L    Glucose 120 (H) 70 - 110 mg/dL    BUN 18 8 - 23 mg/dL    Creatinine 1.2 0.5 - 1.4 mg/dL    Calcium 9.0 8.7 - 10.5 mg/dL    Protein Total 7.0 6.0 - 8.4 gm/dL    Albumin 3.6 3.5 - 5.2 g/dL    Bilirubin Total 0.8 0.1 - 1.0 mg/dL    ALP 61 40 - 150 unit/L    AST 43 11 - 45 unit/L    ALT 58 (H) 10 - 44 unit/L    Anion Gap 17 (H) 8 - 16 mmol/L    eGFR >60 >60 mL/min/1.73/m2   CBC with Differential    Collection Time: 07/05/25  1:46 PM   Result Value Ref Range    WBC 4.12 3.90 - 12.70 K/uL     RBC 3.95 (L) 4.60 - 6.20 M/uL    HGB 11.5 (L) 14.0 - 18.0 gm/dL    HCT 35.6 (L) 40.0 - 54.0 %    MCV 90 82 - 98 fL    MCH 29.1 27.0 - 31.0 pg    MCHC 32.3 32.0 - 36.0 g/dL    RDW 16.7 (H) 11.5 - 14.5 %    Platelet Count 118 (L) 150 - 450 K/uL    MPV 8.2 (L) 9.2 - 12.9 fL    Nucleated RBC 0 <=0 /100 WBC    Neut % 84.0 (H) 38 - 73 %    Lymph % 5.6 (L) 18 - 48 %    Mono % 9.7 4 - 15 %    Eos % 0.0 <=8 %    Basophil % 0.2 <=1.9 %    Imm Grans % 0.5 0.0 - 0.5 %    Neut # 3.46 1.8 - 7.7 K/uL    Lymph # 0.23 (L) 1 - 4.8 K/uL    Mono # 0.40 0.3 - 1 K/uL    Eos # 0.00 <=0.5 K/uL    Baso # 0.01 <=0.2 K/uL    Imm Grans # 0.02 0.00 - 0.04 K/uL   Prostate Specific Antigen, Diagnostic    Collection Time: 07/05/25  1:46 PM   Result Value Ref Range    Prostate Specific Antigen 0.06 <=4.00 ng/mL   Magnesium    Collection Time: 07/05/25  1:46 PM   Result Value Ref Range    Magnesium  1.5 (L) 1.6 - 2.6 mg/dL   EKG 12-lead    Collection Time: 07/05/25  1:56 PM   Result Value Ref Range    QRS Duration 100 ms    [facility] QTC Calculation 426 ms   Lactic acid, plasma    Collection Time: 07/05/25  2:46 PM   Result Value Ref Range    Lactic Acid Level 2.3 (H) 0.5 - 2.2 mmol/L   Brain natriuretic peptide    Collection Time: 07/05/25  3:09 PM   Result Value Ref Range    BNP 85 0 - 99 pg/mL   POCT glucose    Collection Time: 07/05/25  5:46 PM   Result Value Ref Range    POCT Glucose 121 (H) 70 - 110 mg/dL   Lactic Acid, Plasma    Collection Time: 07/05/25  5:51 PM   Result Value Ref Range    Lactic Acid Level 1.9 0.5 - 2.2 mmol/L   POCT glucose    Collection Time: 07/05/25 11:51 PM   Result Value Ref Range    POCT Glucose 101 70 - 110 mg/dL   CBC with Differential    Collection Time: 07/06/25  5:52 AM   Result Value Ref Range    WBC 2.68 (L) 3.90 - 12.70 K/uL    RBC 3.52 (L) 4.60 - 6.20 M/uL    HGB 10.1 (L) 14.0 - 18.0 gm/dL    HCT 32.1 (L) 40.0 - 54.0 %    MCV 91 82 - 98 fL    MCH 28.7 27.0 - 31.0 pg    MCHC 31.5 (L) 32.0 - 36.0 g/dL    RDW 17.0  (H) 11.5 - 14.5 %    Platelet Count 158 150 - 450 K/uL    MPV 9.0 (L) 9.2 - 12.9 fL    Nucleated RBC 0 <=0 /100 WBC    Neut % 66.0 38 - 73 %    Lymph % 11.2 (L) 18 - 48 %    Mono % 21.6 (H) 4 - 15 %    Eos % 0.4 <=8 %    Basophil % 0.4 <=1.9 %    Imm Grans % 0.4 0.0 - 0.5 %    Neut # 1.77 (L) 1.8 - 7.7 K/uL    Lymph # 0.30 (L) 1 - 4.8 K/uL    Mono # 0.58 0.3 - 1 K/uL    Eos # 0.01 <=0.5 K/uL    Baso # 0.01 <=0.2 K/uL    Imm Grans # 0.01 0.00 - 0.04 K/uL   POCT glucose    Collection Time: 07/06/25  6:19 AM   Result Value Ref Range    POCT Glucose 110 70 - 110 mg/dL       Imaging Results:  Imaging Results              X-Ray Chest AP Portable (Final result)  Result time 07/05/25 15:19:59      Final result by Pelon Stanford MD (07/05/25 15:19:59)                   Impression:     No acute process.    Finalized on: 7/5/2025 3:19 PM By:  Pelon Stanford MD  [facility]# [id number]      2025-07-05 15:22:04.085     [facility]               Narrative:    EXAM:  XR CHEST AP PORTABLE    CLINICAL INDICATION: Weakness.    COMPARISON STUDY:  12/14/2024.    FINDINGS: No change.  Normal size heart.  Left chest wall AICD with intact leads.  No vascular congestion.  Lungs are clear.  Multilevel posterior cervical spinal fusion.  Small calcium deposit left rotator cuff footprint.                                         The EKG was ordered, reviewed, and independently interpreted by the ED provider.  Interpretation time: 13:56  Rate:  79 BPM  Rhythm: normal sinus rhythm  Interpretation: Moderate voltage criteria for LVH, may be normal variant ( R in aVL , Efrain product ). Inferior infarct ,age undetermined. Possible Anterior infarct ,age undetermined . No STEMI.         The Emergency Provider reviewed the vital signs and test results, which are outlined above.     ED Discussion     1:00 PM: Discussed pt's case with Dr. Valdez (Urology) who recommends trying to cath the pt with a 20 Fr Coude.     1:30 PM: Dr. Valdez was informed catheter was  successfully placed. He now recommends having the pt f/u with Dr. Lassiter.    2:35 PM: Discussed pt's case with Lillie Nina NP, ([facility]) who recommends adding a lactic acid, adding a BNP and chest, infusing another bolus, and to evaluate pt's BP post completion of 1st unit.    3:05 PM: Discussed pt's case with Dr. Luis ([facility]) who recommends a sepsis workup, blood cultures, and a CXR.     3:29 PM: Discussed case with Dr. Toby MD ([facility]). Dr. Luis agrees with current care and management of pt and accepts admission.   Admitting Service: [facility]  Admitting Physician: Dr. Luis  Admit to: IP Med/Tele    3:29 PM: Re-evaluated pt. I have discussed test results, shared treatment plan, and the need for admission with patient/family/caretaker at bedside. Pt and/or family/caretaker express understanding at this time and agree with all information. All questions answered. Pt/caretaker/family member(s) have no further questions or concerns at this time. Pt is ready for admit.          Medical Decision Making  Amount and/or Complexity of Data Reviewed  Labs: ordered. Decision-making details documented in ED Course.  Radiology: ordered. Decision-making details documented in ED Course.  ECG/medicine tests: ordered and independent interpretation performed. Decision-making details documented in ED Course.    Risk  Prescription drug management.  Decision regarding hospitalization.                ED Medication(s):  Medications   meropenem injection 1 g (1 g Intravenous Given 7/5/25 9883)   clopidogreL tablet 75 mg (has no administration in time range)   tamsulosin 24 hr capsule 0.4 mg (has no administration in time range)   busPIRone tablet 5 mg (has no administration in time range)   sodium chloride 0.9% flush 10 mL (has no administration in time range)   naloxone 0.4 mg/mL injection 0.02 mg (has no administration in time range)   glucose chewable tablet 16 g (has no  administration in time range)   glucose chewable tablet 24 g (has no administration in time range)   dextrose 50% injection 12.5 g (has no administration in time range)   dextrose 50% injection 25 g (has no administration in time range)   glucagon (human recombinant) injection 1 mg (has no administration in time range)   enoxaparin injection 40 mg (40 mg Subcutaneous Given 7/5/25 1651)   acetaminophen tablet 650 mg (has no administration in time range)   HYDROcodone-acetaminophen 5-325 mg per tablet 1 tablet (has no administration in time range)   ondansetron injection 4 mg (has no administration in time range)   prochlorperazine injection Soln 5 mg (has no administration in time range)   insulin aspart U-100 pen 0-10 Units (has no administration in time range)   melatonin tablet 6 mg (6 mg Oral Given 7/5/25 2056)   0.9% NaCl infusion ( Intravenous Verify Only 7/5/25 1756)   mupirocin 2 % ointment ( Nasal Given 7/5/25 2056)   morphine injection 4 mg (4 mg Intravenous Given 7/5/25 1256)   ondansetron injection 4 mg (4 mg Intravenous Given 7/5/25 1257)   LIDOcaine HCl 2% urojet ( Mucous Membrane Given 7/5/25 1315)   0.9% NaCl infusion (1,000 mLs Intravenous New Bag 7/5/25 1345)   potassium bicarbonate disintegrating tablet 20 mEq (20 mEq Oral Given 7/5/25 1439)   potassium chloride SA CR tablet 40 mEq (40 mEq Oral Given 7/5/25 1651)   magnesium sulfate 2g in water 50mL IVPB (premix) (0 g Intravenous Stopped 7/5/25 2256)       Current Discharge Medication List                  Scribe Attestation:   Scribe #1: I performed the above scribed service and the documentation accurately describes the services I performed. I attest to the accuracy of the note.     Attending:   Physician Attestation Statement for Scribe #1: I, Jordan Doan Jr., MD, personally performed the services described in this documentation, as scribed by Vipul Hendrix, in my presence, and it is both accurate and complete.           Clinical Impression        ICD-10-CM ICD-9-CM   1. Prostate cancer  C61 185   2. Hypotension  I95.9 458.9   3. Weakness  R53.1 780.79   4. Complicated urinary tract infection  N39.0 599.0   5. Sepsis  A41.9 038.9     995.91   6. Chest pain  R07.9 786.50   7. Urine retention  R33.9 788.20       Disposition:   Disposition: Admitted  Condition: Jordan Ness Jr., MD  07/06/25 0709

## 2025-07-05 NOTE — HPI
Patient is a 64-year-old  male with past medical history notable for chronic systolic heart failure (EF 20-25%) status post AICD, type 2 diabetes mellitus, essential hypertension, carotid disease, prostate cancer (on treatment) who presents to the ED on 07/05/2025 for evaluation of difficulty urinating.  Patient reports that he has been having increased pain with urination for the last 3-4 days and over the last 2 days has not been able to urinate.  His wife attempted to straight cath him at home with no success.  He reports lower abdominal pressure/discomfort as well as a few drops of blood at the end of history when he was able to urinate previously.  Also reports 2 day history of watery diarrhea, up to 3 stools per day.  Denies chest pain, shortness of breath, nausea, vomiting, decreased p.o. intake, fevers, chills.  In the ED, initial vital signs:  Patient afebrile, heart rate 101, respiratory rate 16, blood pressure 181/93, sats 99% on room air.  Straight cath x2 was attempted in the ED but was unsuccessful, Urology was consulted and recommended a 20 [place] coude which was placed.  Shortly after catheter placement patient became diaphoretic and hypotensive- received 1L NS bolus.  Initial workup:  CBC without leukocytosis, hemoglobin 11.5, platelets 118, potassium 2.9, bicarb 18, anion gap 17, creatinine 1.2, BNP within normal limits.  Lactate 2.3.  UA consistent with UTI.  He received p.o. potassium 20 mEq, IV meropenem based on previous cultures. Hospital medicine consulted for admission

## 2025-07-05 NOTE — ED NOTES
MD Franki notified that pt is diaphoretic and hypotensive. Pt placed in an inverted position. Verbal order for fluids given. Pt is awake but drowsy and oriented.

## 2025-07-05 NOTE — ASSESSMENT & PLAN NOTE
"Patient has sepsis without organ dysfunction secondary to Urinary Tract Infection. A review of systems was completed. Patient's sepsis is undergoing treatment     Current Antibiotics    meropenem injection 1 g, Every 8 hours (non-standard times), Intravenous    Lactate  Recent Labs   Lab 07/05/25  1446   LACTATE 2.3*     Culture Data  Blood Cultures No results found for: "CULTBLD"   Urine Culture   Urine Culture   Date Value Ref Range Status   06/12/2025 >100,000 cfu/ml Escherichia coli ESBL (A)  Final     Urine Culture, Routine   Date Value Ref Range Status   02/22/2025 ESCHERICHIA COLI ESBL  >100,000 cfu/ml   (A)  Final      mSOFA  MSOFA Total  Min: 0   Min taken time: 07/05/25 1400  Max: 1   Max taken time: 07/05/25 1601    Plan  - Antibiotics as listed above  - Fluid resuscitation as follows:received 1L NS bolus in ED (limited due to hx of CHF)   - Continue NS at 100/hr for 10 hours   - Trend lactate to resolution  - Follow up culture data  - Vasopressors were not needed  - rule out Cdif given reported diarrhea        "

## 2025-07-05 NOTE — ASSESSMENT & PLAN NOTE
Patient has Systolic (HFrEF) heart failure that is Chronic. On presentation their CHF was well compensated. Most recent BNP and echo results are listed below.  Recent Labs     07/05/25  1509   BNP 85     Latest ECHO  No results found for this or any previous visit.    Current Heart Failure Medications       Plan  - Monitor strict I&Os and daily weights.    - Place on telemetry  - Low sodium diet  - Place on fluid restriction of 1.5 L.   - Cardiology has not been consulted  - The patient's volume status is at their baseline  - Last known EF 20-25% in 2018, s/p AICD, followed by Cardiology Dr. Dyer as outpatient   - hold home entresto and lasix in setting of sepsis and hypotension

## 2025-07-05 NOTE — ASSESSMENT & PLAN NOTE
- in setting of known BPH, prostate cancer   - s/p 20Fr Coude placement in the ED   - Continue IV Merrem (has hx of ESBL E. Coli)   - urine culture pending   - Anticipate discharge with batres catheter in place- per urology Dr. Valdez- he will set pt up to see Dr. Lassiter in clinic on discharge

## 2025-07-06 LAB
ABSOLUTE EOSINOPHIL (OHS): 0.01 K/UL
ABSOLUTE MONOCYTE (OHS): 0.58 K/UL (ref 0.3–1)
ABSOLUTE NEUTROPHIL COUNT (OHS): 1.77 K/UL (ref 1.8–7.7)
ANION GAP (OHS): 8 MMOL/L (ref 8–16)
BASOPHILS # BLD AUTO: 0.01 K/UL
BASOPHILS NFR BLD AUTO: 0.4 %
BUN SERPL-MCNC: 11 MG/DL (ref 8–23)
CALCIUM SERPL-MCNC: 8.7 MG/DL (ref 8.7–10.5)
CHLORIDE SERPL-SCNC: 104 MMOL/L (ref 95–110)
CO2 SERPL-SCNC: 25 MMOL/L (ref 23–29)
CREAT SERPL-MCNC: 1.1 MG/DL (ref 0.5–1.4)
EAG (OHS): 120 MG/DL (ref 68–131)
ERYTHROCYTE [DISTWIDTH] IN BLOOD BY AUTOMATED COUNT: 17 % (ref 11.5–14.5)
GFR SERPLBLD CREATININE-BSD FMLA CKD-EPI: >60 ML/MIN/1.73/M2
GLUCOSE SERPL-MCNC: 97 MG/DL (ref 70–110)
HBA1C MFR BLD: 5.8 % (ref 4–5.6)
HCT VFR BLD AUTO: 32.1 % (ref 40–54)
HGB BLD-MCNC: 10.1 GM/DL (ref 14–18)
HOLD SPECIMEN: NORMAL
IMM GRANULOCYTES # BLD AUTO: 0.01 K/UL (ref 0–0.04)
IMM GRANULOCYTES NFR BLD AUTO: 0.4 % (ref 0–0.5)
LYMPHOCYTES # BLD AUTO: 0.3 K/UL (ref 1–4.8)
MAGNESIUM SERPL-MCNC: 2.1 MG/DL (ref 1.6–2.6)
MCH RBC QN AUTO: 28.7 PG (ref 27–31)
MCHC RBC AUTO-ENTMCNC: 31.5 G/DL (ref 32–36)
MCV RBC AUTO: 91 FL (ref 82–98)
NUCLEATED RBC (/100WBC) (OHS): 0 /100 WBC
OHS QRS DURATION: 100 MS
OHS QTC CALCULATION: 426 MS
PHOSPHATE SERPL-MCNC: 3.5 MG/DL (ref 2.7–4.5)
PLATELET # BLD AUTO: 158 K/UL (ref 150–450)
PMV BLD AUTO: 9 FL (ref 9.2–12.9)
POCT GLUCOSE: 110 MG/DL (ref 70–110)
POCT GLUCOSE: 169 MG/DL (ref 70–110)
POCT GLUCOSE: 85 MG/DL (ref 70–110)
POCT GLUCOSE: 94 MG/DL (ref 70–110)
POTASSIUM SERPL-SCNC: 3.7 MMOL/L (ref 3.5–5.1)
RBC # BLD AUTO: 3.52 M/UL (ref 4.6–6.2)
RELATIVE EOSINOPHIL (OHS): 0.4 %
RELATIVE LYMPHOCYTE (OHS): 11.2 % (ref 18–48)
RELATIVE MONOCYTE (OHS): 21.6 % (ref 4–15)
RELATIVE NEUTROPHIL (OHS): 66 % (ref 38–73)
SODIUM SERPL-SCNC: 137 MMOL/L (ref 136–145)
WBC # BLD AUTO: 2.68 K/UL (ref 3.9–12.7)

## 2025-07-06 PROCEDURE — 25000003 PHARM REV CODE 250: Performed by: INTERNAL MEDICINE

## 2025-07-06 PROCEDURE — 83036 HEMOGLOBIN GLYCOSYLATED A1C: CPT | Performed by: INTERNAL MEDICINE

## 2025-07-06 PROCEDURE — 82310 ASSAY OF CALCIUM: CPT | Performed by: INTERNAL MEDICINE

## 2025-07-06 PROCEDURE — 85025 COMPLETE CBC W/AUTO DIFF WBC: CPT | Performed by: INTERNAL MEDICINE

## 2025-07-06 PROCEDURE — 63600175 PHARM REV CODE 636 W HCPCS: Performed by: EMERGENCY MEDICINE

## 2025-07-06 PROCEDURE — 99223 1ST HOSP IP/OBS HIGH 75: CPT | Mod: ,,, | Performed by: UROLOGY

## 2025-07-06 PROCEDURE — 63600175 PHARM REV CODE 636 W HCPCS: Performed by: INTERNAL MEDICINE

## 2025-07-06 PROCEDURE — 27000207 HC ISOLATION

## 2025-07-06 PROCEDURE — 21400001 HC TELEMETRY ROOM

## 2025-07-06 PROCEDURE — 84100 ASSAY OF PHOSPHORUS: CPT | Performed by: INTERNAL MEDICINE

## 2025-07-06 PROCEDURE — 83735 ASSAY OF MAGNESIUM: CPT | Performed by: INTERNAL MEDICINE

## 2025-07-06 PROCEDURE — 36415 COLL VENOUS BLD VENIPUNCTURE: CPT | Performed by: INTERNAL MEDICINE

## 2025-07-06 RX ADMIN — HYDROCODONE BITARTRATE AND ACETAMINOPHEN 1 TABLET: 5; 325 TABLET ORAL at 04:07

## 2025-07-06 RX ADMIN — HYDROCODONE BITARTRATE AND ACETAMINOPHEN 1 TABLET: 5; 325 TABLET ORAL at 09:07

## 2025-07-06 RX ADMIN — MUPIROCIN: 20 OINTMENT TOPICAL at 08:07

## 2025-07-06 RX ADMIN — MEROPENEM 1 G: 1 INJECTION INTRAVENOUS at 05:07

## 2025-07-06 RX ADMIN — MUPIROCIN: 20 OINTMENT TOPICAL at 09:07

## 2025-07-06 RX ADMIN — ENOXAPARIN SODIUM 40 MG: 40 INJECTION SUBCUTANEOUS at 04:07

## 2025-07-06 RX ADMIN — TAMSULOSIN HYDROCHLORIDE 0.4 MG: 0.4 CAPSULE ORAL at 08:07

## 2025-07-06 RX ADMIN — MEROPENEM 1 G: 1 INJECTION INTRAVENOUS at 09:07

## 2025-07-06 RX ADMIN — INSULIN ASPART 2 UNITS: 100 INJECTION, SOLUTION INTRAVENOUS; SUBCUTANEOUS at 07:07

## 2025-07-06 RX ADMIN — CLOPIDOGREL 75 MG: 75 TABLET ORAL at 09:07

## 2025-07-06 NOTE — PLAN OF CARE
[facility]  Initial Discharge Assessment       Primary Care Provider: Ochoa Lane MD    Admission Diagnosis: Weakness [R53.1]  Complicated urinary tract infection [N39.0]  Chest pain [R07.9]  Hypotension [I95.9]  Sepsis [A41.9]    Admission Date: 7/5/2025  Expected Discharge Date:     Transition of Care Barriers: None    Payor: HUMANA MANAGED MEDICARE / Plan: HUMANA SNP HMO PPO SPECIAL NEEDS / Product Type: Medicare Advantage /     Extended Emergency Contact Information  Primary Emergency Contact: Michael Smith  Mobile Phone: [phone]  Relation: Spouse   needed? No    Discharge Plan A: Home, Home with family         [facility] [address]  [address] [facility] [address]  Phone: [phone] Fax: [phone]    [facility] [address] [place], [place] [address]RENETTA LIN ([address]   [address] [place] [address] DR. PETAR LIN ([facility] [address]  Phone: [phone] Fax: [phone]    [facility]  [address]  [facility] [address]  Phone: [phone] Fax: [phone]      Initial Assessment (most recent)       Adult Discharge Assessment - 07/06/25 1119          Discharge Assessment    Assessment Type Discharge Planning Assessment     Confirmed/corrected address, phone number and insurance Yes     Confirmed Demographics Correct on Facesheet     Source of Information patient     People in Home spouse     Name(s) of People in Home Michael Smith, spouse [phone]     Do you expect to return to your current living situation? Yes     Do you have help at home or someone to help you manage your care at home? Yes     Who are your caregiver(s) and their phone number(s)? Michael Smith, spouse [phone]     Prior to hospitilization cognitive status: Alert/Oriented     Current cognitive status: Alert/Oriented     Walking or Climbing Stairs Difficulty no     Dressing/Bathing Difficulty no      Equipment Currently Used at Home none     Readmission within 30 days? No     Patient currently being followed by outpatient case management? No     Do you currently have service(s) that help you manage your care at home? No     Do you take prescription medications? Yes     Do you have prescription coverage? Yes     Do you have any problems affording any of your prescribed medications? No     Is the patient taking medications as prescribed? yes     Who is going to help you get home at discharge? Michael Smith, spouse [phone]     How do you get to doctors appointments? car, drives self     Are you on dialysis? No     Do you take coumadin? No     Discharge Plan A Home;Home with family     DME Needed Upon Discharge  none     Discharge Plan discussed with: Patient     Transition of Care Barriers None                      MSW met with pt at the bedside to complete d/c assessment. Pt reports no needs and spouse available at d/c. RAFIQ KEY

## 2025-07-06 NOTE — CONSULTS
Chief Complaint:  Retention    HPI:   Hector Smith is a 64 y.o. male well-known to the urology service with a history of prostate cancer and BPH with urinary obstruction status post TURP in January of this year who we will be completing XRT soon presented yesterday for retention.  There was some mild difficulty with placement of his catheter but ultimately a 20 Japanese coude was successfully placed.  After the Rodrigues was placed, the patient became diaphoretic and hypotensive.  Hospital medicine was consulted for admission.  Creatinine normal, white count normal, hemoglobin 11.5, UA shows blood, leukocyte esterase, UA micro shows greater than 100 RBCs and WBCs with occasional bacteria, urine culture pending, blood cultures pending.  Patient feeling better today.    PMH:  Past Medical History:   Diagnosis Date    Diabetes mellitus, type 2     Hyperlipidemia     Hypertension        PSH:  Past Surgical History:   Procedure Laterality Date    SPINAL FUSION N/A 2019    TRANSURETHRAL RESECTION OF PROSTATE N/A 1/6/2025    Procedure: TURP (TRANSURETHRAL RESECTION OF PROSTATE);  Surgeon: Ron Lassiter MD;  Location: [facility];  Service: Urology;  Laterality: N/A;       Family History:  Family History   Problem Relation Name Age of Onset    Colon cancer Mother      Throat cancer Sister      Prostate cancer Neg Hx      Breast cancer Neg Hx         Social History:  Social History[1]     Review of Systems:  General: No fever, chills  Skin: No rashes  Chest:  Denies cough and sputum production  Heart: Denies chest pain  Resp: Denies dyspnea  Abdomen: Denies diarrhea, abdominal pain, hematemesis, or blood in stool.  Musculoskeletal: No joint stiffness or swelling. Denies back pain.  : see HPI  Neuro: no dizziness or weakness    Allergies:  Adhesive and Suture, silk    Medications:  Current Medications[2]    Physical Exam:  Vitals:    07/06/25 1131   BP: 107/63   Pulse: 70   Resp: 18   Temp: 98.6 °F (37 °C)     Body  mass index is 32.31 kg/m².  General: awake, alert, cooperative  Head: NC/AT  Ears: external ears normal  Eyes: sclera normal  Lungs: normal inspiration, NAD  Heart: well-perfused  Abdomen: Soft, NT, ND  :  Rodrigues in place draining clear yellow urine with some slight debris in the tube  Skin: The skin is warm and dry  Ext: No c/c/e.  Neuro: grossly intact, AOx3    RADIOLOGY:  No recent relevant imaging available for review.    LABS:  I personally reviewed the following lab values:  Lab Results   Component Value Date    WBC 2.68 (L) 2025    HGB 10.1 (L) 2025    HCT 32.1 (L) 2025     2025     2025    K 3.7 2025     2025    CREATININE 1.1 2025    BUN 11 2025    CO2 25 2025    PSA 0.06 2025    HGBA1C 6.3 2024    ALT 58 (H) 2025    AST 43 2025     Assessment/Plan:   Hector Smith is a 64 y.o. male with:    Urinary retention - continue Rodrigues catheter at discharge, patient has follow-up scheduled with Dr. Lassiter next week, he should keep that appointment    UTI - continue antibiotics, continued to trend cultures    Urology to sign off, call with further questions or concerns    Thank you for allowing me the opportunity to participate in this patient's care.     Haroldo Valdez MD  Urology         [1]   Social History  Tobacco Use    Smoking status: Former     Current packs/day: 0.00     Types: Cigarettes     Quit date:      Years since quittin.5     Passive exposure: Never    Smokeless tobacco: Never   Substance Use Topics    Alcohol use: Yes     Alcohol/week: 1.0 standard drink of alcohol     Types: 1 Cans of beer per week     Comment: About 1 drink a week socially    Drug use: Never   [2]   Current Facility-Administered Medications:     acetaminophen tablet 650 mg, 650 mg, Oral, Q4H PRN, Viktoriya Luis MD    busPIRone tablet 5 mg, 5 mg, Oral, BID PRN, Viktoriya Luis MD    clopidogreL tablet  75 mg, 75 mg, Oral, Daily, Viktoriya Luis MD, 75 mg at 07/06/25 0930    dextrose 50% injection 12.5 g, 12.5 g, Intravenous, PRN, Viktoriya Luis MD    dextrose 50% injection 25 g, 25 g, Intravenous, PRN, Viktoriya Luis MD    enoxaparin injection 40 mg, 40 mg, Subcutaneous, Daily, Viktoriya Luis MD, 40 mg at 07/05/25 1651    glucagon (human recombinant) injection 1 mg, 1 mg, Intramuscular, PRN, Viktoriya Luis MD    glucose chewable tablet 16 g, 16 g, Oral, PRN, Viktoriya Luis MD    glucose chewable tablet 24 g, 24 g, Oral, PRN, Viktoriya Luis MD    HYDROcodone-acetaminophen 5-325 mg per tablet 1 tablet, 1 tablet, Oral, Q6H PRN, Viktoriya Luis MD, 1 tablet at 07/06/25 0929    insulin aspart U-100 pen 0-10 Units, 0-10 Units, Subcutaneous, QID (AC + HS) PRN, Viktoriya Luis MD    melatonin tablet 6 mg, 6 mg, Oral, Nightly PRN, Viktoriya Luis MD, 6 mg at 07/05/25 2056    meropenem injection 1 g, 1 g, Intravenous, Q8H, Jordan Doan Jr., MD, 1 g at 07/06/25 0931    mupirocin 2 % ointment, , Nasal, BID, Viktoriya Luis MD, Given at 07/06/25 0950    naloxone 0.4 mg/mL injection 0.02 mg, 0.02 mg, Intravenous, PRN, Viktoriya Luis MD    ondansetron injection 4 mg, 4 mg, Intravenous, Q8H PRN, Viktoriya Luis MD    prochlorperazine injection Soln 5 mg, 5 mg, Intravenous, Q6H PRN, Viktoriya Luis MD    sodium chloride 0.9% flush 10 mL, 10 mL, Intravenous, Q12H PRN, Viktoriya Luis MD    tamsulosin 24 hr capsule 0.4 mg, 0.4 mg, Oral, QHS, Viktoriya Luis MD

## 2025-07-06 NOTE — PROGRESS NOTES
[facility] - [facility]  Adult Nutrition  Progress Note    SUMMARY       Recommendations    Recommendation/Intervention:   1. Recommend continuing a Heart Healthy, consistent carb diet.   2. Encourage PO intake with feeding assistance as warranted.   3. Weigh x1 weekly.     Goals:   1. Pt will consume and tolerate >75% EEN and EPN by RD follow up.    Nutrition Goal Status: new  Communication of RD Recs: other (comment) (RD progress note, sticky note)    Nutrition Discharge Planning    Nutrition Discharge Planning: Therapeutic diet (comments)  Therapeutic diet (comments): Heart Healthy, consistent carb diet    Assessment and Plan    Interventions:  1. Consistent carbohydrate, low sodium, low fat diet.  2. Collaboration by nutrition professional with other providers.     Malnutrition Assessment 07/06/2025:     Skin (Micronutrient): none (Angel score 22)                                 Reason for Assessment    Reason For Assessment: identified at risk by screening criteria ([facility])  Diagnosis: other (see comments) (Complicated UTI)    General Information Comments:   7/6/25: 64 y.o. male admitted for a complicated UTI. PMH: CHF, DM, HTN, HLD, CAD, CA on chemo. Pt is currently getting a Heart Healthy, Consistent carb diet without any ONS. Pt seen for malnutrition screening. Per EMR pt reported an unintentional weight loss of 14-23# with a decrease in intake/appetite PTA. RD unable to speak w/ pt dt RD remote status and pt isolation status. Per EMR pt has lost 5 kg in 2 mo which is a 6% weight loss and not deemed significant. Pt has a BMI WNL, no PIs noted, has been eating 50% of all meals since admit but per RN report pt has a distended abd and abd discomfort. Nutrition education material provided via EMR for DC packet. Labs and meds reviewed.    Nutrition/Diet History    Spiritual, Cultural Beliefs, Scientologist Practices, Values that Affect Care: no  Food Allergies: NKFA  Factors Affecting Nutritional Intake: abdominal  "distension, abdominal pain, altered gastrointestinal function  Nutrition-related SDOH: Unable to assess at this time    Anthropometrics    Height: 5' 2" (157.5 cm)  Height (inches): 62 in  Height Method: Stated  Weight: 80.1 kg (176 lb 10.1 oz)  Weight (lb): 176.63 lb  Weight Method: Bed Scale  Ideal Body Weight (IBW), Male: 118 lb  % Ideal Body Weight, Male (lb): 149.69 %  BMI (Calculated): 32.3  BMI Grade: 35 - 39.9 - obesity - grade II       Wt Readings from Last 15 Encounters:   07/06/25 80.1 kg (176 lb 10.1 oz)   05/23/25 85.1 kg (187 lb 8 oz)   04/04/25 81.2 kg (179 lb 0.2 oz)   03/21/25 83.9 kg (184 lb 15.5 oz)   02/22/25 83.9 kg (185 lb)   02/18/25 83.7 kg (184 lb 8.4 oz)   02/07/25 79.8 kg (176 lb)   01/07/25 83.6 kg (184 lb 4.9 oz)   01/03/25 81.6 kg (180 lb)   12/22/24 81.2 kg (179 lb 1.6 oz)   12/04/24 81.4 kg (179 lb 5.5 oz)   10/16/24 94.3 kg (207 lb 14.3 oz)   02/12/24 94.3 kg (208 lb)   09/09/22 94.3 kg (208 lb)   05/24/22 94.4 kg (208 lb 1.8 oz)     Lab/Procedures/Meds    Pertinent Labs Reviewed: reviewed  Pertinent Medications Reviewed: reviewed    BMP  Lab Results   Component Value Date     07/06/2025    K 3.7 07/06/2025     07/06/2025    CO2 25 07/06/2025    BUN 11 07/06/2025    CREATININE 1.1 07/06/2025    CALCIUM 8.7 07/06/2025    ANIONGAP 8 07/06/2025    EGFRNORACEVR >60 07/06/2025     Lab Results   Component Value Date    CALCIUM 8.7 07/06/2025    PHOS 3.5 07/06/2025     Lab Results   Component Value Date    ALBUMIN 3.6 07/05/2025     Lab Results   Component Value Date    ALT 58 (H) 07/05/2025    AST 43 07/05/2025    ALKPHOS 61 07/05/2025    BILITOT 0.8 07/05/2025     Recent Labs   Lab 07/06/25  1146   POCTGLUCOSE 85     Lab Results   Component Value Date    HGBA1C 5.8 (H) 07/06/2025     Lab Results   Component Value Date    WBC 2.68 (L) 07/06/2025    HGB 10.1 (L) 07/06/2025    HCT 32.1 (L) 07/06/2025    MCV 91 07/06/2025     07/06/2025       Scheduled Meds:   clopidogreL  " 75 mg Oral Daily    enoxparin  40 mg Subcutaneous Daily    meropenem IV (PEDS and ADULTS)  1 g Intravenous Q8H    mupirocin   Nasal BID    tamsulosin  0.4 mg Oral QHS     Continuous Infusions:  PRN Meds:.  Current Facility-Administered Medications:     acetaminophen, 650 mg, Oral, Q4H PRN    busPIRone, 5 mg, Oral, BID PRN    dextrose 50%, 12.5 g, Intravenous, PRN    dextrose 50%, 25 g, Intravenous, PRN    glucagon (human recombinant), 1 mg, Intramuscular, PRN    glucose, 16 g, Oral, PRN    glucose, 24 g, Oral, PRN    HYDROcodone-acetaminophen, 1 tablet, Oral, Q6H PRN    insulin aspart U-100, 0-10 Units, Subcutaneous, QID (AC + HS) PRN    melatonin, 6 mg, Oral, Nightly PRN    naloxone, 0.02 mg, Intravenous, PRN    ondansetron, 4 mg, Intravenous, Q8H PRN    prochlorperazine, 5 mg, Intravenous, Q6H PRN    sodium chloride 0.9%, 10 mL, Intravenous, Q12H PRN      Estimated/Assessed Needs    Weight Used For Calorie Calculations: 80.1 kg (176 lb 9.4 oz)  Energy Calorie Requirements (kcal): 8104-4604 kcals (MSJ x 1.2-1.4 (obese))  Energy Need Method: [place]  Protein Requirements: 64-80 g pro (0.8-1 g/kg (obese))  Weight Used For Protein Calculations: 80.1 kg (176 lb 9.4 oz)  Fluid Requirements (mL): 4355-3213 ml  Estimated Fluid Requirement Method: RDA Method  RDA Method (mL): 1752  CHO Requirement: 219-256 g cho (3171-0539 kcals/8)      Nutrition Prescription Ordered    Current Diet Order: Heart Healthy, Consistent carb 2000 kcals    Evaluation of Received Nutrient/Fluid Intake  I/O: (Net since admit):   7/6/25: -2047.2 ml    Energy Calories Required: not meeting needs  Protein Required: not meeting needs  Fluid Required: not meeting needs  Total Fluid Intake (mL): 87.8  Comments: LBM: 7/5, MST 3: unintentional weight loss of 14-23# with low appetite/intake PTA- 7/5  Tolerance: tolerating  % Intake of Estimated Energy Needs: 50 - 75 %  % Meal Intake: 50 - 75 %    PES Statement  Inadequate protein energy intake  related to Catabolic illness, Altered GI function as evidenced by Intake <50% estimated needs  Status: New    Nutrition Risk    Level of Risk/Frequency of Follow-up: low (Follow up x1 weekly)     Monitor and Evaluation    Monitor and Evaluation: Energy intake, Food and beverage intake, Protein intake, Carbohydrate intake, Diet order, Weight, Electrolyte and renal panel, Gastrointestinal profile, Glucose/endocrine profile, Nutrition focused physical findings, Skin     Nutrition Follow-Up    RD Follow-up?: Yes (Follow up x1 weekly)    Lina Mayorga RDN, LDN

## 2025-07-06 NOTE — ASSESSMENT & PLAN NOTE
Patient has Systolic (HFrEF) heart failure that is Chronic. On presentation their CHF was well compensated. Most recent BNP and echo results are listed below.  Recent Labs     07/05/25  1509   BNP 85     Latest ECHO  No results found for this or any previous visit.    Current Heart Failure Medications       Plan  - Monitor strict I&Os and daily weights.    - Place on telemetry  - Low sodium diet  - Place on fluid restriction of 1.5 L.   - Cardiology has not been consulted  - The patient's volume status is at their baseline  - Last known EF 20-25% in 2018, s/p AICD, followed by Cardiology Dr. Dyer as outpatient   - hold home entresto and lasix in setting of sepsis and hypotension, may resume in AM pending BP trends

## 2025-07-06 NOTE — ASSESSMENT & PLAN NOTE
Patient's FSGs are controlled on current medication regimen.  Last A1c reviewed-   Lab Results   Component Value Date    HGBA1C 6.3 07/11/2024     Most recent fingerstick glucose reviewed-   Recent Labs   Lab 07/05/25  1746 07/05/25  2351 07/06/25  0619   POCTGLUCOSE 121* 101 110     Current correctional scale  Medium  Maintain anti-hyperglycemic dose as follows-   Antihyperglycemics (From admission, onward)      Start     Stop Route Frequency Ordered    07/05/25 1630  insulin aspart U-100 pen 0-10 Units         -- SubQ Before meals & nightly PRN 07/05/25 1531        Continue mod dose SSI   Repeat A1c pending   Monitor BG; hypoglycemia protocol   Hold Oral hypoglycemics while patient is in the hospital.

## 2025-07-06 NOTE — ASSESSMENT & PLAN NOTE
Patient's most recent potassium results are listed below.   Recent Labs     07/05/25  1346 07/06/25  0552   K 2.9* 3.7     Plan  - Replete potassium per protocol  - Monitor potassium Daily  - Patient's hypokalemia is resolved

## 2025-07-06 NOTE — ASSESSMENT & PLAN NOTE
Patient has sepsis without organ dysfunction secondary to Urinary Tract Infection. A review of systems was completed. Patient's sepsis is undergoing treatment     Current Antibiotics    meropenem injection 1 g, Every 8 hours (non-standard times), Intravenous    Lactate  Recent Labs   Lab 07/05/25  1446 07/05/25  1751   LACTATE 2.3* 1.9     Culture Data  Blood Cultures   Blood Culture   Date Value Ref Range Status   07/05/2025 No Growth After 6 Hours  Preliminary   07/05/2025 No Growth After 6 Hours  Preliminary      Urine Culture   Urine Culture   Date Value Ref Range Status   06/12/2025 >100,000 cfu/ml Escherichia coli ESBL (A)  Final     Urine Culture, Routine   Date Value Ref Range Status   02/22/2025 ESCHERICHIA COLI ESBL  >100,000 cfu/ml   (A)  Final      mSOFA  MSOFA Total  Min: 0   Min taken time: 07/06/25 1001  Max: 1   Max taken time: 07/06/25 0700    Plan  - Antibiotics as listed above  - Fluid resuscitation as follows:received 1L NS bolus in ED (limited due to hx of CHF). Lactate normalized with IV hydration.   - stop IV fluids   - Follow up culture data  - Vasopressors were not needed  - Cdif pending given reported diarrhea on admission

## 2025-07-06 NOTE — PLAN OF CARE
Free from falls. IV abx maintained. Blood glucose and cardiac monitored. Rodrigues cath in use. No s/s of acute distress.

## 2025-07-06 NOTE — HOSPITAL COURSE
Admitted under  for uti, urinary retention  Urology consulted- recommended Rodrigues catheter, recommended to discharge patient on Rodrigues catheter with outpatient follow up with Urology in 1 week for voiding trail.  Urine culture as of 07/05/2025 positive for ESBL E coli, antibiotics changed to ertapenem per ID recommendations   ID ordered CT abdomen which showed Diffuse bladder wall thickening concerning for cystitis. Air within the bladder thought to reflect instrumentation. Rodrigues catheter and balloon projects centrally within the bladder.Heterogeneous prostate gland with multiple calcifications and scattered clips without discrete abscess identified.  ID recommended IV Ertapenem one gram daily -- Therapy Duration:  7 days ; Estimated end date of IV antibiotics: 07/15;  7/9  Examination done at bedside, appeared alert and oriented x3   Denied acute events overnight   Denied fever, chills, chest pain, short of breath, nausea, vomiting, bowel or bladder issues  Appeared hemodynamically stable, afebrile, no leukocytosis   Cleared for discharge from Urology standpoint, recommended outpatient follow up in 1 week for voiding trail, recommended pain meds, Pyridium/VESIcare p.r.n. for bladder spasms.  Status post PICC line on 07/08;  Cleared for discharge from ID standpoint recommended IV antibiotics/ertapenem with end of treatment 7/15  Ordered home health;  worked on arrangements for home health, home IV infusion   Planning to discharge patient today, emphasized on compliance with medications, outpatient follow up visits, patient/family at bedside expressed understanding, agreed with the plan;  Code status discussed, full code for now

## 2025-07-06 NOTE — PROGRESS NOTES
Formerly named [facility] Medicine  Progress Note    Patient Name: Hector Smith  MRN: [id number]  Patient Class: IP- Inpatient   Admission Date: 7/5/2025  Length of Stay: 1 days  Attending Physician: Viktoriya Luis MD  Primary Care Provider: Ochoa Lane MD        Subjective     Principal Problem:Complicated UTI (urinary tract infection)        HPI:  Patient is a 64-year-old  male with past medical history notable for chronic systolic heart failure (EF 20-25%) status post AICD, type 2 diabetes mellitus, essential hypertension, carotid disease, prostate cancer (on treatment) who presents to the ED on 07/05/2025 for evaluation of difficulty urinating.  Patient reports that he has been having increased pain with urination for the last 3-4 days and over the last 2 days has not been able to urinate.  His wife attempted to straight cath him at home with no success.  He reports lower abdominal pressure/discomfort as well as a few drops of blood at the end of history when he was able to urinate previously.  Also reports 2 day history of watery diarrhea, up to 3 stools per day.  Denies chest pain, shortness of breath, nausea, vomiting, decreased p.o. intake, fevers, chills.  In the ED, initial vital signs:  Patient afebrile, heart rate 101, respiratory rate 16, blood pressure 181/93, sats 99% on room air.  Straight cath x2 was attempted in the ED but was unsuccessful, Urology was consulted and recommended a 20 [place]raini coude which was placed.  Shortly after catheter placement patient became diaphoretic and hypotensive- received 1L NS bolus.  Initial workup:  CBC without leukocytosis, hemoglobin 11.5, platelets 118, potassium 2.9, bicarb 18, anion gap 17, creatinine 1.2, BNP within normal limits.  Lactate 2.3.  UA consistent with UTI.  He received p.o. potassium 20 mEq, IV meropenem based on previous cultures. Hospital medicine consulted for admission    Overview/Hospital Course:  Lactic  acidosis resolved with IV hydration.  Continued on IV meropenem pending urine culture.    Interval History:  No acute events overnight.  Patient reports continued pain with urination, had some leakage around Rodrigues this morning. Reports one episode of soft/loose stool this AM. Denies CP, SOB, cough.     Review of Systems  Objective:     Vital Signs (Most Recent):  Temp: 99.2 °F (37.3 °C) (07/06/25 0835)  Pulse: 74 (07/06/25 0844)  Resp: 16 (07/06/25 0929)  BP: 112/61 (07/06/25 0835)  SpO2: 95 % (07/06/25 0835) Vital Signs (24h Range):  Temp:  [98.2 °F (36.8 °C)-99.5 °F (37.5 °C)] 99.2 °F (37.3 °C)  Pulse:  [] 74  Resp:  [13-21] 16  SpO2:  [81 %-100 %] 95 %  BP: ()/(50-93) 112/61     Weight: 80.1 kg (176 lb 10.1 oz)  Body mass index is 32.31 kg/m².    Intake/Output Summary (Last 24 hours) at 7/6/2025 1004  Last data filed at 7/6/2025 0952  Gross per 24 hour   Intake 207.84 ml   Output 2250 ml   Net -2042.16 ml         Physical Exam  Vitals and nursing note reviewed.   Constitutional:       General: He is not in acute distress.     Appearance: Ill appearance: chronic.   Cardiovascular:      Rate and Rhythm: Normal rate and regular rhythm.      Heart sounds: No murmur heard.  Pulmonary:      Effort: Pulmonary effort is normal.      Breath sounds: Normal breath sounds. No wheezing, rhonchi or rales.   Abdominal:      General: Bowel sounds are normal. There is no distension.      Palpations: Abdomen is soft.      Tenderness: There is no abdominal tenderness.   Genitourinary:     Comments: Rodrigues with cloudy yellow urine   Musculoskeletal:      Right lower leg: No edema.      Left lower leg: No edema.   Neurological:      Mental Status: He is alert and oriented to person, place, and time. Mental status is at baseline.               Significant Labs: All pertinent labs within the past 24 hours have been reviewed.    Significant Imaging: I have reviewed all pertinent imaging results/findings within the past 24  hours.      Assessment & Plan  Complicated UTI (urinary tract infection)  - in setting of known BPH, prostate cancer   - s/p 20Fr Coude placement in the ED   - Continue IV Merrem (has hx of ESBL E. Coli)   - urine culture pending   - Anticipate discharge with batres catheter in place- per urology Dr. Valdez- he will set pt up to see Dr. Lassiter in clinic on discharge     Sepsis  Patient has sepsis without organ dysfunction secondary to Urinary Tract Infection. A review of systems was completed. Patient's sepsis is undergoing treatment     Current Antibiotics    meropenem injection 1 g, Every 8 hours (non-standard times), Intravenous    Lactate  Recent Labs   Lab 07/05/25  1446 07/05/25  1751   LACTATE 2.3* 1.9     Culture Data  Blood Cultures   Blood Culture   Date Value Ref Range Status   07/05/2025 No Growth After 6 Hours  Preliminary   07/05/2025 No Growth After 6 Hours  Preliminary      Urine Culture   Urine Culture   Date Value Ref Range Status   06/12/2025 >100,000 cfu/ml Escherichia coli ESBL (A)  Final     Urine Culture, Routine   Date Value Ref Range Status   02/22/2025 ESCHERICHIA COLI ESBL  >100,000 cfu/ml   (A)  Final      mSOFA  MSOFA Total  Min: 0   Min taken time: 07/06/25 1001  Max: 1   Max taken time: 07/06/25 0700    Plan  - Antibiotics as listed above  - Fluid resuscitation as follows:received 1L NS bolus in ED (limited due to hx of CHF). Lactate normalized with IV hydration.   - stop IV fluids   - Follow up culture data  - Vasopressors were not needed  - Cdif pending given reported diarrhea on admission        Hypokalemia  Patient's most recent potassium results are listed below.   Recent Labs     07/05/25  1346 07/06/25  0552   K 2.9* 3.7     Plan  - Replete potassium per protocol  - Monitor potassium Daily  - Patient's hypokalemia is resolved  Prostate cancer  - followed by Urology Dr. Lassiter and Rad Onc as outapteint   - Currently on Darolutamide  - Hold oral chemo for now in setting of UTI      Chronic systolic (congestive) heart failure  Patient has Systolic (HFrEF) heart failure that is Chronic. On presentation their CHF was well compensated. Most recent BNP and echo results are listed below.  Recent Labs     07/05/25  1509   BNP 85     Latest ECHO  No results found for this or any previous visit.    Current Heart Failure Medications       Plan  - Monitor strict I&Os and daily weights.    - Place on telemetry  - Low sodium diet  - Place on fluid restriction of 1.5 L.   - Cardiology has not been consulted  - The patient's volume status is at their baseline  - Last known EF 20-25% in 2018, s/p AICD, followed by Cardiology Dr. Dyer as outpatient   - hold home entresto and lasix in setting of sepsis and hypotension, may resume in AM pending BP trends       Type 2 diabetes mellitus  Patient's FSGs are controlled on current medication regimen.  Last A1c reviewed-   Lab Results   Component Value Date    HGBA1C 6.3 07/11/2024     Most recent fingerstick glucose reviewed-   Recent Labs   Lab 07/05/25  1746 07/05/25  2351 07/06/25  0619   POCTGLUCOSE 121* 101 110     Current correctional scale  Medium  Maintain anti-hyperglycemic dose as follows-   Antihyperglycemics (From admission, onward)      Start     Stop Route Frequency Ordered    07/05/25 1630  insulin aspart U-100 pen 0-10 Units         -- SubQ Before meals & nightly PRN 07/05/25 1531        Continue mod dose SSI   Repeat A1c pending   Monitor BG; hypoglycemia protocol   Hold Oral hypoglycemics while patient is in the hospital.  PERNELL (obstructive sleep apnea)  Not on CPAP   Wean Supplemental O2- maintain sats > 90%     VTE Risk Mitigation (From admission, onward)           Ordered     enoxaparin injection 40 mg  Daily         07/05/25 1531     IP VTE HIGH RISK PATIENT  Once         07/05/25 1531     Place sequential compression device  Until discontinued         07/05/25 1531                    Discharge Planning   BONITA:      Code Status: Full Code    Medical Readiness for Discharge Date:                            Viktoriya Luis MD  Department of Hospital Medicine   '[facility]

## 2025-07-07 ENCOUNTER — TELEPHONE (OUTPATIENT)
Dept: UROLOGY | Facility: CLINIC | Age: 65
End: 2025-07-07
Payer: MEDICARE

## 2025-07-07 LAB
ABSOLUTE EOSINOPHIL (OHS): 0.01 K/UL
ABSOLUTE MONOCYTE (OHS): 0.37 K/UL (ref 0.3–1)
ABSOLUTE NEUTROPHIL COUNT (OHS): 1.87 K/UL (ref 1.8–7.7)
ANION GAP (OHS): 5 MMOL/L (ref 8–16)
BASOPHILS # BLD AUTO: 0.01 K/UL
BASOPHILS NFR BLD AUTO: 0.4 %
BUN SERPL-MCNC: 11 MG/DL (ref 8–23)
CALCIUM SERPL-MCNC: 8.8 MG/DL (ref 8.7–10.5)
CHLORIDE SERPL-SCNC: 103 MMOL/L (ref 95–110)
CO2 SERPL-SCNC: 28 MMOL/L (ref 23–29)
CREAT SERPL-MCNC: 1 MG/DL (ref 0.5–1.4)
ERYTHROCYTE [DISTWIDTH] IN BLOOD BY AUTOMATED COUNT: 17.1 % (ref 11.5–14.5)
GFR SERPLBLD CREATININE-BSD FMLA CKD-EPI: >60 ML/MIN/1.73/M2
GLUCOSE SERPL-MCNC: 101 MG/DL (ref 70–110)
HCT VFR BLD AUTO: 32.7 % (ref 40–54)
HGB BLD-MCNC: 10.4 GM/DL (ref 14–18)
IMM GRANULOCYTES # BLD AUTO: 0.03 K/UL (ref 0–0.04)
IMM GRANULOCYTES NFR BLD AUTO: 1.2 % (ref 0–0.5)
LYMPHOCYTES # BLD AUTO: 0.29 K/UL (ref 1–4.8)
MAGNESIUM SERPL-MCNC: 1.9 MG/DL (ref 1.6–2.6)
MCH RBC QN AUTO: 29 PG (ref 27–31)
MCHC RBC AUTO-ENTMCNC: 31.8 G/DL (ref 32–36)
MCV RBC AUTO: 91 FL (ref 82–98)
NUCLEATED RBC (/100WBC) (OHS): 0 /100 WBC
PHOSPHATE SERPL-MCNC: 3 MG/DL (ref 2.7–4.5)
PLATELET # BLD AUTO: 152 K/UL (ref 150–450)
PMV BLD AUTO: 9.7 FL (ref 9.2–12.9)
POCT GLUCOSE: 104 MG/DL (ref 70–110)
POCT GLUCOSE: 123 MG/DL (ref 70–110)
POCT GLUCOSE: 147 MG/DL (ref 70–110)
POCT GLUCOSE: 147 MG/DL (ref 70–110)
POCT GLUCOSE: 166 MG/DL (ref 70–110)
POTASSIUM SERPL-SCNC: 4 MMOL/L (ref 3.5–5.1)
RBC # BLD AUTO: 3.59 M/UL (ref 4.6–6.2)
RELATIVE EOSINOPHIL (OHS): 0.4 %
RELATIVE LYMPHOCYTE (OHS): 11.2 % (ref 18–48)
RELATIVE MONOCYTE (OHS): 14.3 % (ref 4–15)
RELATIVE NEUTROPHIL (OHS): 72.5 % (ref 38–73)
SODIUM SERPL-SCNC: 136 MMOL/L (ref 136–145)
WBC # BLD AUTO: 2.58 K/UL (ref 3.9–12.7)

## 2025-07-07 PROCEDURE — 25000003 PHARM REV CODE 250: Performed by: INTERNAL MEDICINE

## 2025-07-07 PROCEDURE — 25000003 PHARM REV CODE 250: Performed by: STUDENT IN AN ORGANIZED HEALTH CARE EDUCATION/TRAINING PROGRAM

## 2025-07-07 PROCEDURE — 82947 ASSAY GLUCOSE BLOOD QUANT: CPT | Performed by: INTERNAL MEDICINE

## 2025-07-07 PROCEDURE — 85025 COMPLETE CBC W/AUTO DIFF WBC: CPT | Performed by: INTERNAL MEDICINE

## 2025-07-07 PROCEDURE — 63600175 PHARM REV CODE 636 W HCPCS: Performed by: EMERGENCY MEDICINE

## 2025-07-07 PROCEDURE — 36415 COLL VENOUS BLD VENIPUNCTURE: CPT | Performed by: INTERNAL MEDICINE

## 2025-07-07 PROCEDURE — 99223 1ST HOSP IP/OBS HIGH 75: CPT | Mod: NSCH,,, | Performed by: INTERNAL MEDICINE

## 2025-07-07 PROCEDURE — 99232 SBSQ HOSP IP/OBS MODERATE 35: CPT | Mod: ,,, | Performed by: UROLOGY

## 2025-07-07 PROCEDURE — 84100 ASSAY OF PHOSPHORUS: CPT | Performed by: INTERNAL MEDICINE

## 2025-07-07 PROCEDURE — 27000207 HC ISOLATION

## 2025-07-07 PROCEDURE — 83735 ASSAY OF MAGNESIUM: CPT | Performed by: INTERNAL MEDICINE

## 2025-07-07 PROCEDURE — 63600175 PHARM REV CODE 636 W HCPCS: Performed by: INTERNAL MEDICINE

## 2025-07-07 PROCEDURE — 21400001 HC TELEMETRY ROOM

## 2025-07-07 RX ORDER — OXYCODONE AND ACETAMINOPHEN 5; 325 MG/1; MG/1
1 TABLET ORAL EVERY 4 HOURS PRN
Qty: 15 TABLET | Refills: 0 | Status: SHIPPED | OUTPATIENT
Start: 2025-07-07

## 2025-07-07 RX ORDER — PHENAZOPYRIDINE HYDROCHLORIDE 100 MG/1
100 TABLET, FILM COATED ORAL
Status: DISCONTINUED | OUTPATIENT
Start: 2025-07-07 | End: 2025-07-09 | Stop reason: HOSPADM

## 2025-07-07 RX ORDER — PHENAZOPYRIDINE HYDROCHLORIDE 200 MG/1
200 TABLET, FILM COATED ORAL 3 TIMES DAILY PRN
Qty: 15 TABLET | Refills: 0 | Status: SHIPPED | OUTPATIENT
Start: 2025-07-07 | End: 2025-07-09

## 2025-07-07 RX ORDER — SOLIFENACIN SUCCINATE 5 MG/1
5 TABLET, FILM COATED ORAL DAILY
Qty: 30 TABLET | Refills: 0 | Status: SHIPPED | OUTPATIENT
Start: 2025-07-07 | End: 2026-07-07

## 2025-07-07 RX ADMIN — ENOXAPARIN SODIUM 40 MG: 40 INJECTION SUBCUTANEOUS at 05:07

## 2025-07-07 RX ADMIN — MEROPENEM 1 G: 1 INJECTION INTRAVENOUS at 05:07

## 2025-07-07 RX ADMIN — MUPIROCIN: 20 OINTMENT TOPICAL at 09:07

## 2025-07-07 RX ADMIN — HYDROCODONE BITARTRATE AND ACETAMINOPHEN 1 TABLET: 5; 325 TABLET ORAL at 05:07

## 2025-07-07 RX ADMIN — HYDROCODONE BITARTRATE AND ACETAMINOPHEN 1 TABLET: 5; 325 TABLET ORAL at 11:07

## 2025-07-07 RX ADMIN — PHENAZOPYRIDINE 100 MG: 100 TABLET ORAL at 03:07

## 2025-07-07 RX ADMIN — CLOPIDOGREL 75 MG: 75 TABLET ORAL at 10:07

## 2025-07-07 RX ADMIN — TAMSULOSIN HYDROCHLORIDE 0.4 MG: 0.4 CAPSULE ORAL at 09:07

## 2025-07-07 RX ADMIN — MUPIROCIN: 20 OINTMENT TOPICAL at 10:07

## 2025-07-07 RX ADMIN — INSULIN ASPART 1 UNITS: 100 INJECTION, SOLUTION INTRAVENOUS; SUBCUTANEOUS at 10:07

## 2025-07-07 RX ADMIN — MEROPENEM 1 G: 1 INJECTION INTRAVENOUS at 02:07

## 2025-07-07 RX ADMIN — MEROPENEM 1 G: 1 INJECTION INTRAVENOUS at 10:07

## 2025-07-07 RX ADMIN — HYDROCODONE BITARTRATE AND ACETAMINOPHEN 1 TABLET: 5; 325 TABLET ORAL at 10:07

## 2025-07-07 NOTE — PROGRESS NOTES
Good UOP and batres is positioned against the bladder neck.  Most likely spasms and no need to change the batres.  Will prescribe something for pain and urgency with spasms.  Will arrange for outpatient voiding trial next week.  Clear from our standpoint for d/c home today.  Strict constipation control as well.

## 2025-07-07 NOTE — ASSESSMENT & PLAN NOTE
Patient's most recent potassium results are listed below.   Recent Labs     07/05/25  1346 07/06/25  0552 07/07/25  0555   K 2.9* 3.7 4.0     Plan  - Replete potassium per protocol  - Monitor potassium Daily  - Patient's hypokalemia is resolved

## 2025-07-07 NOTE — PLAN OF CARE
Discussed poc with pt, pt verbalized understanding    Purposeful rounding every 2hours    VS monitored for need of PRN interventions   Cardiac monitoring in use, pt is NSR, tele monitor # [id number]  Blood glucose monitoring   Fall precautions in place, remains injury free  Pt denies c/o pain or n/v   Pain and nausea under control with PRN meds    Accurate I&Os  Abx. Given as ordered  Bed locked at lowest position  Call light within reach    Chart check complete  Will cont with POC

## 2025-07-07 NOTE — PROGRESS NOTES
[facility] Medicine  Progress Note    Patient Name: Hector Smith  MRN: [id number]  Patient Class: IP- Inpatient   Admission Date: 7/5/2025  Length of Stay: 2 days  Attending Physician: Francisco Ayala,*  Primary Care Provider: Ochoa Lane MD        Subjective     Principal Problem:Complicated UTI (urinary tract infection)        HPI:  Patient is a 64-year-old  male with past medical history notable for chronic systolic heart failure (EF 20-25%) status post AICD, type 2 diabetes mellitus, essential hypertension, carotid disease, prostate cancer (on treatment) who presents to the ED on 07/05/2025 for evaluation of difficulty urinating.  Patient reports that he has been having increased pain with urination for the last 3-4 days and over the last 2 days has not been able to urinate.  His wife attempted to straight cath him at home with no success.  He reports lower abdominal pressure/discomfort as well as a few drops of blood at the end of history when he was able to urinate previously.  Also reports 2 day history of watery diarrhea, up to 3 stools per day.  Denies chest pain, shortness of breath, nausea, vomiting, decreased p.o. intake, fevers, chills.  In the ED, initial vital signs:  Patient afebrile, heart rate 101, respiratory rate 16, blood pressure 181/93, sats 99% on room air.  Straight cath x2 was attempted in the ED but was unsuccessful, Urology was consulted and recommended a 20 [place]zeri coude which was placed.  Shortly after catheter placement patient became diaphoretic and hypotensive- received 1L NS bolus.  Initial workup:  CBC without leukocytosis, hemoglobin 11.5, platelets 118, potassium 2.9, bicarb 18, anion gap 17, creatinine 1.2, BNP within normal limits.  Lactate 2.3.  UA consistent with UTI.  He received p.o. potassium 20 mEq, IV meropenem based on previous cultures. Hospital medicine consulted for admission    Overview/Hospital Course:  Lactic  acidosis resolved with IV hydration.  Continued on IV meropenem pending urine culture.    Interval History:     No acute events overnight   Has been evaluated by Urology, stated Rodrigues in appropriate position.  Recommended to consider discharge with Rodrigues.  Ordered p.r.n. medicines for bladder spasms   Pending final urine cultures, ID recommendations       Review of Systems  Objective:     Vital Signs (Most Recent):  Temp: 99.1 °F (37.3 °C) (07/07/25 1139)  Pulse: 75 (07/07/25 1139)  Resp: 20 (07/07/25 1139)  BP: 119/68 (07/07/25 1139)  SpO2: 97 % (07/07/25 1139) Vital Signs (24h Range):  Temp:  [98 °F (36.7 °C)-99.1 °F (37.3 °C)] 99.1 °F (37.3 °C)  Pulse:  [66-75] 75  Resp:  [16-20] 20  SpO2:  [94 %-98 %] 97 %  BP: (102-135)/(55-81) 119/68     Weight: 83.8 kg (184 lb 11.9 oz)  Body mass index is 33.79 kg/m².    Intake/Output Summary (Last 24 hours) at 7/7/2025 1313  Last data filed at 7/7/2025 0950  Gross per 24 hour   Intake 150 ml   Output 980 ml   Net -830 ml         Physical Exam      Constitutional:       General: He is not in acute distress.     Appearance: Ill appearance: chronic.   Cardiovascular:      Rate and Rhythm: Normal rate and regular rhythm.      Heart sounds: No murmur heard.  Pulmonary:      Effort: Pulmonary effort is normal.      Breath sounds: Normal breath sounds. No wheezing, rhonchi or rales.   Abdominal:      General: Bowel sounds are normal. There is no distension.      Palpations: Abdomen is soft.      Tenderness: There is no abdominal tenderness.   Genitourinary:     Comments: Rodrigues with cloudy yellow urine   Musculoskeletal:      Right lower leg: No edema.      Left lower leg: No edema.   Neurological:      Mental Status: He is alert and oriented to person, place, and time. Mental status is at baseline.     Significant Labs: All pertinent labs within the past 24 hours have been reviewed.  CBC:   Recent Labs   Lab 07/06/25  0552 07/07/25  0556   WBC 2.68* 2.58*   HGB 10.1* 10.4*   HCT  32.1* 32.7*    152     CMP:   Recent Labs   Lab 07/06/25  0552 07/07/25  0555    136   K 3.7 4.0    103   CO2 25 28   GLU 97 101   BUN 11 11   CREATININE 1.1 1.0   CALCIUM 8.7 8.8   ANIONGAP 8 5*       Significant Imaging:   Imaging Results              X-Ray Chest AP Portable (Final result)  Result time 07/05/25 15:19:59      Final result by Pelon Stanford MD (07/05/25 15:19:59)                   Impression:     No acute process.    Finalized on: 7/5/2025 3:19 PM By:  Pelon Stanford MD  [facility]# [id number]      2025-07-05 15:22:04.085     [facility]               Narrative:    EXAM:  XR CHEST AP PORTABLE    CLINICAL INDICATION: Weakness.    COMPARISON STUDY:  12/14/2024.    FINDINGS: No change.  Normal size heart.  Left chest wall AICD with intact leads.  No vascular congestion.  Lungs are clear.  Multilevel posterior cervical spinal fusion.  Small calcium deposit left rotator cuff footprint.                                           Assessment & Plan  Complicated UTI (urinary tract infection)  - in setting of known BPH, prostate cancer   - s/p 20Fr Coude placement in the ED   - Continue IV Merrem (has hx of ESBL E. Coli)   - urine culture pending   - Anticipate discharge with batres catheter in place- per urology Dr. Valdez- he will set pt up to see Dr. Lassiter in clinic on discharge     Sepsis  Patient has sepsis without organ dysfunction secondary to Urinary Tract Infection. A review of systems was completed. Patient's sepsis is undergoing treatment     Current Antibiotics    meropenem injection 1 g, Every 8 hours (non-standard times), Intravenous    Lactate  Recent Labs   Lab 07/05/25  1446 07/05/25  1751   LACTATE 2.3* 1.9     Culture Data  Blood Cultures   Blood Culture   Date Value Ref Range Status   07/05/2025 No Growth After 36 Hours  Preliminary   07/05/2025 No Growth After 36 Hours  Preliminary      Urine Culture   Urine Culture   Date Value Ref Range Status   07/05/2025 >100,000 cfu/ml  Gram-negative Rods (A)  Preliminary     Comment:     Identification and susceptibility pending     Urine Culture, Routine   Date Value Ref Range Status   02/22/2025 ESCHERICHIA COLI ESBL  >100,000 cfu/ml   (A)  Final      mSOFA  MSOFA Total  Min: 0   Min taken time: 07/07/25 1401  Max: 0   Max taken time: 07/07/25 1401    Plan  - Antibiotics as listed above  - Fluid resuscitation as follows:received 1L NS bolus in ED (limited due to hx of CHF). Lactate normalized with IV hydration.   - stop IV fluids   - Follow up culture data  - Vasopressors were not needed  - Cdif pending given reported diarrhea on admission        Hypokalemia  Patient's most recent potassium results are listed below.   Recent Labs     07/05/25  1346 07/06/25  0552 07/07/25  0555   K 2.9* 3.7 4.0     Plan  - Replete potassium per protocol  - Monitor potassium Daily  - Patient's hypokalemia is resolved  Prostate cancer  - followed by Urology Dr. Lassiter and Rad Onc as outapteint   - Currently on Darolutamide  - Hold oral chemo for now in setting of UTI     Chronic systolic (congestive) heart failure  Patient has Systolic (HFrEF) heart failure that is Chronic. On presentation their CHF was well compensated. Most recent BNP and echo results are listed below.  Recent Labs     07/05/25  1509   BNP 85     Latest ECHO  No results found for this or any previous visit.    Current Heart Failure Medications       Plan  - Monitor strict I&Os and daily weights.    - Place on telemetry  - Low sodium diet  - Place on fluid restriction of 1.5 L.   - Cardiology has not been consulted  - The patient's volume status is at their baseline  - Last known EF 20-25% in 2018, s/p AICD, followed by Cardiology Dr. Dyer as outpatient   - hold home entresto and lasix in setting of sepsis and hypotension, may resume in AM pending BP trends       Type 2 diabetes mellitus  Patient's FSGs are controlled on current medication regimen.  Last A1c reviewed-   Lab Results    Component Value Date    HGBA1C 5.8 (H) 07/06/2025     Most recent fingerstick glucose reviewed-   Recent Labs   Lab 07/06/25  1735 07/06/25  2218 07/07/25  0618 07/07/25  1137   POCTGLUCOSE 169* 94 104 147*     Current correctional scale  Medium  Maintain anti-hyperglycemic dose as follows-   Antihyperglycemics (From admission, onward)      Start     Stop Route Frequency Ordered    07/05/25 1630  insulin aspart U-100 pen 0-10 Units         -- SubQ Before meals & nightly PRN 07/05/25 1531        Continue mod dose SSI   Repeat A1c pending   Monitor BG; hypoglycemia protocol   Hold Oral hypoglycemics while patient is in the hospital.  PERNELL (obstructive sleep apnea)  Not on CPAP   Wean Supplemental O2- maintain sats > 90%     VTE Risk Mitigation (From admission, onward)           Ordered     enoxaparin injection 40 mg  Daily         07/05/25 1531     IP VTE HIGH RISK PATIENT  Once         07/05/25 1531     Place sequential compression device  Until discontinued         07/05/25 1531                    Discharge Planning   BONITA: 7/10/2025     Code Status: Full Code   Medical Readiness for Discharge Date:   Discharge Plan A: Home with family                        Sergioedita Juwan Ayala MD  Department of Hospital Medicine   [facility]

## 2025-07-07 NOTE — ASSESSMENT & PLAN NOTE
Patient's FSGs are controlled on current medication regimen.  Last A1c reviewed-   Lab Results   Component Value Date    HGBA1C 5.8 (H) 07/06/2025     Most recent fingerstick glucose reviewed-   Recent Labs   Lab 07/06/25  1735 07/06/25  2218 07/07/25  0618 07/07/25  1137   POCTGLUCOSE 169* 94 104 147*     Current correctional scale  Medium  Maintain anti-hyperglycemic dose as follows-   Antihyperglycemics (From admission, onward)      Start     Stop Route Frequency Ordered    07/05/25 1630  insulin aspart U-100 pen 0-10 Units         -- SubQ Before meals & nightly PRN 07/05/25 1531        Continue mod dose SSI   Repeat A1c pending   Monitor BG; hypoglycemia protocol   Hold Oral hypoglycemics while patient is in the hospital.

## 2025-07-07 NOTE — ASSESSMENT & PLAN NOTE
Patient has sepsis without organ dysfunction secondary to Urinary Tract Infection. A review of systems was completed. Patient's sepsis is undergoing treatment     Current Antibiotics    meropenem injection 1 g, Every 8 hours (non-standard times), Intravenous    Lactate  Recent Labs   Lab 07/05/25  1446 07/05/25  1751   LACTATE 2.3* 1.9     Culture Data  Blood Cultures   Blood Culture   Date Value Ref Range Status   07/05/2025 No Growth After 36 Hours  Preliminary   07/05/2025 No Growth After 36 Hours  Preliminary      Urine Culture   Urine Culture   Date Value Ref Range Status   07/05/2025 >100,000 cfu/ml Gram-negative Rods (A)  Preliminary     Comment:     Identification and susceptibility pending     Urine Culture, Routine   Date Value Ref Range Status   02/22/2025 ESCHERICHIA COLI ESBL  >100,000 cfu/ml   (A)  Final      mSOFA  MSOFA Total  Min: 0   Min taken time: 07/07/25 1401  Max: 0   Max taken time: 07/07/25 1401    Plan  - Antibiotics as listed above  - Fluid resuscitation as follows:received 1L NS bolus in ED (limited due to hx of CHF). Lactate normalized with IV hydration.   - stop IV fluids   - Follow up culture data  - Vasopressors were not needed  - Cdif pending given reported diarrhea on admission

## 2025-07-07 NOTE — TELEPHONE ENCOUNTER
----- Message from Ron Lassiter MD sent at 7/7/2025  1:38 PM CDT -----  See me in 3 weeks, come back Monday am though for a voiding trial as a nurse visit.  To be d/c home today.

## 2025-07-07 NOTE — PLAN OF CARE
Discussed poc with pt, pt verbalized understanding     Purposeful rounding every 2hours     VS wnl  Cardiac monitoring in use, pt is NSR, tele monitor #[id number]  Fall precautions in place, remains injury free  Contact precautions in place.      Accurate I&Os  Bed locked at lowest position  Call light within reach     Chart check complete  Will cont with POC

## 2025-07-07 NOTE — SUBJECTIVE & OBJECTIVE
Interval History:     No acute events overnight   Has been evaluated by Urology, stated Rodrigues in appropriate position.  Recommended to consider discharge with Rodrigues.  Ordered p.r.n. medicines for bladder spasms   Pending final urine cultures, ID recommendations       Review of Systems  Objective:     Vital Signs (Most Recent):  Temp: 99.1 °F (37.3 °C) (07/07/25 1139)  Pulse: 75 (07/07/25 1139)  Resp: 20 (07/07/25 1139)  BP: 119/68 (07/07/25 1139)  SpO2: 97 % (07/07/25 1139) Vital Signs (24h Range):  Temp:  [98 °F (36.7 °C)-99.1 °F (37.3 °C)] 99.1 °F (37.3 °C)  Pulse:  [66-75] 75  Resp:  [16-20] 20  SpO2:  [94 %-98 %] 97 %  BP: (102-135)/(55-81) 119/68     Weight: 83.8 kg (184 lb 11.9 oz)  Body mass index is 33.79 kg/m².    Intake/Output Summary (Last 24 hours) at 7/7/2025 1313  Last data filed at 7/7/2025 0950  Gross per 24 hour   Intake 150 ml   Output 980 ml   Net -830 ml         Physical Exam      Constitutional:       General: He is not in acute distress.     Appearance: Ill appearance: chronic.   Cardiovascular:      Rate and Rhythm: Normal rate and regular rhythm.      Heart sounds: No murmur heard.  Pulmonary:      Effort: Pulmonary effort is normal.      Breath sounds: Normal breath sounds. No wheezing, rhonchi or rales.   Abdominal:      General: Bowel sounds are normal. There is no distension.      Palpations: Abdomen is soft.      Tenderness: There is no abdominal tenderness.   Genitourinary:     Comments: Rodrigues with cloudy yellow urine   Musculoskeletal:      Right lower leg: No edema.      Left lower leg: No edema.   Neurological:      Mental Status: He is alert and oriented to person, place, and time. Mental status is at baseline.     Significant Labs: All pertinent labs within the past 24 hours have been reviewed.  CBC:   Recent Labs   Lab 07/06/25  0552 07/07/25  0556   WBC 2.68* 2.58*   HGB 10.1* 10.4*   HCT 32.1* 32.7*    152     CMP:   Recent Labs   Lab 07/06/25  0552 07/07/25  0555   NA  137 136   K 3.7 4.0    103   CO2 25 28   GLU 97 101   BUN 11 11   CREATININE 1.1 1.0   CALCIUM 8.7 8.8   ANIONGAP 8 5*       Significant Imaging:   Imaging Results              X-Ray Chest AP Portable (Final result)  Result time 07/05/25 15:19:59      Final result by Pelon Stanford MD (07/05/25 15:19:59)                   Impression:     No acute process.    Finalized on: 7/5/2025 3:19 PM By:  Pelon Stanford MD  [facility]# [id number]      2025-07-05 15:22:04.085     [facility]               Narrative:    EXAM:  XR CHEST AP PORTABLE    CLINICAL INDICATION: Weakness.    COMPARISON STUDY:  12/14/2024.    FINDINGS: No change.  Normal size heart.  Left chest wall AICD with intact leads.  No vascular congestion.  Lungs are clear.  Multilevel posterior cervical spinal fusion.  Small calcium deposit left rotator cuff footprint.

## 2025-07-07 NOTE — PLAN OF CARE
07/07/25 1138   Rounds   Attendance Provider;Nurse ;Charge nurse;Physical therapist;Occupational therapist   Discharge Plan A Home with family   Why the patient remains in the hospital Requires continued medical care   Transition of Care Barriers None     Cultures pending, per MD.

## 2025-07-08 LAB
ABSOLUTE EOSINOPHIL (OHS): 0.04 K/UL
ABSOLUTE MONOCYTE (OHS): 0.53 K/UL (ref 0.3–1)
ABSOLUTE NEUTROPHIL COUNT (OHS): 1.63 K/UL (ref 1.8–7.7)
ANION GAP (OHS): 9 MMOL/L (ref 8–16)
BACTERIA UR CULT: ABNORMAL
BASOPHILS # BLD AUTO: 0.01 K/UL
BASOPHILS NFR BLD AUTO: 0.4 %
BUN SERPL-MCNC: 9 MG/DL (ref 8–23)
CALCIUM SERPL-MCNC: 9.6 MG/DL (ref 8.7–10.5)
CHLORIDE SERPL-SCNC: 104 MMOL/L (ref 95–110)
CO2 SERPL-SCNC: 26 MMOL/L (ref 23–29)
CREAT SERPL-MCNC: 1 MG/DL (ref 0.5–1.4)
CREAT SERPL-MCNC: 1 MG/DL (ref 0.5–1.4)
ERYTHROCYTE [DISTWIDTH] IN BLOOD BY AUTOMATED COUNT: 17.3 % (ref 11.5–14.5)
GFR SERPLBLD CREATININE-BSD FMLA CKD-EPI: >60 ML/MIN/1.73/M2
GFR SERPLBLD CREATININE-BSD FMLA CKD-EPI: >60 ML/MIN/1.73/M2
GLUCOSE SERPL-MCNC: 107 MG/DL (ref 70–110)
HCT VFR BLD AUTO: 35.3 % (ref 40–54)
HGB BLD-MCNC: 11 GM/DL (ref 14–18)
IMM GRANULOCYTES # BLD AUTO: 0.02 K/UL (ref 0–0.04)
IMM GRANULOCYTES NFR BLD AUTO: 0.8 % (ref 0–0.5)
LYMPHOCYTES # BLD AUTO: 0.24 K/UL (ref 1–4.8)
MAGNESIUM SERPL-MCNC: 1.9 MG/DL (ref 1.6–2.6)
MCH RBC QN AUTO: 28.6 PG (ref 27–31)
MCHC RBC AUTO-ENTMCNC: 31.2 G/DL (ref 32–36)
MCV RBC AUTO: 92 FL (ref 82–98)
NUCLEATED RBC (/100WBC) (OHS): 0 /100 WBC
PHOSPHATE SERPL-MCNC: 2.8 MG/DL (ref 2.7–4.5)
PLATELET # BLD AUTO: 162 K/UL (ref 150–450)
PMV BLD AUTO: 9.4 FL (ref 9.2–12.9)
POCT GLUCOSE: 109 MG/DL (ref 70–110)
POCT GLUCOSE: 118 MG/DL (ref 70–110)
POCT GLUCOSE: 127 MG/DL (ref 70–110)
POCT GLUCOSE: 150 MG/DL (ref 70–110)
POCT GLUCOSE: 167 MG/DL (ref 70–110)
POTASSIUM SERPL-SCNC: 3.8 MMOL/L (ref 3.5–5.1)
RBC # BLD AUTO: 3.84 M/UL (ref 4.6–6.2)
RELATIVE EOSINOPHIL (OHS): 1.6 %
RELATIVE LYMPHOCYTE (OHS): 9.7 % (ref 18–48)
RELATIVE MONOCYTE (OHS): 21.5 % (ref 4–15)
RELATIVE NEUTROPHIL (OHS): 66 % (ref 38–73)
SODIUM SERPL-SCNC: 139 MMOL/L (ref 136–145)
WBC # BLD AUTO: 2.47 K/UL (ref 3.9–12.7)

## 2025-07-08 PROCEDURE — 25000003 PHARM REV CODE 250: Performed by: INTERNAL MEDICINE

## 2025-07-08 PROCEDURE — 63600175 PHARM REV CODE 636 W HCPCS: Performed by: INTERNAL MEDICINE

## 2025-07-08 PROCEDURE — 85025 COMPLETE CBC W/AUTO DIFF WBC: CPT | Performed by: INTERNAL MEDICINE

## 2025-07-08 PROCEDURE — 25000003 PHARM REV CODE 250: Performed by: STUDENT IN AN ORGANIZED HEALTH CARE EDUCATION/TRAINING PROGRAM

## 2025-07-08 PROCEDURE — 80048 BASIC METABOLIC PNL TOTAL CA: CPT | Performed by: INTERNAL MEDICINE

## 2025-07-08 PROCEDURE — 36415 COLL VENOUS BLD VENIPUNCTURE: CPT | Performed by: INTERNAL MEDICINE

## 2025-07-08 PROCEDURE — 99232 SBSQ HOSP IP/OBS MODERATE 35: CPT | Mod: NSCH,,, | Performed by: INTERNAL MEDICINE

## 2025-07-08 PROCEDURE — 63600175 PHARM REV CODE 636 W HCPCS: Performed by: EMERGENCY MEDICINE

## 2025-07-08 PROCEDURE — 21400001 HC TELEMETRY ROOM

## 2025-07-08 PROCEDURE — 36410 VNPNXR 3YR/> PHY/QHP DX/THER: CPT

## 2025-07-08 PROCEDURE — A9698 NON-RAD CONTRAST MATERIALNOC: HCPCS | Performed by: STUDENT IN AN ORGANIZED HEALTH CARE EDUCATION/TRAINING PROGRAM

## 2025-07-08 PROCEDURE — C1751 CATH, INF, PER/CENT/MIDLINE: HCPCS

## 2025-07-08 PROCEDURE — 25500020 PHARM REV CODE 255: Performed by: STUDENT IN AN ORGANIZED HEALTH CARE EDUCATION/TRAINING PROGRAM

## 2025-07-08 PROCEDURE — 27000207 HC ISOLATION

## 2025-07-08 PROCEDURE — 82565 ASSAY OF CREATININE: CPT | Performed by: INTERNAL MEDICINE

## 2025-07-08 PROCEDURE — 84100 ASSAY OF PHOSPHORUS: CPT | Performed by: INTERNAL MEDICINE

## 2025-07-08 PROCEDURE — 83735 ASSAY OF MAGNESIUM: CPT | Performed by: INTERNAL MEDICINE

## 2025-07-08 RX ADMIN — HYDROCODONE BITARTRATE AND ACETAMINOPHEN 1 TABLET: 5; 325 TABLET ORAL at 04:07

## 2025-07-08 RX ADMIN — INSULIN ASPART 2 UNITS: 100 INJECTION, SOLUTION INTRAVENOUS; SUBCUTANEOUS at 09:07

## 2025-07-08 RX ADMIN — HYDROCODONE BITARTRATE AND ACETAMINOPHEN 1 TABLET: 5; 325 TABLET ORAL at 10:07

## 2025-07-08 RX ADMIN — MEROPENEM 1 G: 1 INJECTION INTRAVENOUS at 02:07

## 2025-07-08 RX ADMIN — ERTAPENEM 1 G: 1 INJECTION INTRAMUSCULAR; INTRAVENOUS at 04:07

## 2025-07-08 RX ADMIN — ENOXAPARIN SODIUM 40 MG: 40 INJECTION SUBCUTANEOUS at 04:07

## 2025-07-08 RX ADMIN — IOHEXOL 1000 ML: 9 SOLUTION ORAL at 01:07

## 2025-07-08 RX ADMIN — MUPIROCIN: 20 OINTMENT TOPICAL at 10:07

## 2025-07-08 RX ADMIN — CLOPIDOGREL 75 MG: 75 TABLET ORAL at 09:07

## 2025-07-08 RX ADMIN — IOHEXOL 100 ML: 350 INJECTION, SOLUTION INTRAVENOUS at 01:07

## 2025-07-08 RX ADMIN — MUPIROCIN: 20 OINTMENT TOPICAL at 09:07

## 2025-07-08 RX ADMIN — MEROPENEM 1 G: 1 INJECTION INTRAVENOUS at 09:07

## 2025-07-08 RX ADMIN — TAMSULOSIN HYDROCHLORIDE 0.4 MG: 0.4 CAPSULE ORAL at 10:07

## 2025-07-08 RX ADMIN — PHENAZOPYRIDINE 100 MG: 100 TABLET ORAL at 05:07

## 2025-07-08 NOTE — PROGRESS NOTES
[facility] Medicine  Progress Note    Patient Name: Hector Smith  MRN: [id number]  Patient Class: IP- Inpatient   Admission Date: 7/5/2025  Length of Stay: 3 days  Attending Physician: Francisco Ayala,*  Primary Care Provider: Ochoa Lane MD        Subjective     Principal Problem:Complicated UTI (urinary tract infection)        HPI:  Patient is a 64-year-old  male with past medical history notable for chronic systolic heart failure (EF 20-25%) status post AICD, type 2 diabetes mellitus, essential hypertension, carotid disease, prostate cancer (on treatment) who presents to the ED on 07/05/2025 for evaluation of difficulty urinating.  Patient reports that he has been having increased pain with urination for the last 3-4 days and over the last 2 days has not been able to urinate.  His wife attempted to straight cath him at home with no success.  He reports lower abdominal pressure/discomfort as well as a few drops of blood at the end of history when he was able to urinate previously.  Also reports 2 day history of watery diarrhea, up to 3 stools per day.  Denies chest pain, shortness of breath, nausea, vomiting, decreased p.o. intake, fevers, chills.  In the ED, initial vital signs:  Patient afebrile, heart rate 101, respiratory rate 16, blood pressure 181/93, sats 99% on room air.  Straight cath x2 was attempted in the ED but was unsuccessful, Urology was consulted and recommended a 20 Urdu coude which was placed.  Shortly after catheter placement patient became diaphoretic and hypotensive- received 1L NS bolus.  Initial workup:  CBC without leukocytosis, hemoglobin 11.5, platelets 118, potassium 2.9, bicarb 18, anion gap 17, creatinine 1.2, BNP within normal limits.  Lactate 2.3.  UA consistent with UTI.  He received p.o. potassium 20 mEq, IV meropenem based on previous cultures. Hospital medicine consulted for admission    Overview/Hospital Course:  Lactic  acidosis resolved with IV hydration.    Urine culture as of 07/05/2025 positive for ESBL E coli, antibiotics changed to ertapenem per ID recommendations   ID ordered CT abdomen to rule out prostate abscess-if CT abdomen negative for prostate abscess, ID considering possible discharge on p.o. antibiotic regimen    Interval History:     No acute events overnight   Hemodynamically stable   ID ordered CT abdomen to rule out prostate abscess-if CT abdomen negative for prostate abscess, ID considering possible discharge on p.o. antibiotic regimen  F/u ct abdomen    Review of Systems  Objective:     Vital Signs (Most Recent):  Temp: 98.5 °F (36.9 °C) (07/08/25 1134)  Pulse: 70 (07/08/25 1134)  Resp: 15 (07/08/25 1134)  BP: 135/77 (07/08/25 1134)  SpO2: 96 % (07/08/25 1134) Vital Signs (24h Range):  Temp:  [97.4 °F (36.3 °C)-98.8 °F (37.1 °C)] 98.5 °F (36.9 °C)  Pulse:  [68-84] 70  Resp:  [13-20] 15  SpO2:  [94 %-97 %] 96 %  BP: (125-172)/(71-88) 135/77     Weight: 83.8 kg (184 lb 11.9 oz)  Body mass index is 33.79 kg/m².    Intake/Output Summary (Last 24 hours) at 7/8/2025 1200  Last data filed at 7/8/2025 0503  Gross per 24 hour   Intake 49.73 ml   Output 1100 ml   Net -1050.27 ml         Physical Exam        Constitutional:       General: He is not in acute distress.     Appearance: Ill appearance: chronic.   Cardiovascular:      Rate and Rhythm: Normal rate and regular rhythm.      Heart sounds: No murmur heard.  Pulmonary:      Effort: Pulmonary effort is normal.      Breath sounds: Normal breath sounds. No wheezing, rhonchi or rales.   Abdominal:      General: Bowel sounds are normal. There is no distension.      Palpations: Abdomen is soft.      Tenderness: There is no abdominal tenderness.   Genitourinary:     Comments: Rodrigues cath in place  Musculoskeletal:      Right lower leg: No edema.      Left lower leg: No edema.   Neurological:      Mental Status: He is alert and oriented to person, place, and time. Mental  status is at baseline.   Significant Labs: All pertinent labs within the past 24 hours have been reviewed.  CBC:   Recent Labs   Lab 07/07/25  0556 07/08/25  0629   WBC 2.58* 2.47*   HGB 10.4* 11.0*   HCT 32.7* 35.3*    162     CMP:   Recent Labs   Lab 07/07/25  0555 07/08/25  0629    139   K 4.0 3.8    104   CO2 28 26    107   BUN 11 9   CREATININE 1.0 1.0  1.0   CALCIUM 8.8 9.6   ANIONGAP 5* 9       Significant Imaging:   Imaging Results              X-Ray Chest AP Portable (Final result)  Result time 07/05/25 15:19:59      Final result by Pelon Stanford MD (07/05/25 15:19:59)                   Impression:     No acute process.    Finalized on: 7/5/2025 3:19 PM By:  Pelon Stanford MD  [facility]# [id number]      2025-07-05 15:22:04.085     [facility]               Narrative:    EXAM:  XR CHEST AP PORTABLE    CLINICAL INDICATION: Weakness.    COMPARISON STUDY:  12/14/2024.    FINDINGS: No change.  Normal size heart.  Left chest wall AICD with intact leads.  No vascular congestion.  Lungs are clear.  Multilevel posterior cervical spinal fusion.  Small calcium deposit left rotator cuff footprint.                                           Assessment & Plan  Complicated UTI (urinary tract infection)  - in setting of known BPH, prostate cancer   - s/p 20Fr Coude placement in the ED   - Continue IV Merrem (has hx of ESBL E. Coli)   - urine culture pending   - Anticipate discharge with batres catheter in place- per urology Dr. Valdez- he will set pt up to see Dr. Lassiter in clinic on discharge     Sepsis  Patient has sepsis without organ dysfunction secondary to Urinary Tract Infection. A review of systems was completed. Patient's sepsis is undergoing treatment     Current Antibiotics    ertapenem (INVANZ) 1 g in 0.9% NaCl 100 mL IVPB (MB+), Every 24 hours (non-standard times), Intravenous    Lactate  Recent Labs   Lab 07/05/25  1446 07/05/25  1751   LACTATE 2.3* 1.9     Culture Data  Blood Cultures   Blood  Culture   Date Value Ref Range Status   07/05/2025 No Growth After 48 Hours  Preliminary   07/05/2025 No Growth After 48 Hours  Preliminary      Urine Culture   Urine Culture   Date Value Ref Range Status   07/05/2025 >100,000 cfu/ml Escherichia coli ESBL (A)  Preliminary     Comment:     Corrected result: Previously reported as Gram-negative Rods on 7/8/2025 at 0438 CDT.     Urine Culture, Routine   Date Value Ref Range Status   02/22/2025 ESCHERICHIA COLI ESBL  >100,000 cfu/ml   (A)  Final      mSOFA  MSOFA Total  Min: 0   Min taken time: 07/08/25 1100  Max: 0   Max taken time: 07/08/25 1100    Plan  - Antibiotics as listed above  - Fluid resuscitation as follows:received 1L NS bolus in ED (limited due to hx of CHF). Lactate normalized with IV hydration.   - stop IV fluids   - Follow up culture data  - Vasopressors were not needed  - Cdif pending given reported diarrhea on admission        Hypokalemia  Patient's most recent potassium results are listed below.   Recent Labs     07/06/25  0552 07/07/25  0555 07/08/25  0629   K 3.7 4.0 3.8     Plan  - Replete potassium per protocol  - Monitor potassium Daily  - Patient's hypokalemia is resolved  Prostate cancer  - followed by Urology Dr. Lassiter and Rad Onc as outapteint   - Currently on Darolutamide  - Hold oral chemo for now in setting of UTI     Chronic systolic (congestive) heart failure  Patient has Systolic (HFrEF) heart failure that is Chronic. On presentation their CHF was well compensated. Most recent BNP and echo results are listed below.  Recent Labs     07/05/25  1509   BNP 85     Latest ECHO  No results found for this or any previous visit.    Current Heart Failure Medications       Plan  - Monitor strict I&Os and daily weights.    - Place on telemetry  - Low sodium diet  - Place on fluid restriction of 1.5 L.   - Cardiology has not been consulted  - The patient's volume status is at their baseline  - Last known EF 20-25% in 2018, s/p AICD, followed by  Cardiology Dr. Dyer as outpatient   - hold home entresto and lasix in setting of sepsis and hypotension, may resume in AM pending BP trends       Type 2 diabetes mellitus  Patient's FSGs are controlled on current medication regimen.  Last A1c reviewed-   Lab Results   Component Value Date    HGBA1C 5.8 (H) 07/06/2025     Most recent fingerstick glucose reviewed-   Recent Labs   Lab 07/07/25  1718 07/07/25  2115 07/08/25  0555 07/08/25  0920   POCTGLUCOSE 147* 166* 109 167*     Current correctional scale  Medium  Maintain anti-hyperglycemic dose as follows-   Antihyperglycemics (From admission, onward)      Start     Stop Route Frequency Ordered    07/05/25 1630  insulin aspart U-100 pen 0-10 Units         -- SubQ Before meals & nightly PRN 07/05/25 1531        Continue mod dose SSI   Repeat A1c pending   Monitor BG; hypoglycemia protocol   Hold Oral hypoglycemics while patient is in the hospital.  PERNELL (obstructive sleep apnea)  Not on CPAP   Wean Supplemental O2- maintain sats > 90%     VTE Risk Mitigation (From admission, onward)           Ordered     enoxaparin injection 40 mg  Daily         07/05/25 1531     IP VTE HIGH RISK PATIENT  Once         07/05/25 1531     Place sequential compression device  Until discontinued         07/05/25 1531                    Discharge Planning   BONITA: 7/9/2025     Code Status: Full Code   Medical Readiness for Discharge Date:   Discharge Plan A: Home with family                        Sergioedita Juwan Ayala MD  Department of Hospital Medicine   [facility] - Med Surg

## 2025-07-08 NOTE — PROGRESS NOTES
CM received consult for the arrangement of home IV antibiotics. [facility]. Pending approval.    Shift Summary:    Vitals with min/max:       Vital Last Value 24 Hour Range   Temperature 95.9 °F (35.5 °C) (12/08/18 0230) Temp  Min: 93.9 °F (34.4 °C)  Max: 97.8 °F (36.6 °C)   Pulse 77 (12/08/18 0230) Pulse  Min: 43  Max: 77   Respiratory 22 (12/08/18 0230) Resp  Min: 10  Max: 39   Non-Invasive  Blood Pressure 119/51 (12/08/18 0230) BP  Min: 62/48  Max: 119/51   Pulse Oximetry 98 % (12/08/18 0343) SpO2  Min: 93 %  Max: 99 %   Arterial   Blood Pressure   No Data Recorded        Neuro status: A&O x  4    Cardiac: AV Paced rate-64 bpm. Hypotension now stable. Weaned off of levo now on 14 mcg/kg/min dobutamine. Severe R sided HF very fluid overloaded.    Respiratory: Oxygen Flolan nebulizer @ 50 ng/kg/min    : Salazar:  urine output increasing. Creatinine 3.4. Presented with KARLOS and elevated K+. Dr Wade is following, CVVH on hold now after increasing urine output.     GI: Bowel Tones:   Normoactive    Incisions & Skin: BLE have healed wounds and patient is being followed by outpatient wound care. Stage 2 PI midline coccyx with barrier cream on it    Activity:  turns    I&O last 8 hours: +646 mL    Neurovascular: +2 pulses with edema everywhere    Pain: WDL    Current infusions: dopamine, lasix @ 20 mg/h    Pertinent labs: potassium 5.8. Improving  Creatinine also improving now 3.4    Plan of care/upcoming events:  Potential CVVH if urine output not improving and potassium not decreasing further.

## 2025-07-08 NOTE — ASSESSMENT & PLAN NOTE
Patient's most recent potassium results are listed below.   Recent Labs     07/06/25  0552 07/07/25  0555 07/08/25  0629   K 3.7 4.0 3.8     Plan  - Replete potassium per protocol  - Monitor potassium Daily  - Patient's hypokalemia is resolved

## 2025-07-08 NOTE — SUBJECTIVE & OBJECTIVE
Past Medical History:   Diagnosis Date    Diabetes mellitus, type 2     Hyperlipidemia     Hypertension        Past Surgical History:   Procedure Laterality Date    SPINAL FUSION N/A 2019    TRANSURETHRAL RESECTION OF PROSTATE N/A 1/6/2025    Procedure: TURP (TRANSURETHRAL RESECTION OF PROSTATE);  Surgeon: Ron Lassiter MD;  Location: [facility];  Service: Urology;  Laterality: N/A;       Review of patient's allergies indicates:   Allergen Reactions    Adhesive Rash     Silk Tape    Suture, silk Rash       Medications:  Medications Prior to Admission   Medication Sig    amLODIPine (NORVASC) 5 MG tablet Take 5 mg by mouth once daily.    busPIRone (BUSPAR) 5 MG Tab Take 5 mg by mouth 2 (two) times daily.    clopidogreL (PLAVIX) 75 mg tablet Take 75 mg by mouth once daily.    colchicine (COLCRYS) 0.6 mg tablet Take 0.6 mg by mouth daily as needed.    dapagliflozin propanediol (FARXIGA) 10 mg tablet Take 10 mg by mouth.    darolutamide 300 mg Tab Take 2 tablets (600 mg) by mouth 2 (two) times a day.    ENTRESTO 24-26 mg per tablet Take 1 tablet by mouth 2 (two) times daily.    furosemide (LASIX) 40 MG tablet Take 40 mg by mouth once daily.    gabapentin (NEURONTIN) 300 MG capsule     JANUMET  mg per tablet Take 1 tablet by mouth 2 (two) times daily.    ketoconazole (NIZORAL) 2 % cream Apply topically 2 (two) times daily.    sildenafiL (VIAGRA) 100 MG tablet Take 1 tablet (100 mg total) by mouth daily as needed for Erectile Dysfunction.    tamsulosin (FLOMAX) 0.4 mg Cap Take 1 capsule (0.4 mg total) by mouth 2 (two) times a day.    zolpidem (AMBIEN) 5 MG Tab Take 5 mg by mouth.     Antibiotics (From admission, onward)      Start     Stop Route Frequency Ordered    07/05/25 2100  mupirocin 2 % ointment         07/10/25 2059 Nasl 2 times daily 07/05/25 1820    07/05/25 1545  meropenem injection 1 g         -- IV Every 8 hours (non-standard times) 07/05/25 1440          Antifungals (From admission, onward)       None          Antivirals (From admission, onward)      None             There is no immunization history for the selected administration types on file for this patient.    Family History       Problem Relation (Age of Onset)    Colon cancer Mother    Throat cancer Sister          Social History     Socioeconomic History    Marital status:    Tobacco Use    Smoking status: Former     Current packs/day: 0.00     Types: Cigarettes     Quit date:      Years since quittin.5     Passive exposure: Never    Smokeless tobacco: Never   Substance and Sexual Activity    Alcohol use: Yes     Alcohol/week: 1.0 standard drink of alcohol     Types: 1 Cans of beer per week     Comment: About 1 drink a week socially    Drug use: Never    Sexual activity: Not Currently   Social History Narrative    Lives in [place] with wife, Michael. Works as a  at [employer]     Social Drivers of Health     Financial Resource Strain: Low Risk  (2025)    Overall Financial Resource Strain (CARDIA)     Difficulty of Paying Living Expenses: Not very hard   Food Insecurity: No Food Insecurity (2025)    Hunger Vital Sign     Worried About Running Out of Food in the Last Year: Never true     Ran Out of Food in the Last Year: Never true   Transportation Needs: No Transportation Needs (2025)    PRAPARE - Transportation     Lack of Transportation (Medical): No     Lack of Transportation (Non-Medical): No   Physical Activity: Insufficiently Active (2025)    Exercise Vital Sign     Days of Exercise per Week: 2 days     Minutes of Exercise per Session: 30 min   Stress: Stress Concern Present (2025)    [employer] [place] - Occupational Stress Questionnaire     Feeling of Stress : To some extent   Housing Stability: Low Risk  (2025)    Housing Stability Vital Sign     Unable to Pay for Housing in the Last Year: No     Homeless in the Last Year: No     Review of Systems   Constitutional:   "Negative for activity change, appetite change, chills, diaphoresis, fatigue and fever.   HENT:  Negative for congestion.    Neurological:  Negative for dizziness, facial asymmetry and headaches.     Objective:     Vital Signs (Most Recent):  Temp: 97.9 °F (36.6 °C) (07/08/25 0011)  Pulse: 68 (07/08/25 0300)  Resp: 18 (07/08/25 0011)  BP: 127/76 (07/08/25 0011)  SpO2: 97 % (07/08/25 0011) Vital Signs (24h Range):  Temp:  [97.9 °F (36.6 °C)-99.1 °F (37.3 °C)] 97.9 °F (36.6 °C)  Pulse:  [68-84] 68  Resp:  [15-20] 18  SpO2:  [94 %-97 %] 97 %  BP: (119-172)/(68-88) 127/76     Weight: 83.8 kg (184 lb 11.9 oz)  Body mass index is 33.79 kg/m².    Estimated Creatinine Clearance: 68.1 mL/min (based on SCr of 1 mg/dL).     Physical Exam  Vitals and nursing note reviewed.   HENT:      Head: Normocephalic.   Musculoskeletal:         General: Normal range of motion.   Skin:     General: Skin is warm.   Neurological:      General: No focal deficit present.      Mental Status: He is alert.   Psychiatric:         Mood and Affect: Mood normal.          Significant Labs: Blood Culture: No results for input(s): "LABBLOO" in the last 4320 hours.  BMP:   Recent Labs   Lab 07/07/25  0555         K 4.0      CO2 28   BUN 11   CREATININE 1.0   CALCIUM 8.8   MG 1.9     CBC:   Recent Labs   Lab 07/06/25  0552 07/07/25  0556   WBC 2.68* 2.58*   HGB 10.1* 10.4*   HCT 32.1* 32.7*    152     Microbiology Results (last 7 days)       Procedure Component Value Units Date/Time    Blood culture [[id number]]  (Normal) Collected: 07/05/25 1521    Order Status: Completed Specimen: Blood from Peripheral, Forearm, Left Updated: 07/08/25 0204     Blood Culture No Growth After 48 Hours    Blood culture [[id number]]  (Normal) Collected: 07/05/25 1524    Order Status: Completed Specimen: Blood from Peripheral, Hand, Right Updated: 07/08/25 0204     Blood Culture No Growth After 48 Hours    Urine culture [[id number]]  (Abnormal) " Collected: 07/05/25 1343    Order Status: Completed Specimen: Urine Updated: 07/07/25 0335     Urine Culture >100,000 cfu/ml Gram-negative Rods     Comment: Identification and susceptibility pending       Clostridium difficile EIA [[id number]] Collected: 07/06/25 2054    Order Status: Canceled Specimen: Stool Updated: 07/06/25 2103            All pertinent labs within the past 24 hours have been reviewed.    Significant Imaging: I have reviewed all pertinent imaging results/findings within the past 24 hours.

## 2025-07-08 NOTE — PLAN OF CARE
Discussed poc with pt, pt verbalized understanding    Purposeful rounding every 2hours    VS monitored for need of PRN interventions   Cardiac monitoring in use, pt is NSR, tele monitor # [id number]  Blood glucose monitoring   Fall precautions in place, remains injury free  Pt denies c/o N/V  Pain under control with PRN meds    Accurate I&Os  Abx given as prescribed  Bed locked at lowest position  Call light within reach    Chart check complete  Will cont with POC

## 2025-07-08 NOTE — ASSESSMENT & PLAN NOTE
Patient's FSGs are controlled on current medication regimen.  Last A1c reviewed-   Lab Results   Component Value Date    HGBA1C 5.8 (H) 07/06/2025     Most recent fingerstick glucose reviewed-   Recent Labs   Lab 07/07/25  1718 07/07/25  2115 07/08/25  0555 07/08/25  0920   POCTGLUCOSE 147* 166* 109 167*     Current correctional scale  Medium  Maintain anti-hyperglycemic dose as follows-   Antihyperglycemics (From admission, onward)      Start     Stop Route Frequency Ordered    07/05/25 1630  insulin aspart U-100 pen 0-10 Units         -- SubQ Before meals & nightly PRN 07/05/25 1531        Continue mod dose SSI   Repeat A1c pending   Monitor BG; hypoglycemia protocol   Hold Oral hypoglycemics while patient is in the hospital.

## 2025-07-08 NOTE — CARE UPDATE
Outpatient Antibiotic Therapy Plan:     Please send referral to [facility].     1) Infection: -recurrent UTI      2) Discharge Antibiotics:     Intravenous antibiotics:  IV Ertapenem one gram daily      3) Therapy Duration:  7 days      Estimated end date of IV antibiotics: 07/15     4) Outpatient Weekly Labs:     Order the following labs to be drawn on Mondays:   CBC  CMP   CPK (when on Daptomycin)  ESR  CRP  -Can pull picc line at end of treatment unless instructed otherwise .   Please send all labs to [facility] .

## 2025-07-08 NOTE — ASSESSMENT & PLAN NOTE
Patient has sepsis without organ dysfunction secondary to Urinary Tract Infection. A review of systems was completed. Patient's sepsis is undergoing treatment     Current Antibiotics    ertapenem (INVANZ) 1 g in 0.9% NaCl 100 mL IVPB (MB+), Every 24 hours (non-standard times), Intravenous    Lactate  Recent Labs   Lab 07/05/25  1446 07/05/25  1751   LACTATE 2.3* 1.9     Culture Data  Blood Cultures   Blood Culture   Date Value Ref Range Status   07/05/2025 No Growth After 48 Hours  Preliminary   07/05/2025 No Growth After 48 Hours  Preliminary      Urine Culture   Urine Culture   Date Value Ref Range Status   07/05/2025 >100,000 cfu/ml Escherichia coli ESBL (A)  Preliminary     Comment:     Corrected result: Previously reported as Gram-negative Rods on 7/8/2025 at 0438 CDT.     Urine Culture, Routine   Date Value Ref Range Status   02/22/2025 ESCHERICHIA COLI ESBL  >100,000 cfu/ml   (A)  Final      mSOFA  MSOFA Total  Min: 0   Min taken time: 07/08/25 1100  Max: 0   Max taken time: 07/08/25 1100    Plan  - Antibiotics as listed above  - Fluid resuscitation as follows:received 1L NS bolus in ED (limited due to hx of CHF). Lactate normalized with IV hydration.   - stop IV fluids   - Follow up culture data  - Vasopressors were not needed  - Cdif pending given reported diarrhea on admission

## 2025-07-08 NOTE — HPI
64-year-old  male with past medical history notable for chronic systolic heart failure (EF 20-25%) status post AICD, type 2 diabetes mellitus, essential hypertension, carotid disease, prostate cancer (on treatment) who presents to the ED on 07/05/2025.  He was admitted for difficulty with passing urine.  There is associated history of watery diarrhea.  Since admission he was seen by Urology and a 20 Sw[place] coude was used.  Labs and imaging  test showed UTI.  Previous urine culture 0612 ESBL E coli  Culture 0705 Gram-negative rods  .  CBC shows normal WBC

## 2025-07-08 NOTE — SUBJECTIVE & OBJECTIVE
Interval History:     No acute events overnight   Hemodynamically stable   ID ordered CT abdomen to rule out prostate abscess-if CT abdomen negative for prostate abscess, ID considering possible discharge on p.o. antibiotic regimen  F/u ct abdomen    Review of Systems  Objective:     Vital Signs (Most Recent):  Temp: 98.5 °F (36.9 °C) (07/08/25 1134)  Pulse: 70 (07/08/25 1134)  Resp: 15 (07/08/25 1134)  BP: 135/77 (07/08/25 1134)  SpO2: 96 % (07/08/25 1134) Vital Signs (24h Range):  Temp:  [97.4 °F (36.3 °C)-98.8 °F (37.1 °C)] 98.5 °F (36.9 °C)  Pulse:  [68-84] 70  Resp:  [13-20] 15  SpO2:  [94 %-97 %] 96 %  BP: (125-172)/(71-88) 135/77     Weight: 83.8 kg (184 lb 11.9 oz)  Body mass index is 33.79 kg/m².    Intake/Output Summary (Last 24 hours) at 7/8/2025 1200  Last data filed at 7/8/2025 0503  Gross per 24 hour   Intake 49.73 ml   Output 1100 ml   Net -1050.27 ml         Physical Exam        Constitutional:       General: He is not in acute distress.     Appearance: Ill appearance: chronic.   Cardiovascular:      Rate and Rhythm: Normal rate and regular rhythm.      Heart sounds: No murmur heard.  Pulmonary:      Effort: Pulmonary effort is normal.      Breath sounds: Normal breath sounds. No wheezing, rhonchi or rales.   Abdominal:      General: Bowel sounds are normal. There is no distension.      Palpations: Abdomen is soft.      Tenderness: There is no abdominal tenderness.   Genitourinary:     Comments: Rodrigues cath in place  Musculoskeletal:      Right lower leg: No edema.      Left lower leg: No edema.   Neurological:      Mental Status: He is alert and oriented to person, place, and time. Mental status is at baseline.   Significant Labs: All pertinent labs within the past 24 hours have been reviewed.  CBC:   Recent Labs   Lab 07/07/25  0556 07/08/25  0629   WBC 2.58* 2.47*   HGB 10.4* 11.0*   HCT 32.7* 35.3*    162     CMP:   Recent Labs   Lab 07/07/25  0555 07/08/25  0629    139   K 4.0 3.8     104   CO2 28 26    107   BUN 11 9   CREATININE 1.0 1.0  1.0   CALCIUM 8.8 9.6   ANIONGAP 5* 9       Significant Imaging:   Imaging Results              X-Ray Chest AP Portable (Final result)  Result time 07/05/25 15:19:59      Final result by Pelon Stanford MD (07/05/25 15:19:59)                   Impression:     No acute process.    Finalized on: 7/5/2025 3:19 PM By:  Pelon Stanford MD  [facility]# [id number]      2025-07-05 15:22:04.085     [facility]               Narrative:    EXAM:  XR CHEST AP PORTABLE    CLINICAL INDICATION: Weakness.    COMPARISON STUDY:  12/14/2024.    FINDINGS: No change.  Normal size heart.  Left chest wall AICD with intact leads.  No vascular congestion.  Lungs are clear.  Multilevel posterior cervical spinal fusion.  Small calcium deposit left rotator cuff footprint.

## 2025-07-08 NOTE — ASSESSMENT & PLAN NOTE
We will continue meropenem.  Follow repeat urine culture to guide treatment..  Previous urine culture-  07/05- ESBL E coli

## 2025-07-08 NOTE — PLAN OF CARE
Discussed poc with pt, pt verbalized understanding  Purposeful rounding every 2hours  VS wnl  Cardiac monitoring in use, pt is NSR, tele monitor #[id number]  Blood glucose monitoring   Fall precautions in place, remains injury free  Pain under control with PRN meds  Accurate I&Os  Abx given as prescribed  Bed locked at lowest position  Call light within reach  Will cont with POC

## 2025-07-09 VITALS
RESPIRATION RATE: 18 BRPM | TEMPERATURE: 99 F | DIASTOLIC BLOOD PRESSURE: 82 MMHG | OXYGEN SATURATION: 98 % | BODY MASS INDEX: 34 KG/M2 | SYSTOLIC BLOOD PRESSURE: 141 MMHG | WEIGHT: 184.75 LBS | HEART RATE: 71 BPM | HEIGHT: 62 IN

## 2025-07-09 LAB
POCT GLUCOSE: 117 MG/DL (ref 70–110)
POCT GLUCOSE: 183 MG/DL (ref 70–110)

## 2025-07-09 PROCEDURE — 25000003 PHARM REV CODE 250: Performed by: INTERNAL MEDICINE

## 2025-07-09 PROCEDURE — 63600175 PHARM REV CODE 636 W HCPCS: Performed by: STUDENT IN AN ORGANIZED HEALTH CARE EDUCATION/TRAINING PROGRAM

## 2025-07-09 PROCEDURE — 25000003 PHARM REV CODE 250: Performed by: STUDENT IN AN ORGANIZED HEALTH CARE EDUCATION/TRAINING PROGRAM

## 2025-07-09 RX ORDER — PHENAZOPYRIDINE HYDROCHLORIDE 200 MG/1
200 TABLET, FILM COATED ORAL 3 TIMES DAILY PRN
Qty: 15 TABLET | Refills: 0 | Status: SHIPPED | OUTPATIENT
Start: 2025-07-09

## 2025-07-09 RX ADMIN — PHENAZOPYRIDINE 100 MG: 100 TABLET ORAL at 08:07

## 2025-07-09 RX ADMIN — PHENAZOPYRIDINE 100 MG: 100 TABLET ORAL at 01:07

## 2025-07-09 RX ADMIN — INSULIN ASPART 2 UNITS: 100 INJECTION, SOLUTION INTRAVENOUS; SUBCUTANEOUS at 10:07

## 2025-07-09 RX ADMIN — ERTAPENEM 1 G: 1 INJECTION INTRAMUSCULAR; INTRAVENOUS at 01:07

## 2025-07-09 RX ADMIN — CLOPIDOGREL 75 MG: 75 TABLET ORAL at 08:07

## 2025-07-09 NOTE — DISCHARGE INSTRUCTIONS
Recommend compliance with medications, outpatient follow up with PCP, Urology within 1 week upon discharge

## 2025-07-09 NOTE — ASSESSMENT & PLAN NOTE
Patient's FSGs are controlled on current medication regimen.  Last A1c reviewed-   Lab Results   Component Value Date    HGBA1C 5.8 (H) 07/06/2025     Most recent fingerstick glucose reviewed-   Recent Labs   Lab 07/08/25  1759 07/08/25  2224 07/09/25  0711   POCTGLUCOSE 150* 127* 117*     Current correctional scale  Medium  Maintain anti-hyperglycemic dose as follows-   Antihyperglycemics (From admission, onward)      Start     Stop Route Frequency Ordered    07/05/25 1630  insulin aspart U-100 pen 0-10 Units         -- SubQ Before meals & nightly PRN 07/05/25 1531        Continue mod dose SSI   Repeat A1c pending   Monitor BG; hypoglycemia protocol   Hold Oral hypoglycemics while patient is in the hospital.

## 2025-07-09 NOTE — PROGRESS NOTES
CM met with patient to discuss recommendation of home health at the time of discharge. Patient is agreeable to discharge plan.    Patient was provided with a list of home health agencies that are in-network with patient's payor plan with added CMS star ratings. Providers that are owned, operated, or affiliated with [facility] are included on the list.    Notified that referral was sent to the below listed agencies from in-network list based on proximity to home/family support:   [facility]    Patient/family instructed to identify preference.    Preferred Agency: (if more than 1, listed in order of descending preference)  [facility]    OR  Patient has declined to select a preferred provider and elects placement with the first accepting in network provider that is available to provide services as ordered by the physician     If an additional preferred agency is not listed above is identified, additional referral to be sent. If above agencies are unable to accept, will send additional referrals to in-network providers.

## 2025-07-09 NOTE — SUBJECTIVE & OBJECTIVE
"Interval History:   He is  tolerating medications.  Denies fever or chills.  CT of the pelvis did not show any acute prostate abscess    Review of Systems   Constitutional:  Negative for activity change, appetite change, chills, diaphoresis and fatigue.   Neurological:  Negative for dizziness and facial asymmetry.     Objective:     Vital Signs (Most Recent):  Temp: 97.8 °F (36.6 °C) (07/09/25 0500)  Pulse: 70 (07/09/25 0500)  Resp: 18 (07/09/25 0500)  BP: 138/79 (07/09/25 0500)  SpO2: 97 % (07/09/25 0500) Vital Signs (24h Range):  Temp:  [97.4 °F (36.3 °C)-98.5 °F (36.9 °C)] 97.8 °F (36.6 °C)  Pulse:  [68-73] 70  Resp:  [13-19] 18  SpO2:  [96 %-98 %] 97 %  BP: (130-154)/(76-87) 138/79     Weight: 83.8 kg (184 lb 11.9 oz)  Body mass index is 33.79 kg/m².    Estimated Creatinine Clearance: 68.1 mL/min (based on SCr of 1 mg/dL).     Physical Exam  Vitals and nursing note reviewed.   HENT:      Head: Normocephalic.   Musculoskeletal:         General: Normal range of motion.   Skin:     General: Skin is warm.   Neurological:      General: No focal deficit present.      Mental Status: He is alert.   Psychiatric:         Mood and Affect: Mood normal.          Significant Labs: Blood Culture: No results for input(s): "LABBLOO" in the last 4320 hours.  BMP:   Recent Labs   Lab 07/08/25 0629         K 3.8      CO2 26   BUN 9   CREATININE 1.0  1.0   CALCIUM 9.6   MG 1.9     CBC:   Recent Labs   Lab 07/08/25 0629   WBC 2.47*   HGB 11.0*   HCT 35.3*        CMP:   Recent Labs   Lab 07/08/25 0629      K 3.8      CO2 26      BUN 9   CREATININE 1.0  1.0   CALCIUM 9.6   ANIONGAP 9     Microbiology Results (last 7 days)       Procedure Component Value Units Date/Time    Blood culture [[id number]]  (Normal) Collected: 07/05/25 1521    Order Status: Completed Specimen: Blood from Peripheral, Forearm, Left Updated: 07/09/25 0204     Blood Culture No Growth After 72 Hours    Blood culture " [[id number]]  (Normal) Collected: 07/05/25 1524    Order Status: Completed Specimen: Blood from Peripheral, Hand, Right Updated: 07/09/25 0204     Blood Culture No Growth After 72 Hours    Urine culture [[id number]]  (Abnormal)  (Susceptibility) Collected: 07/05/25 1343    Order Status: Completed Specimen: Urine Updated: 07/08/25 1314     Urine Culture >100,000 cfu/ml Escherichia coli ESBL     Comment: Corrected result: Previously reported as Gram-negative Rods on 7/8/2025 at 0438 CDT.       Clostridium difficile EIA [[id number]] Collected: 07/06/25 2054    Order Status: Canceled Specimen: Stool Updated: 07/06/25 2103            All pertinent labs within the past 24 hours have been reviewed.    Significant Imaging: I have reviewed all pertinent imaging results/findings within the past 24 hours.

## 2025-07-09 NOTE — ASSESSMENT & PLAN NOTE
We will continue meropenem.  Follow repeat urine culture to guide treatment..  Previous urine culture-  07/05- ESBL E coli    07/08-  He had recurrent ESBL E coli UTI  Urine culture 02/22/2025 E coli   urine culture 0 6/1225 E coli   urine culture 0706 E coli  This trends are all ESBL.  Due to recurrent nature we will plan to complete 7 days of ertapenem  Please send referral to [facility].     1) Infection: -recurrent UTI      2) Discharge Antibiotics:     Intravenous antibiotics:  IV Ertapenem one gram daily      3) Therapy Duration:  7 days      Estimated end date of IV antibiotics: 07/15     4) Outpatient Weekly Labs:     Order the following labs to be drawn on Mondays:   CBC  CMP   CPK (when on Daptomycin)  ESR  CRP  -Can pull picc line at end of treatment unless instructed otherwise .   Please send all labs to [facility] .

## 2025-07-09 NOTE — ASSESSMENT & PLAN NOTE
Patient has sepsis without organ dysfunction secondary to Urinary Tract Infection. A review of systems was completed. Patient's sepsis is undergoing treatment     Current Antibiotics    , Daily, Intravenous  ertapenem (INVANZ) 1 g in 0.9% NaCl 100 mL IVPB (MB+), Every 24 hours (non-standard times), Intravenous  , Daily, Intravenous    Lactate  Recent Labs   Lab 07/05/25  1446 07/05/25  1751   LACTATE 2.3* 1.9     Culture Data  Blood Cultures   Blood Culture   Date Value Ref Range Status   07/05/2025 No Growth After 72 Hours  Preliminary   07/05/2025 No Growth After 72 Hours  Preliminary      Urine Culture   Urine Culture   Date Value Ref Range Status   07/05/2025 >100,000 cfu/ml Escherichia coli ESBL (A)  Final     Comment:     Corrected result: Previously reported as Gram-negative Rods on 7/8/2025 at 0438 CDT.     Urine Culture, Routine   Date Value Ref Range Status   02/22/2025 ESCHERICHIA COLI ESBL  >100,000 cfu/ml   (A)  Final      mSOFA  MSOFA Total  Min: 0   Min taken time: 07/09/25 1401  Max: 0   Max taken time: 07/09/25 1401    Plan  - Antibiotics as listed above  - Fluid resuscitation as follows:received 1L NS bolus in ED (limited due to hx of CHF). Lactate normalized with IV hydration.   - stop IV fluids   - Follow up culture data  - Vasopressors were not needed  - Cdif pending given reported diarrhea on admission

## 2025-07-09 NOTE — PLAN OF CARE
[facility]  Discharge Final Note    Primary Care Provider: Ochoa Lane MD    Expected Discharge Date: 7/9/2025    Final Discharge Note (most recent)       Final Note - 07/09/25 1213          Final Note    Assessment Type Final Discharge Note     Anticipated Discharge Disposition Home-Health Care [facility]     Hospital Resources/Appts/Education Provided Post-Acute resouces added to AVS        Post-Acute Status    Post-Acute Authorization IV Infusion;Home Health     Home Health Status Set-up Complete/Auth obtained     IV Infusion Status Set-up Complete/Auth obtained     Patient choice form signed by patient/caregiver List from CMS Compare     Discharge Delays None known at this time                     Important Message from Medicare              Follow-up providers       Ochoa Lane MD   Specialty: Family Medicine   Relationship: PCP - General    [address] [facility] [address]   Phone: [phone]       Next Steps: Follow up in 1 week(s)    Ron Lassiter MD   Specialty: Urology    [address]  [place] [address]   Phone: [phone]       Next Steps: Follow up in 1 week(s)    [facility] [place]   Specialty: Home Infusion and Injection Services, Infusion Clinic, Infusion Therapy    [address]  [address]  [facility] [address]       Next Steps: Follow up              After-discharge care                Home Medical Care       *[facility] [place]   Service: Home Health Services    [address]  [facility] [address]   Phone: [phone]                             DC Dispo: home with home health    PCP: instructions to follow up with non [facility] PCP on AVS.    DME: none    [facility] and [facility] arranged for patient.    CM reviewed chart; no discharge needs noted.    Patient ok to discharge once bedside teaching is complete. Primary nurse made aware.

## 2025-07-09 NOTE — ASSESSMENT & PLAN NOTE
"Patient has Systolic (HFrEF) heart failure that is Chronic. On presentation their CHF was well compensated. Most recent BNP and echo results are listed below.  No results for input(s): "BNP" in the last 72 hours.    Latest ECHO  No results found for this or any previous visit.    Current Heart Failure Medications       Plan  - Monitor strict I&Os and daily weights.    - Place on telemetry  - Low sodium diet  - Place on fluid restriction of 1.5 L.   - Cardiology has not been consulted  - The patient's volume status is at their baseline  - Last known EF 20-25% in 2018, s/p AICD, followed by Cardiology Dr. Dyer as outpatient   - hold home entresto and lasix in setting of sepsis and hypotension, may resume in AM pending BP trends       "

## 2025-07-09 NOTE — ASSESSMENT & PLAN NOTE
Patient's most recent potassium results are listed below.   Recent Labs     07/07/25  0555 07/08/25  0629   K 4.0 3.8     Plan  - Replete potassium per protocol  - Monitor potassium Daily  - Patient's hypokalemia is resolved

## 2025-07-09 NOTE — PLAN OF CARE
Discussed poc with pt, pt verbalized understanding    Purposeful rounding every 2hours    VS monitored for need of PRN interventions   Cardiac monitoring in use, pt is NSR, tele monitor # [id number]  Blood glucose monitoring   Fall precautions in place, remains injury free  Pt denies c/o N/V  Pain under control with PRN meds    Accurate I&Os  Bed locked at lowest position  Call light within reach    Chart check complete  Will cont with POC

## 2025-07-09 NOTE — DISCHARGE SUMMARY
[facility] Medicine  Discharge Summary      Patient Name: Hector Smith  MRN: [id number]  CHRISSY: [id number]  Patient Class: IP- Inpatient  Admission Date: 7/5/2025  Hospital Length of Stay: 4 days  Discharge Date and Time: 07/09/2025 2:40 PM  Attending Physician: Francisco Ayala,*   Discharging Provider: Francisco Ayala MD  Primary Care Provider: Ochoa Lane MD    Primary Care Team: Networked reference to record PCT     HPI:   Patient is a 64-year-old  male with past medical history notable for chronic systolic heart failure (EF 20-25%) status post AICD, type 2 diabetes mellitus, essential hypertension, carotid disease, prostate cancer (on treatment) who presents to the ED on 07/05/2025 for evaluation of difficulty urinating.  Patient reports that he has been having increased pain with urination for the last 3-4 days and over the last 2 days has not been able to urinate.  His wife attempted to straight cath him at home with no success.  He reports lower abdominal pressure/discomfort as well as a few drops of blood at the end of history when he was able to urinate previously.  Also reports 2 day history of watery diarrhea, up to 3 stools per day.  Denies chest pain, shortness of breath, nausea, vomiting, decreased p.o. intake, fevers, chills.  In the ED, initial vital signs:  Patient afebrile, heart rate 101, respiratory rate 16, blood pressure 181/93, sats 99% on room air.  Straight cath x2 was attempted in the ED but was unsuccessful, Urology was consulted and recommended a 20 Faroese coude which was placed.  Shortly after catheter placement patient became diaphoretic and hypotensive- received 1L NS bolus.  Initial workup:  CBC without leukocytosis, hemoglobin 11.5, platelets 118, potassium 2.9, bicarb 18, anion gap 17, creatinine 1.2, BNP within normal limits.  Lactate 2.3.  UA consistent with UTI.  He received p.o. potassium 20 mEq, IV meropenem based on  previous cultures. Hospital medicine consulted for admission    * No surgery found *      Hospital Course:   Admitted under  for uti, urinary retention  Urology consulted- recommended Rodrigues catheter, recommended to discharge patient on Rodrigues catheter with outpatient follow up with Urology in 1 week for voiding trail.  Urine culture as of 07/05/2025 positive for ESBL E coli, antibiotics changed to ertapenem per ID recommendations   ID ordered CT abdomen which showed Diffuse bladder wall thickening concerning for cystitis. Air within the bladder thought to reflect instrumentation. Rodrigues catheter and balloon projects centrally within the bladder.Heterogeneous prostate gland with multiple calcifications and scattered clips without discrete abscess identified.  ID recommended IV Ertapenem one gram daily -- Therapy Duration:  7 days ; Estimated end date of IV antibiotics: 07/15;  7/9  Examination done at bedside, appeared alert and oriented x3   Denied acute events overnight   Denied fever, chills, chest pain, short of breath, nausea, vomiting, bowel or bladder issues  Appeared hemodynamically stable, afebrile, no leukocytosis   Cleared for discharge from Urology standpoint, recommended outpatient follow up in 1 week for voiding trail, recommended pain meds, Pyridium/VESIcare p.r.n. for bladder spasms.  Status post PICC line on 07/08;  Cleared for discharge from ID standpoint recommended IV antibiotics/ertapenem with end of treatment 7/15  Ordered home health;  worked on arrangements for home health, home IV infusion   Planning to discharge patient today, emphasized on compliance with medications, outpatient follow up visits, patient/family at bedside expressed understanding, agreed with the plan;  Code status discussed, full code for now       Goals of Care Treatment Preferences:  Code Status: Full Code         Consults:   Consults (From admission, onward)          Status Ordering Provider     Inpatient  consult to Midline team  Once        Provider:  (Not yet assigned)    Acknowledged AKOSUA DHILLON     Inpatient consult to Social Work  Once        Provider:  (Not yet assigned)    Completed AKOSUA DHILLON     Inpatient consult to Infectious Diseases  Once        Provider:  Haroldo Nix MD, JOANIE    Acknowledged AKOSUA DHILLON            Assessment & Plan  Complicated UTI (urinary tract infection)  - in setting of known BPH, prostate cancer   - s/p 20Fr Coude placement in the ED   - Continue IV Merrem (has hx of ESBL E. Coli)   - urine culture pending   - Anticipate discharge with batres catheter in place- per urology Dr. Valdez- he will set pt up to see Dr. Lassiter in clinic on discharge     Sepsis  Patient has sepsis without organ dysfunction secondary to Urinary Tract Infection. A review of systems was completed. Patient's sepsis is undergoing treatment     Current Antibiotics    , Daily, Intravenous  ertapenem (INVANZ) 1 g in 0.9% NaCl 100 mL IVPB (MB+), Every 24 hours (non-standard times), Intravenous  , Daily, Intravenous    Lactate  Recent Labs   Lab 07/05/25  1446 07/05/25  1751   LACTATE 2.3* 1.9     Culture Data  Blood Cultures   Blood Culture   Date Value Ref Range Status   07/05/2025 No Growth After 72 Hours  Preliminary   07/05/2025 No Growth After 72 Hours  Preliminary      Urine Culture   Urine Culture   Date Value Ref Range Status   07/05/2025 >100,000 cfu/ml Escherichia coli ESBL (A)  Final     Comment:     Corrected result: Previously reported as Gram-negative Rods on 7/8/2025 at 0438 CDT.     Urine Culture, Routine   Date Value Ref Range Status   02/22/2025 ESCHERICHIA COLI ESBL  >100,000 cfu/ml   (A)  Final      mSOFA  MSOFA Total  Min: 0   Min taken time: 07/09/25 1401  Max: 0   Max taken time: 07/09/25 1401    Plan  - Antibiotics as listed above  - Fluid resuscitation as follows:received 1L NS bolus in ED (limited due to hx of CHF). Lactate normalized with IV  "hydration.   - stop IV fluids   - Follow up culture data  - Vasopressors were not needed  - Cdif pending given reported diarrhea on admission        Hypokalemia  Patient's most recent potassium results are listed below.   Recent Labs     07/07/25  0555 07/08/25  0629   K 4.0 3.8     Plan  - Replete potassium per protocol  - Monitor potassium Daily  - Patient's hypokalemia is resolved  Prostate cancer  - followed by Urology Dr. Lassiter and Rad Onc as outapteint   - Currently on Darolutamide  - Hold oral chemo for now in setting of UTI     Chronic systolic (congestive) heart failure  Patient has Systolic (HFrEF) heart failure that is Chronic. On presentation their CHF was well compensated. Most recent BNP and echo results are listed below.  No results for input(s): "BNP" in the last 72 hours.    Latest ECHO  No results found for this or any previous visit.    Current Heart Failure Medications       Plan  - Monitor strict I&Os and daily weights.    - Place on telemetry  - Low sodium diet  - Place on fluid restriction of 1.5 L.   - Cardiology has not been consulted  - The patient's volume status is at their baseline  - Last known EF 20-25% in 2018, s/p AICD, followed by Cardiology Dr. Dyer as outpatient   - hold home entresto and lasix in setting of sepsis and hypotension, may resume in AM pending BP trends       Type 2 diabetes mellitus  Patient's FSGs are controlled on current medication regimen.  Last A1c reviewed-   Lab Results   Component Value Date    HGBA1C 5.8 (H) 07/06/2025     Most recent fingerstick glucose reviewed-   Recent Labs   Lab 07/08/25  1759 07/08/25  2224 07/09/25  0711   POCTGLUCOSE 150* 127* 117*     Current correctional scale  Medium  Maintain anti-hyperglycemic dose as follows-   Antihyperglycemics (From admission, onward)      Start     Stop Route Frequency Ordered    07/05/25 1630  insulin aspart U-100 pen 0-10 Units         -- SubQ Before meals & nightly PRN 07/05/25 1531        Continue " mod dose SSI   Repeat A1c pending   Monitor BG; hypoglycemia protocol   Hold Oral hypoglycemics while patient is in the hospital.  PERNELL (obstructive sleep apnea)  Not on CPAP   Wean Supplemental O2- maintain sats > 90%     Final Active Diagnoses:    Diagnosis Date Noted POA    PRINCIPAL PROBLEM:  Complicated UTI (urinary tract infection) [N39.0] 07/05/2025 Yes    Sepsis [A41.9] 07/05/2025 Yes    Hypokalemia [E87.6] 07/05/2025 Yes    Chronic systolic (congestive) heart failure [I50.22] 07/05/2025 Yes     Chronic    Type 2 diabetes mellitus [E11.9] 07/05/2025 Yes     Chronic    PERNELL (obstructive sleep apnea) [G47.33] 07/05/2025 Yes     Chronic    Prostate cancer [C61] 02/07/2025 Yes      Problems Resolved During this Admission:       Discharged Condition: fair    Disposition: Home or Self Care    Follow Up:   Contact information for follow-up providers       Ochoa Lane MD Follow up in 1 week(s).    Specialty: Family Medicine  Contact information:  [address] [facility] [id number] [phone]               Ron Lassiter MD Follow up in 1 week(s).    Specialty: Urology  Contact information:  [address]  [place] [address] [phone]               [facility] Follow up.    Specialties: Home Infusion and Injection Services, Infusion Clinic, Infusion Therapy  Contact information:  [address]  [address]  [place] [place] [address]                     Contact information for after-discharge care       Home Medical Care       [facility] .    Service: Home Health Services  Contact information:  [address]  [facility] [facility] [address] [phone]                                 Patient Instructions:      Ambulatory referral/consult to Urology   Standing Status: Future   Referral Priority: Routine Referral Type: Consultation   Referral Reason: Specialty Services Required   Requested  Specialty: Urology   Number of Visits Requested: 1       Significant Diagnostic Studies:   Results for orders placed or performed during the hospital encounter of 07/05/25   Urine culture    Collection Time: 07/05/25  1:43 PM    Specimen: Urine   Result Value Ref Range    Urine Culture >100,000 cfu/ml Escherichia coli ESBL (A)        Susceptibility    Escherichia coli ESBL - DOV     Ampicillin >16 Resistant µg/ml     Ampicillin/Sulbactam 16/8 Resistant µg/ml     Cefazolin >16 Resistant µg/ml     Cefepime >16 Resistant µg/ml     Ceftriaxone >2 Resistant µg/ml     Ciprofloxacin >2 Resistant µg/ml     Ertapenem <=0.5 Sensitive µg/ml     Gentamicin <=2 Sensitive µg/ml     Levofloxacin >4 Resistant µg/ml     Meropenem <=1 Sensitive µg/ml     Nitrofurantoin <=32 Sensitive µg/ml     Piperacillin/Tazobactam <=8 Resistant µg/ml     Tobramycin <=2 Sensitive µg/ml     Trimeth/Sulfa >2/38 Resistant µg/ml   Urinalysis, Reflex to Urine Culture Urine, Clean Catch    Collection Time: 07/05/25  1:43 PM    Specimen: Urine   Result Value Ref Range    Color, UA Yellow Straw, Lary, Yellow, Light-Orange    Appearance, UA Hazy (A) Clear    pH, UA 6.0 5.0 - 8.0    Spec Grav UA 1.010 1.005 - 1.030    Protein, UA 2+ (A) Negative    Glucose, UA 4+ (A) Negative    Ketones, UA Negative Negative    Bilirubin, UA Negative Negative    Blood, UA 3+ (A) Negative    Nitrites, UA Negative Negative    Urobilinogen, UA Negative <2.0 EU/dL    Leukocyte Esterase, UA 2+ (A) Negative   GREY TOP URINE HOLD    Collection Time: 07/05/25  1:43 PM   Result Value Ref Range    Extra Tube Hold for add-ons.    Urinalysis Microscopic    Collection Time: 07/05/25  1:43 PM   Result Value Ref Range    RBC, UA >100 (H) 0 - 4 /HPF    WBC, UA >100 (H) 0 - 5 /HPF    WBC Clumps, UA Many (A) None, Rare    Bacteria, UA Occasional None, Rare, Occasional /HPF    Yeast, UA None None /HPF    Hyaline Casts, UA 0 0 - 1 /LPF    Microscopic Comment     Comprehensive metabolic panel     Collection Time: 07/05/25  1:46 PM   Result Value Ref Range    Sodium 139 136 - 145 mmol/L    Potassium 2.9 (L) 3.5 - 5.1 mmol/L    Chloride 104 95 - 110 mmol/L    CO2 18 (L) 23 - 29 mmol/L    Glucose 120 (H) 70 - 110 mg/dL    BUN 18 8 - 23 mg/dL    Creatinine 1.2 0.5 - 1.4 mg/dL    Calcium 9.0 8.7 - 10.5 mg/dL    Protein Total 7.0 6.0 - 8.4 gm/dL    Albumin 3.6 3.5 - 5.2 g/dL    Bilirubin Total 0.8 0.1 - 1.0 mg/dL    ALP 61 40 - 150 unit/L    AST 43 11 - 45 unit/L    ALT 58 (H) 10 - 44 unit/L    Anion Gap 17 (H) 8 - 16 mmol/L    eGFR >60 >60 mL/min/1.73/m2   CBC with Differential    Collection Time: 07/05/25  1:46 PM   Result Value Ref Range    WBC 4.12 3.90 - 12.70 K/uL    RBC 3.95 (L) 4.60 - 6.20 M/uL    HGB 11.5 (L) 14.0 - 18.0 gm/dL    HCT 35.6 (L) 40.0 - 54.0 %    MCV 90 82 - 98 fL    MCH 29.1 27.0 - 31.0 pg    MCHC 32.3 32.0 - 36.0 g/dL    RDW 16.7 (H) 11.5 - 14.5 %    Platelet Count 118 (L) 150 - 450 K/uL    MPV 8.2 (L) 9.2 - 12.9 fL    Nucleated RBC 0 <=0 /100 WBC    Neut % 84.0 (H) 38 - 73 %    Lymph % 5.6 (L) 18 - 48 %    Mono % 9.7 4 - 15 %    Eos % 0.0 <=8 %    Basophil % 0.2 <=1.9 %    Imm Grans % 0.5 0.0 - 0.5 %    Neut # 3.46 1.8 - 7.7 K/uL    Lymph # 0.23 (L) 1 - 4.8 K/uL    Mono # 0.40 0.3 - 1 K/uL    Eos # 0.00 <=0.5 K/uL    Baso # 0.01 <=0.2 K/uL    Imm Grans # 0.02 0.00 - 0.04 K/uL   Prostate Specific Antigen, Diagnostic    Collection Time: 07/05/25  1:46 PM   Result Value Ref Range    Prostate Specific Antigen 0.06 <=4.00 ng/mL   Magnesium    Collection Time: 07/05/25  1:46 PM   Result Value Ref Range    Magnesium  1.5 (L) 1.6 - 2.6 mg/dL   EKG 12-lead    Collection Time: 07/05/25  1:56 PM   Result Value Ref Range    QRS Duration 100 ms    [facility] QTC Calculation 426 ms   Lactic acid, plasma    Collection Time: 07/05/25  2:46 PM   Result Value Ref Range    Lactic Acid Level 2.3 (H) 0.5 - 2.2 mmol/L   Brain natriuretic peptide    Collection Time: 07/05/25  3:09 PM   Result Value Ref Range    BNP  85 0 - 99 pg/mL   Blood culture    Collection Time: 07/05/25  3:21 PM    Specimen: Peripheral, Forearm, Left; Blood   Result Value Ref Range    Blood Culture No Growth After 72 Hours    Blood culture    Collection Time: 07/05/25  3:24 PM    Specimen: Peripheral, Hand, Right; Blood   Result Value Ref Range    Blood Culture No Growth After 72 Hours    POCT glucose    Collection Time: 07/05/25  5:46 PM   Result Value Ref Range    POCT Glucose 121 (H) 70 - 110 mg/dL   Lactic Acid, Plasma    Collection Time: 07/05/25  5:51 PM   Result Value Ref Range    Lactic Acid Level 1.9 0.5 - 2.2 mmol/L   POCT glucose    Collection Time: 07/05/25 11:51 PM   Result Value Ref Range    POCT Glucose 101 70 - 110 mg/dL   Basic Metabolic Panel (BMP)    Collection Time: 07/06/25  5:52 AM   Result Value Ref Range    Sodium 137 136 - 145 mmol/L    Potassium 3.7 3.5 - 5.1 mmol/L    Chloride 104 95 - 110 mmol/L    CO2 25 23 - 29 mmol/L    Glucose 97 70 - 110 mg/dL    BUN 11 8 - 23 mg/dL    Creatinine 1.1 0.5 - 1.4 mg/dL    Calcium 8.7 8.7 - 10.5 mg/dL    Anion Gap 8 8 - 16 mmol/L    eGFR >60 >60 mL/min/1.73/m2   Magnesium    Collection Time: 07/06/25  5:52 AM   Result Value Ref Range    Magnesium  2.1 1.6 - 2.6 mg/dL   Phosphorus    Collection Time: 07/06/25  5:52 AM   Result Value Ref Range    Phosphorus Level 3.5 2.7 - 4.5 mg/dL   Hemoglobin A1c    Collection Time: 07/06/25  5:52 AM   Result Value Ref Range    Hemoglobin A1c 5.8 (H) 4.0 - 5.6 %    Estimated Average Glucose 120 68 - 131 mg/dL   CBC with Differential    Collection Time: 07/06/25  5:52 AM   Result Value Ref Range    WBC 2.68 (L) 3.90 - 12.70 K/uL    RBC 3.52 (L) 4.60 - 6.20 M/uL    HGB 10.1 (L) 14.0 - 18.0 gm/dL    HCT 32.1 (L) 40.0 - 54.0 %    MCV 91 82 - 98 fL    MCH 28.7 27.0 - 31.0 pg    MCHC 31.5 (L) 32.0 - 36.0 g/dL    RDW 17.0 (H) 11.5 - 14.5 %    Platelet Count 158 150 - 450 K/uL    MPV 9.0 (L) 9.2 - 12.9 fL    Nucleated RBC 0 <=0 /100 WBC    Neut % 66.0 38 - 73 %     Lymph % 11.2 (L) 18 - 48 %    Mono % 21.6 (H) 4 - 15 %    Eos % 0.4 <=8 %    Basophil % 0.4 <=1.9 %    Imm Grans % 0.4 0.0 - 0.5 %    Neut # 1.77 (L) 1.8 - 7.7 K/uL    Lymph # 0.30 (L) 1 - 4.8 K/uL    Mono # 0.58 0.3 - 1 K/uL    Eos # 0.01 <=0.5 K/uL    Baso # 0.01 <=0.2 K/uL    Imm Grans # 0.01 0.00 - 0.04 K/uL   POCT glucose    Collection Time: 07/06/25  6:19 AM   Result Value Ref Range    POCT Glucose 110 70 - 110 mg/dL   POCT glucose    Collection Time: 07/06/25 11:46 AM   Result Value Ref Range    POCT Glucose 85 70 - 110 mg/dL   POCT glucose    Collection Time: 07/06/25  5:35 PM   Result Value Ref Range    POCT Glucose 169 (H) 70 - 110 mg/dL   POCT glucose    Collection Time: 07/06/25 10:18 PM   Result Value Ref Range    POCT Glucose 94 70 - 110 mg/dL   Basic Metabolic Panel (BMP)    Collection Time: 07/07/25  5:55 AM   Result Value Ref Range    Sodium 136 136 - 145 mmol/L    Potassium 4.0 3.5 - 5.1 mmol/L    Chloride 103 95 - 110 mmol/L    CO2 28 23 - 29 mmol/L    Glucose 101 70 - 110 mg/dL    BUN 11 8 - 23 mg/dL    Creatinine 1.0 0.5 - 1.4 mg/dL    Calcium 8.8 8.7 - 10.5 mg/dL    Anion Gap 5 (L) 8 - 16 mmol/L    eGFR >60 >60 mL/min/1.73/m2   Magnesium    Collection Time: 07/07/25  5:55 AM   Result Value Ref Range    Magnesium  1.9 1.6 - 2.6 mg/dL   Phosphorus    Collection Time: 07/07/25  5:55 AM   Result Value Ref Range    Phosphorus Level 3.0 2.7 - 4.5 mg/dL   CBC with Differential    Collection Time: 07/07/25  5:56 AM   Result Value Ref Range    WBC 2.58 (L) 3.90 - 12.70 K/uL    RBC 3.59 (L) 4.60 - 6.20 M/uL    HGB 10.4 (L) 14.0 - 18.0 gm/dL    HCT 32.7 (L) 40.0 - 54.0 %    MCV 91 82 - 98 fL    MCH 29.0 27.0 - 31.0 pg    MCHC 31.8 (L) 32.0 - 36.0 g/dL    RDW 17.1 (H) 11.5 - 14.5 %    Platelet Count 152 150 - 450 K/uL    MPV 9.7 9.2 - 12.9 fL    Nucleated RBC 0 <=0 /100 WBC    Neut % 72.5 38 - 73 %    Lymph % 11.2 (L) 18 - 48 %    Mono % 14.3 4 - 15 %    Eos % 0.4 <=8 %    Basophil % 0.4 <=1.9 %    Imm  Grans % 1.2 (H) 0.0 - 0.5 %    Neut # 1.87 1.8 - 7.7 K/uL    Lymph # 0.29 (L) 1 - 4.8 K/uL    Mono # 0.37 0.3 - 1 K/uL    Eos # 0.01 <=0.5 K/uL    Baso # 0.01 <=0.2 K/uL    Imm Grans # 0.03 0.00 - 0.04 K/uL   POCT glucose    Collection Time: 07/07/25  6:18 AM   Result Value Ref Range    POCT Glucose 104 70 - 110 mg/dL   POCT glucose    Collection Time: 07/07/25 11:37 AM   Result Value Ref Range    POCT Glucose 147 (H) 70 - 110 mg/dL   POCT glucose    Collection Time: 07/07/25  4:54 PM   Result Value Ref Range    POCT Glucose 123 (H) 70 - 110 mg/dL   POCT glucose    Collection Time: 07/07/25  5:18 PM   Result Value Ref Range    POCT Glucose 147 (H) 70 - 110 mg/dL   POCT glucose    Collection Time: 07/07/25  9:15 PM   Result Value Ref Range    POCT Glucose 166 (H) 70 - 110 mg/dL   POCT glucose    Collection Time: 07/08/25  5:55 AM   Result Value Ref Range    POCT Glucose 109 70 - 110 mg/dL   Basic Metabolic Panel (BMP)    Collection Time: 07/08/25  6:29 AM   Result Value Ref Range    Sodium 139 136 - 145 mmol/L    Potassium 3.8 3.5 - 5.1 mmol/L    Chloride 104 95 - 110 mmol/L    CO2 26 23 - 29 mmol/L    Glucose 107 70 - 110 mg/dL    BUN 9 8 - 23 mg/dL    Creatinine 1.0 0.5 - 1.4 mg/dL    Calcium 9.6 8.7 - 10.5 mg/dL    Anion Gap 9 8 - 16 mmol/L    eGFR >60 >60 mL/min/1.73/m2   Magnesium    Collection Time: 07/08/25  6:29 AM   Result Value Ref Range    Magnesium  1.9 1.6 - 2.6 mg/dL   Phosphorus    Collection Time: 07/08/25  6:29 AM   Result Value Ref Range    Phosphorus Level 2.8 2.7 - 4.5 mg/dL   CBC with Differential    Collection Time: 07/08/25  6:29 AM   Result Value Ref Range    WBC 2.47 (L) 3.90 - 12.70 K/uL    RBC 3.84 (L) 4.60 - 6.20 M/uL    HGB 11.0 (L) 14.0 - 18.0 gm/dL    HCT 35.3 (L) 40.0 - 54.0 %    MCV 92 82 - 98 fL    MCH 28.6 27.0 - 31.0 pg    MCHC 31.2 (L) 32.0 - 36.0 g/dL    RDW 17.3 (H) 11.5 - 14.5 %    Platelet Count 162 150 - 450 K/uL    MPV 9.4 9.2 - 12.9 fL    Nucleated RBC 0 <=0 /100 WBC     Neut % 66.0 38 - 73 %    Lymph % 9.7 (L) 18 - 48 %    Mono % 21.5 (H) 4 - 15 %    Eos % 1.6 <=8 %    Basophil % 0.4 <=1.9 %    Imm Grans % 0.8 (H) 0.0 - 0.5 %    Neut # 1.63 (L) 1.8 - 7.7 K/uL    Lymph # 0.24 (L) 1 - 4.8 K/uL    Mono # 0.53 0.3 - 1 K/uL    Eos # 0.04 <=0.5 K/uL    Baso # 0.01 <=0.2 K/uL    Imm Grans # 0.02 0.00 - 0.04 K/uL   Creatinine, serum    Collection Time: 07/08/25  6:29 AM   Result Value Ref Range    Creatinine 1.0 0.5 - 1.4 mg/dL    eGFR >60 >60 mL/min/1.73/m2   POCT glucose    Collection Time: 07/08/25  9:20 AM   Result Value Ref Range    POCT Glucose 167 (H) 70 - 110 mg/dL   POCT glucose    Collection Time: 07/08/25 12:26 PM   Result Value Ref Range    POCT Glucose 118 (H) 70 - 110 mg/dL   POCT glucose    Collection Time: 07/08/25  5:59 PM   Result Value Ref Range    POCT Glucose 150 (H) 70 - 110 mg/dL   POCT glucose    Collection Time: 07/08/25 10:24 PM   Result Value Ref Range    POCT Glucose 127 (H) 70 - 110 mg/dL   POCT glucose    Collection Time: 07/09/25  7:11 AM   Result Value Ref Range    POCT Glucose 117 (H) 70 - 110 mg/dL        Imaging Results              X-Ray Chest AP Portable (Final result)  Result time 07/05/25 15:19:59      Final result by Pelon Stanford MD (07/05/25 15:19:59)                   Impression:     No acute process.    Finalized on: 7/5/2025 3:19 PM By:  Pelon Stanford MD  [facility]# [id number]      2025-07-05 15:22:04.085     [facility]               Narrative:    EXAM:  XR CHEST AP PORTABLE    CLINICAL INDICATION: Weakness.    COMPARISON STUDY:  12/14/2024.    FINDINGS: No change.  Normal size heart.  Left chest wall AICD with intact leads.  No vascular congestion.  Lungs are clear.  Multilevel posterior cervical spinal fusion.  Small calcium deposit left rotator cuff footprint.                                         Pending Diagnostic Studies:       None           Medications:       Medication List        START taking these medications      * 0.9% NaCl PgBk 100 mL with  ertapenem 1 gram SolR 1 g  Inject 1 g into the vein once daily. for 6 days     * 0.9% NaCl PgBk 100 mL with ertapenem 1 gram SolR 1 g  Inject 1 g into the vein once daily. for 6 days     oxyCODONE-acetaminophen 5-325 mg per tablet  Commonly known as: PERCOCET  Take 1 tablet by mouth every 4 (four) hours as needed for Pain.     phenazopyridine 200 MG tablet  Commonly known as: PYRIDIUM  Take 1 tablet (200 mg total) by mouth 3 (three) times daily as needed for Pain.     solifenacin 5 MG tablet  Commonly known as: VESICARE  Take 1 tablet (5 mg total) by mouth once daily.           * This list has 2 medication(s) that are the same as other medications prescribed for you. Read the directions carefully, and ask your doctor or other care provider to review them with you.                CONTINUE taking these medications      amLODIPine 5 MG tablet  Commonly known as: NORVASC     busPIRone 5 MG Tab  Commonly known as: BUSPAR     clopidogreL 75 mg tablet  Commonly known as: PLAVIX     colchicine 0.6 mg tablet  Commonly known as: COLCRYS     dapagliflozin propanediol 10 mg tablet  Commonly known as: Farxiga     ENTRESTO 24-26 mg per tablet  Generic drug: sacubitriL-valsartan     furosemide 40 MG tablet  Commonly known as: LASIX     gabapentin 300 MG capsule  Commonly known as: NEURONTIN     JANUMET  mg per tablet  Generic drug: SITagliptan-metformin     NUBEQA 300 mg Tab  Generic drug: darolutamide  Take 2 tablets (600 mg) by mouth 2 (two) times a day.     sildenafiL 100 MG tablet  Commonly known as: VIAGRA  Take 1 tablet (100 mg total) by mouth daily as needed for Erectile Dysfunction.     tamsulosin 0.4 mg Cap  Commonly known as: FLOMAX  Take 1 capsule (0.4 mg total) by mouth 2 (two) times a day.     zolpidem 5 MG Tab  Commonly known as: AMBIEN            STOP taking these medications      ketoconazole 2 % cream  Commonly known as: NIZORAL               Where to Get Your Medications        These medications were sent  to [facility]  [address] [place] [place] [address]      Hours: Mon-Fri, 8a-5:30p Phone: [phone]   oxyCODONE-acetaminophen 5-325 mg per tablet  phenazopyridine 200 MG tablet  solifenacin 5 MG tablet       Information about where to get these medications is not yet available    Ask your nurse or doctor about these medications  0.9% NaCl PgBk 100 mL with ertapenem 1 gram SolR 1 g  0.9% NaCl PgBk 100 mL with ertapenem 1 gram SolR 1 g          Indwelling Lines/Drains at time of discharge:   Lines/Drains/Airways       Drain  Duration                  Urethral Catheter 07/05/25 1245 Coude;Latex 20 Fr. 4 days                        Time spent on the discharge of patient: 91 minutes         Francisco Ayala MD  Department of Hospital Medicine  [facility] S[facility]

## 2025-07-09 NOTE — PLAN OF CARE
Pt to be discharged to home with [facility]. Follow ups to be scheduled. PIV and tele monitor removed. Discharge papers given and explained. Meds delivered bedside. Pt and family have no questions or concerns at this time.

## 2025-07-09 NOTE — PROGRESS NOTES
[facility] - Med Surg  Infectious Disease  Progress Note    Patient Name: Hector Smith  MRN: [id number]  Admission Date: 7/5/2025  Length of Stay: 4 days  Attending Physician: Francisco Ayala,*  Primary Care Provider: Ochoa Lane MD    Isolation Status: Contact  Assessment/Plan:      Renal/  * Complicated UTI (urinary tract infection)  We will continue meropenem.  Follow repeat urine culture to guide treatment..  Previous urine culture-  07/05- ESBL E coli    07/08-  He had recurrent ESBL E coli UTI  Urine culture 02/22/2025 E coli   urine culture 0 6/1225 E coli   urine culture 0706 E coli  This trends are all ESBL.  Due to recurrent nature we will plan to complete 7 days of ertapenem  Please send referral to [facility].     1) Infection: -recurrent UTI      2) Discharge Antibiotics:     Intravenous antibiotics:  IV Ertapenem one gram daily      3) Therapy Duration:  7 days      Estimated end date of IV antibiotics: 07/15     4) Outpatient Weekly Labs:     Order the following labs to be drawn on Mondays:   CBC  CMP   CPK (when on Daptomycin)  ESR  CRP  -Can pull picc line at end of treatment unless instructed otherwise .   Please send all labs to [facility] .          Endocrine  Type 2 diabetes mellitus  Insulin regimen as per primary team        Anticipated Disposition:     Thank you for your consult. I will follow-up with patient. Please contact us if you have any additional questions.    Haroldo Nix MD, [facility]  Infectious Disease  [facility] - Med Surg    Subjective:     Principal Problem:Complicated UTI (urinary tract infection)    HPI: 64-year-old  male with past medical history notable for chronic systolic heart failure (EF 20-25%) status post AICD, type 2 diabetes mellitus, essential hypertension, carotid disease, prostate cancer (on treatment) who presents to the ED on 07/05/2025.  He was admitted for difficulty with passing urine.  There is associated history of watery diarrhea.   "Since admission he was seen by Urology and a 20 [place]ian coude was used.  Labs and imaging  test showed UTI.  Previous urine culture 0612 ESBL E coli  Culture 0705 Gram-negative rods  .  CBC shows normal WBC      Interval History:   He is  tolerating medications.  Denies fever or chills.  CT of the pelvis did not show any acute prostate abscess    Review of Systems   Constitutional:  Negative for activity change, appetite change, chills, diaphoresis and fatigue.   Neurological:  Negative for dizziness and facial asymmetry.     Objective:     Vital Signs (Most Recent):  Temp: 97.8 °F (36.6 °C) (07/09/25 0500)  Pulse: 70 (07/09/25 0500)  Resp: 18 (07/09/25 0500)  BP: 138/79 (07/09/25 0500)  SpO2: 97 % (07/09/25 0500) Vital Signs (24h Range):  Temp:  [97.4 °F (36.3 °C)-98.5 °F (36.9 °C)] 97.8 °F (36.6 °C)  Pulse:  [68-73] 70  Resp:  [13-19] 18  SpO2:  [96 %-98 %] 97 %  BP: (130-154)/(76-87) 138/79     Weight: 83.8 kg (184 lb 11.9 oz)  Body mass index is 33.79 kg/m².    Estimated Creatinine Clearance: 68.1 mL/min (based on SCr of 1 mg/dL).     Physical Exam  Vitals and nursing note reviewed.   HENT:      Head: Normocephalic.   Musculoskeletal:         General: Normal range of motion.   Skin:     General: Skin is warm.   Neurological:      General: No focal deficit present.      Mental Status: He is alert.   Psychiatric:         Mood and Affect: Mood normal.          Significant Labs: Blood Culture: No results for input(s): "LABBLOO" in the last 4320 hours.  BMP:   Recent Labs   Lab 07/08/25  0629         K 3.8      CO2 26   BUN 9   CREATININE 1.0  1.0   CALCIUM 9.6   MG 1.9     CBC:   Recent Labs   Lab 07/08/25 0629   WBC 2.47*   HGB 11.0*   HCT 35.3*        CMP:   Recent Labs   Lab 07/08/25  0629      K 3.8      CO2 26      BUN 9   CREATININE 1.0  1.0   CALCIUM 9.6   ANIONGAP 9     Microbiology Results (last 7 days)       Procedure Component Value Units Date/Time    Blood " culture [[id number]]  (Normal) Collected: 07/05/25 1521    Order Status: Completed Specimen: Blood from Peripheral, Forearm, Left Updated: 07/09/25 0204     Blood Culture No Growth After 72 Hours    Blood culture [[id number]]  (Normal) Collected: 07/05/25 1524    Order Status: Completed Specimen: Blood from Peripheral, Hand, Right Updated: 07/09/25 0204     Blood Culture No Growth After 72 Hours    Urine culture [[id number]]  (Abnormal)  (Susceptibility) Collected: 07/05/25 1343    Order Status: Completed Specimen: Urine Updated: 07/08/25 1314     Urine Culture >100,000 cfu/ml Escherichia coli ESBL     Comment: Corrected result: Previously reported as Gram-negative Rods on 7/8/2025 at 0438 CDT.       Clostridium difficile EIA [[id number]] Collected: 07/06/25 2054    Order Status: Canceled Specimen: Stool Updated: 07/06/25 2103            All pertinent labs within the past 24 hours have been reviewed.    Significant Imaging: I have reviewed all pertinent imaging results/findings within the past 24 hours.

## 2025-07-10 ENCOUNTER — INFUSION (OUTPATIENT)
Dept: INFUSION THERAPY | Facility: HOSPITAL | Age: 65
End: 2025-07-10
Attending: STUDENT IN AN ORGANIZED HEALTH CARE EDUCATION/TRAINING PROGRAM
Payer: MEDICARE

## 2025-07-10 ENCOUNTER — TELEPHONE (OUTPATIENT)
Dept: UROLOGY | Facility: CLINIC | Age: 65
End: 2025-07-10
Payer: MEDICARE

## 2025-07-10 VITALS
TEMPERATURE: 97 F | SYSTOLIC BLOOD PRESSURE: 107 MMHG | DIASTOLIC BLOOD PRESSURE: 60 MMHG | HEART RATE: 76 BPM | OXYGEN SATURATION: 95 % | RESPIRATION RATE: 16 BRPM

## 2025-07-10 DIAGNOSIS — C61 PROSTATE CANCER: Primary | ICD-10-CM

## 2025-07-10 PROCEDURE — 25000003 PHARM REV CODE 250: Performed by: HOSPITALIST

## 2025-07-10 PROCEDURE — 96360 HYDRATION IV INFUSION INIT: CPT

## 2025-07-10 RX ORDER — HEPARIN 100 UNIT/ML
500 SYRINGE INTRAVENOUS
OUTPATIENT
Start: 2025-07-10

## 2025-07-10 RX ORDER — SODIUM CHLORIDE 0.9 % (FLUSH) 0.9 %
10 SYRINGE (ML) INJECTION
OUTPATIENT
Start: 2025-07-10

## 2025-07-10 RX ADMIN — SODIUM CHLORIDE 1000 ML: 9 INJECTION, SOLUTION INTRAVENOUS at 11:07

## 2025-07-10 NOTE — PLAN OF CARE
Problem: Adult Inpatient Plan of Care  Goal: Plan of Care Review  Outcome: Progressing  Flowsheets (Taken 7/10/2025 6265)  Plan of Care Reviewed With:   patient   spouse  Goal: Absence of Hospital-Acquired Illness or Injury  Outcome: Progressing  Goal: Optimal Comfort and Wellbeing  Outcome: Progressing     Problem: Fatigue  Goal: Improved Activity Tolerance  Outcome: Progressing

## 2025-07-10 NOTE — TELEPHONE ENCOUNTER
Called pt and left vm informing him that his appt with Ms. Jimenez has been rescheduled from Tues to Wed so he will be able to consult with Dr. Lassiter.

## 2025-07-11 LAB
BACTERIA BLD CULT: NORMAL
BACTERIA BLD CULT: NORMAL

## 2025-07-14 ENCOUNTER — CLINICAL SUPPORT (OUTPATIENT)
Dept: UROLOGY | Facility: CLINIC | Age: 65
End: 2025-07-14
Payer: MEDICARE

## 2025-07-14 ENCOUNTER — TELEPHONE (OUTPATIENT)
Dept: UROLOGY | Facility: CLINIC | Age: 65
End: 2025-07-14

## 2025-07-14 DIAGNOSIS — R33.8 ACUTE URINARY RETENTION: Primary | ICD-10-CM

## 2025-07-14 NOTE — PROGRESS NOTES
INDWELLING CATHETER REMOVAL: VOIDING TRIAL     Patient placed on table in supine position. Catheter bag seen draining slightly cloudy orange urine. Catheter removed from bag. Inserted 200cc sterile water into patient's bladder. Patient with the urge to urinate. 20cc sterile water deflated from catheter balloon. Patient voided 220cc slightly cloudy orange urine into urinal. No blood seen on catheter or no blood clots in urine. Patient tolerated well, no further assistance needed.       JERE Cunningham, CMA

## 2025-07-14 NOTE — TELEPHONE ENCOUNTER
Copied from CRM #[id number]. Topic: General Inquiry - Patient Advice  >> Jul 14, 2025 10:07 AM Jessika wrote:  Type:  Patient Returning Call    Who Called:Hector  Who Left Message for Patient:not sure  Does the patient know what this is regarding?:IV removal and blood draw  Would the patient rather a call back or a response via [facility]? Call back   Best Call Back Number:[phone]  Additional Information: home health

## 2025-07-14 NOTE — TELEPHONE ENCOUNTER
Spoke with patient who was able to provide acceptable patient identifiers prior to start of conversation. Patient instructed to contact home health reference to lab draw and mid-line removal. Patient verbalized understanding.

## 2025-07-15 ENCOUNTER — PATIENT MESSAGE (OUTPATIENT)
Dept: UROLOGY | Facility: CLINIC | Age: 65
End: 2025-07-15
Payer: MEDICARE

## 2025-07-15 ENCOUNTER — LAB REQUISITION (OUTPATIENT)
Dept: LAB | Facility: HOSPITAL | Age: 65
End: 2025-07-15
Payer: MEDICARE

## 2025-07-15 DIAGNOSIS — E87.6 HYPOKALEMIA: ICD-10-CM

## 2025-07-15 DIAGNOSIS — G47.33 OBSTRUCTIVE SLEEP APNEA (ADULT) (PEDIATRIC): ICD-10-CM

## 2025-07-15 DIAGNOSIS — Z47.1 AFTERCARE FOLLOWING JOINT REPLACEMENT SURGERY: ICD-10-CM

## 2025-07-15 DIAGNOSIS — E11.9 TYPE 2 DIABETES MELLITUS WITHOUT COMPLICATIONS: ICD-10-CM

## 2025-07-15 DIAGNOSIS — A41.9 SEPSIS, UNSPECIFIED ORGANISM: ICD-10-CM

## 2025-07-15 DIAGNOSIS — N39.0 URINARY TRACT INFECTION, SITE NOT SPECIFIED: ICD-10-CM

## 2025-07-15 LAB
ABSOLUTE EOSINOPHIL (OHS): 0.03 K/UL
ABSOLUTE MONOCYTE (OHS): 0.32 K/UL (ref 0.3–1)
ABSOLUTE NEUTROPHIL COUNT (OHS): 1.75 K/UL (ref 1.8–7.7)
ALBUMIN SERPL BCP-MCNC: 3.7 G/DL (ref 3.5–5.2)
ALP SERPL-CCNC: 67 UNIT/L (ref 40–150)
ALT SERPL W/O P-5'-P-CCNC: 27 UNIT/L (ref 10–44)
ANION GAP (OHS): 12 MMOL/L (ref 8–16)
AST SERPL-CCNC: 22 UNIT/L (ref 11–45)
BASOPHILS # BLD AUTO: 0.02 K/UL
BASOPHILS NFR BLD AUTO: 0.8 %
BILIRUB SERPL-MCNC: 0.5 MG/DL (ref 0.1–1)
BUN SERPL-MCNC: 12 MG/DL (ref 8–23)
CALCIUM SERPL-MCNC: 9.2 MG/DL (ref 8.7–10.5)
CHLORIDE SERPL-SCNC: 100 MMOL/L (ref 95–110)
CO2 SERPL-SCNC: 30 MMOL/L (ref 23–29)
CREAT SERPL-MCNC: 1.3 MG/DL (ref 0.5–1.4)
CRP SERPL-MCNC: 7 MG/L
ERYTHROCYTE [DISTWIDTH] IN BLOOD BY AUTOMATED COUNT: 17.2 % (ref 11.5–14.5)
ERYTHROCYTE [SEDIMENTATION RATE] IN BLOOD BY PHOTOMETRIC METHOD: 41 MM/HR
GFR SERPLBLD CREATININE-BSD FMLA CKD-EPI: >60 ML/MIN/1.73/M2
GLUCOSE SERPL-MCNC: 56 MG/DL (ref 70–110)
HCT VFR BLD AUTO: 35 % (ref 40–54)
HGB BLD-MCNC: 10.9 GM/DL (ref 14–18)
IMM GRANULOCYTES # BLD AUTO: 0.01 K/UL (ref 0–0.04)
IMM GRANULOCYTES NFR BLD AUTO: 0.4 % (ref 0–0.5)
LYMPHOCYTES # BLD AUTO: 0.45 K/UL (ref 1–4.8)
MCH RBC QN AUTO: 28.9 PG (ref 27–31)
MCHC RBC AUTO-ENTMCNC: 31.1 G/DL (ref 32–36)
MCV RBC AUTO: 93 FL (ref 82–98)
NUCLEATED RBC (/100WBC) (OHS): 0 /100 WBC
PLATELET # BLD AUTO: 191 K/UL (ref 150–450)
PMV BLD AUTO: 9.1 FL (ref 9.2–12.9)
POTASSIUM SERPL-SCNC: 3.1 MMOL/L (ref 3.5–5.1)
PROT SERPL-MCNC: 6.9 GM/DL (ref 6–8.4)
RBC # BLD AUTO: 3.77 M/UL (ref 4.6–6.2)
RELATIVE EOSINOPHIL (OHS): 1.2 %
RELATIVE LYMPHOCYTE (OHS): 17.4 % (ref 18–48)
RELATIVE MONOCYTE (OHS): 12.4 % (ref 4–15)
RELATIVE NEUTROPHIL (OHS): 67.8 % (ref 38–73)
SODIUM SERPL-SCNC: 142 MMOL/L (ref 136–145)
WBC # BLD AUTO: 2.58 K/UL (ref 3.9–12.7)

## 2025-07-15 PROCEDURE — 85025 COMPLETE CBC W/AUTO DIFF WBC: CPT | Performed by: INTERNAL MEDICINE

## 2025-07-15 PROCEDURE — 85652 RBC SED RATE AUTOMATED: CPT | Performed by: INTERNAL MEDICINE

## 2025-07-15 PROCEDURE — 80053 COMPREHEN METABOLIC PANEL: CPT | Performed by: INTERNAL MEDICINE

## 2025-07-15 PROCEDURE — 86140 C-REACTIVE PROTEIN: CPT | Performed by: INTERNAL MEDICINE

## 2025-07-18 ENCOUNTER — OFFICE VISIT (OUTPATIENT)
Dept: UROLOGY | Facility: CLINIC | Age: 65
End: 2025-07-18
Payer: MEDICARE

## 2025-07-18 VITALS
HEART RATE: 81 BPM | BODY MASS INDEX: 34 KG/M2 | DIASTOLIC BLOOD PRESSURE: 70 MMHG | HEIGHT: 62 IN | WEIGHT: 184.75 LBS | RESPIRATION RATE: 16 BRPM | SYSTOLIC BLOOD PRESSURE: 123 MMHG

## 2025-07-18 DIAGNOSIS — N13.8 BENIGN PROSTATIC HYPERPLASIA WITH URINARY OBSTRUCTION: ICD-10-CM

## 2025-07-18 DIAGNOSIS — N40.1 BENIGN PROSTATIC HYPERPLASIA WITH URINARY OBSTRUCTION: ICD-10-CM

## 2025-07-18 DIAGNOSIS — R33.8 ACUTE URINARY RETENTION: Primary | ICD-10-CM

## 2025-07-18 DIAGNOSIS — R33.9 URINE RETENTION: ICD-10-CM

## 2025-07-18 DIAGNOSIS — C61 PROSTATE CANCER: ICD-10-CM

## 2025-07-18 PROCEDURE — 99999 PR PBB SHADOW E&M-EST. PATIENT-LVL IV: CPT | Mod: PBBFAC,,, | Performed by: UROLOGY

## 2025-07-18 NOTE — PROGRESS NOTES
Chief Complaint:   Encounter Diagnoses   Name Primary?    Prostate cancer     Urine retention     Acute urinary retention Yes    Benign prostatic hyperplasia with urinary obstruction        HPI:    7/18/25- currently voiding well, slight dysuria but no signs of acute retention.  No hematuria or dysuria.  Patient is scheduled for his next Lupron.    64-year-old gentleman who comes in with the acute urinary retention requiring Rodrigues catheterizations.  Apparently patient had an episode of this proximally 2 weeks ago, after 4 days the catheter was removed by the outside urologist and he was voiding well.  Prior to this no real complaints, only getting up 1 time per night.  No significant lower urinary tract symptoms on daily tamsulosin.  No dysuria, no gross hematuria, no microscopic hematuria, he does have a remote history of smoking.  Patient has no family history of urological cancers, his mother did have stones though.  Patient has had no previous urological history.  Unfortunately just a few days ago he again went into acute urinary retention requiring Rodrigues catheter.  Postvoid residual was 921 mL.  Patient since that time has continued to have with a Rodrigues catheter would like her to be removed, he is up to his tamsulosin to b.i.d. of note.  Patient does take Viagra 100 mg on occasion, no issues.  Patient is concerned that he did try a different alcoholic beverage and this could be related.    Allergies:  Adhesive and Suture, silk    Medications:  See MAR    Review of Systems:  General: No fever, chills, fatigability, or weight loss.  Skin: No rashes, itching, or changes in color or texture of skin.  Chest: Denies TAMEZ, cyanosis, wheezing, cough, and sputum production.  Abdomen: Appetite fine. No weight loss. Denies diarrhea, abdominal pain, hematemesis, or blood in stool.  Musculoskeletal: No joint stiffness or swelling. Denies back pain.  : As above.  All other review of systems negative.    PMH:   has a past  medical history of Diabetes mellitus, type 2, Hyperlipidemia, and Hypertension.    PSH:   has a past surgical history that includes Spinal fusion (N/A, 2019).    FamHx: family history is not on file.    SocHx:  reports that he has quit smoking. He has never used smokeless tobacco. He reports that he does not drink alcohol and does not use drugs.      Physical Exam:  Vitals:    12/12/24 1420   BP: 109/70   Pulse: 70     General: A&Ox3, no apparent distress, no deformities  Neck: No masses, normal ROM  Lungs: normal inspiration, no use of accessory muscles  Heart: normal pulse, no arrhythmias  Abdomen: Soft, NT, ND, no masses, no hernias, no hepatosplenomegaly  Skin: The skin is warm and dry. No jaundice.  Ext: No c/c/e.  : 12/24- Test desc sarah beth, no abnormalities of epididymus. Normal penile and scrotal skin. Meatus normal.    Labs/Studies:   PVR ml 7/25  Urine culture E coli 7/25  Fiducials 4/4/25  TURP Gl9 1/25  Voiding trial 130 mL instilled, 200 mL voided, PVR 42 mL 12/4/24  Cystoscopy lateral lobe coaptation, small median lobe 12/24  PSA 0.06 7/25  PSA 0.91 3/25  PSA 2.66 3/25  PSA 2.9 7/24  PSA 2.6 7/23  PET-CT known site within the prostate, multiple pelvic and retroperitoneal avid lymph nodes, possible involvement right seminal vesicle 1/25    Impression/Plan:      1. BPH with obstruction-  TURP  1/6/25    Patient is back to voiding well without the catheter on tamsulosin, call if he has any other evidence of retention.  Reassess in 1 month and pursue as appropriate for surveillance screening after that.  Will continue Pyridium for dysuria, stop the VESIcare if he has other issues of retention.    2. Prostate cancer-  XRT  6/25,  darolutamide  3/25, Lupron  2/13/25    New diagnosis of high-risk prostatic adenocarcinoma, PET-CT is suspicious for davi and seminal vesicle involvement.  Urology Oncology initiated darolutamide, Radiation Oncology to plan for external beam radiation therapy.  Patient due for his  next Lupron dosage soon, continue to follow with Oncology and Radiation Oncology.  Call with any other issues prior to the next appointment.    3. Erectile dysfunction- Viagra 100 mg works well, currently not an issue.

## 2025-07-22 ENCOUNTER — OFFICE VISIT (OUTPATIENT)
Dept: RADIATION ONCOLOGY | Facility: CLINIC | Age: 65
End: 2025-07-22
Payer: MEDICARE

## 2025-07-22 VITALS
OXYGEN SATURATION: 97 % | HEART RATE: 75 BPM | TEMPERATURE: 98 F | RESPIRATION RATE: 18 BRPM | BODY MASS INDEX: 32.78 KG/M2 | SYSTOLIC BLOOD PRESSURE: 117 MMHG | HEIGHT: 62 IN | WEIGHT: 178.13 LBS | DIASTOLIC BLOOD PRESSURE: 70 MMHG

## 2025-07-22 DIAGNOSIS — C61 PROSTATE CANCER: Primary | ICD-10-CM

## 2025-07-22 PROCEDURE — 1101F PT FALLS ASSESS-DOCD LE1/YR: CPT | Mod: CPTII,S$GLB,, | Performed by: SPECIALIST

## 2025-07-22 PROCEDURE — 4010F ACE/ARB THERAPY RXD/TAKEN: CPT | Mod: CPTII,S$GLB,, | Performed by: SPECIALIST

## 2025-07-22 PROCEDURE — 3078F DIAST BP <80 MM HG: CPT | Mod: CPTII,S$GLB,, | Performed by: SPECIALIST

## 2025-07-22 PROCEDURE — 3288F FALL RISK ASSESSMENT DOCD: CPT | Mod: CPTII,S$GLB,, | Performed by: SPECIALIST

## 2025-07-22 PROCEDURE — 3044F HG A1C LEVEL LT 7.0%: CPT | Mod: CPTII,S$GLB,, | Performed by: SPECIALIST

## 2025-07-22 PROCEDURE — 99999 PR PBB SHADOW E&M-EST. PATIENT-LVL IV: CPT | Mod: PBBFAC,,, | Performed by: SPECIALIST

## 2025-07-22 PROCEDURE — 1159F MED LIST DOCD IN RCRD: CPT | Mod: CPTII,S$GLB,, | Performed by: SPECIALIST

## 2025-07-22 PROCEDURE — 99024 POSTOP FOLLOW-UP VISIT: CPT | Mod: S$GLB,,, | Performed by: SPECIALIST

## 2025-07-22 PROCEDURE — 3074F SYST BP LT 130 MM HG: CPT | Mod: CPTII,S$GLB,, | Performed by: SPECIALIST

## 2025-07-22 PROCEDURE — 1126F AMNT PAIN NOTED NONE PRSNT: CPT | Mod: CPTII,S$GLB,, | Performed by: SPECIALIST

## 2025-07-22 NOTE — PROGRESS NOTES
[facility] / [facility] - Radiation Oncology Follow Up Note    HISTORY OF PRESENT ILLNESS 02/18/2025: 64-year-old man with a high-risk T3 B N1 M0 prostate cancer with a PSA of of 2.9 and 4+5 disease recovered at TURP for outlet obstructive complaints.    His  PSA yuri to 2.9 on 07/11/2024, up from 2.61 year prior     Contrasted CT of the abdomen and pelvis on 09/12/2024 during a workup for PE showed diffusely enlarged nodular heterogeneous prostate with nonspecific prominent lymph nodes in the left common iliac and external iliac region, with mild bladder wall thickening and minimal adjacent stranding, nonspecific     He experienced acute retention requiring Rodrigues placement with 921 cc PVR.     He underwent TURP on 01/06/2025 for outlet obstructive symptoms with submitted tissue recovering 4+5 disease involving 11-20% of the entirely submitted specimen.  Perineural invasion is appreciated.  Lymphovascular invasion is appreciated     PSMA PET on 01/29/2025 showed ill-defined masslike nodularity in the left inferior prostate with suspected involvement of the right seminal vesicle and multiple pelvic and retroperitoneal avid lymph nodes.  Air in the bladder, correlate with recent instrumentation versus possible rectovesicular fistula          Lupron was 1st delivered on 02/13/2025, a six-month injection        REVIEW OF SYSTEMS 02/18/2025:  He took Flomax prior to his TURP in his no longer taking any urologic medication, describing overall resulting improved function with an AUA score of 19.  For me today he reports improved but still suboptimal stream that tapers at the end of urination, with daytime frequency every 1-1.5 hours, nocturia once per evening, and small volume urinary continence when he passes gas not requiring use of a pad or change of shorts.  He denies hesitancy, hematuria, dysuria, daytime urgency, new bone pain, or new GI complaints.  He has chronic constipation not requiring  medical intervention.  He reports good erectile function with an IIEF score of 17, benefitted by 100 mg Viagra, and is sexually active.     He works as a  without physical limitation in otherwise has a negative multisystem review    Treatment summary:  Lupron was 1st delivered on 02/13/2025, a six-month injection.  He was treated in 28 fractions, the prostate to 70 Gy, gross pelvic lymph nodes to 64.4 Gy, and elective pelvic lymph nodes to 50.4 Gy in 28 2.5, 2.3, and 1.8 Gy fractions, respectively, from 05/12/2025 through 06/18/2025       2.9 07/14/2024 02/13/2025 six-month Lupron delivered, darolutamide/Nubeqa   06/18/2025 radiotherapy complete  0.06 07/05/2025          INTERVAL HISTORY:  He presents for routine short interval follow up.  He had tolerated therapy with some difficulty, developing LUTS sufficient for doubling of his Flomax and use of Advil, initially with some improvement but ultimately with progression.  UA showed +blood and WBCs and he was given a presumptive course of Bactrim with prompt relief despite cultures growing E coli suggesting Bactrim resistance.    Since treatment, he developed urinary retention requiring catheter placement and treatment with ertapenem, discharged on 07/09/2025 on twice daily Flomax.  When seen by Dr. Lassiter on 07/18/2025, he was voiding well with slight dysuria.  Today he reports similar complaints with ongoing but improving dysuria controlled with Pyridium.  He continues twice daily Flomax.  He has nocturia twice per evening, baseline of 1, and resolving daytime urgency without urge incontinence.  He denies weak stream, hesitancy, hematuria, daytime frequency less than 2 hours, or new bone pain.  He has occasional constipation controlled with PRN Dulcolax without other GI complaints.  He has persistent endocrine induced ED.      PHYSICAL EXAMINATION:  Vitals:    07/22/25 0941   BP: 117/70   Pulse: 75   Resp: 18   Temp: 97.5 °F (36.4 °C)   SpO2: 97%  "  Weight: 80.8 kg (178 lb 2.1 oz)   Height: 5' 2" (1.575 m)      General:  A&O x4, NAD  Lungs:  CTAB  Heart:  RRR  Abdomen:  NTND +BS      ASSESSMENT:  Following about of retention requiring catheterization and antibiotics for UTI, he has near-complete resolution of LUTS with ongoing twice daily Flomax and Pyridium.  Endocrine therapy ongoing      PLAN:  We discussed anticipated PSA dynamics before during and after testosterone recovery.  I recommend continued endocrine therapy for a total duration of 2 years in light of his advanced disease.  He maintains follow up with Dr. Lassiter were endocrine therapy is delivered.  Follow up here in 1 year      "

## 2025-07-29 ENCOUNTER — APPOINTMENT (OUTPATIENT)
Dept: RADIOLOGY | Facility: HOSPITAL | Age: 65
End: 2025-07-29
Attending: HOSPITALIST
Payer: MEDICARE

## 2025-07-29 DIAGNOSIS — Z79.899 LONG TERM CURRENT USE OF THERAPEUTIC DRUG: ICD-10-CM

## 2025-07-29 DIAGNOSIS — C61 PROSTATE CANCER: ICD-10-CM

## 2025-07-29 DIAGNOSIS — Z79.818 LONG TERM (CURRENT) USE OF OTHER AGENTS AFFECTING ESTROGEN RECEPTORS AND ESTROGEN LEVELS: ICD-10-CM

## 2025-07-29 PROCEDURE — 77080 DXA BONE DENSITY AXIAL: CPT | Mod: TC

## 2025-07-29 PROCEDURE — 77080 DXA BONE DENSITY AXIAL: CPT | Mod: 26,,, | Performed by: STUDENT IN AN ORGANIZED HEALTH CARE EDUCATION/TRAINING PROGRAM

## 2025-07-29 RX ORDER — SOLIFENACIN SUCCINATE 5 MG/1
5 TABLET, FILM COATED ORAL DAILY
Qty: 30 TABLET | Refills: 11 | Status: SHIPPED | OUTPATIENT
Start: 2025-07-29 | End: 2026-07-29

## 2025-07-29 NOTE — TELEPHONE ENCOUNTER
Please see the attached refill request. Last visit and refill 07/2025, only 30 days with no refills.

## 2025-08-01 ENCOUNTER — LAB VISIT (OUTPATIENT)
Dept: LAB | Facility: HOSPITAL | Age: 65
End: 2025-08-01
Attending: HOSPITALIST
Payer: MEDICARE

## 2025-08-01 ENCOUNTER — OFFICE VISIT (OUTPATIENT)
Dept: HEMATOLOGY/ONCOLOGY | Facility: CLINIC | Age: 65
End: 2025-08-01
Payer: MEDICARE

## 2025-08-01 ENCOUNTER — TELEPHONE (OUTPATIENT)
Dept: HEMATOLOGY/ONCOLOGY | Facility: CLINIC | Age: 65
End: 2025-08-01
Payer: MEDICARE

## 2025-08-01 ENCOUNTER — INFUSION (OUTPATIENT)
Dept: INFUSION THERAPY | Facility: HOSPITAL | Age: 65
End: 2025-08-01
Attending: HOSPITALIST
Payer: MEDICARE

## 2025-08-01 VITALS
RESPIRATION RATE: 16 BRPM | HEART RATE: 69 BPM | DIASTOLIC BLOOD PRESSURE: 69 MMHG | BODY MASS INDEX: 32.88 KG/M2 | SYSTOLIC BLOOD PRESSURE: 121 MMHG | OXYGEN SATURATION: 97 % | TEMPERATURE: 98 F | HEIGHT: 62 IN | WEIGHT: 178.69 LBS

## 2025-08-01 VITALS
HEIGHT: 62 IN | HEART RATE: 69 BPM | WEIGHT: 178.69 LBS | BODY MASS INDEX: 32.88 KG/M2 | DIASTOLIC BLOOD PRESSURE: 69 MMHG | TEMPERATURE: 98 F | OXYGEN SATURATION: 97 % | SYSTOLIC BLOOD PRESSURE: 121 MMHG

## 2025-08-01 DIAGNOSIS — E11.9 TYPE 2 DIABETES MELLITUS WITHOUT COMPLICATION, WITHOUT LONG-TERM CURRENT USE OF INSULIN: Chronic | ICD-10-CM

## 2025-08-01 DIAGNOSIS — C61 PROSTATE CANCER: Primary | ICD-10-CM

## 2025-08-01 DIAGNOSIS — I10 ESSENTIAL HYPERTENSION: ICD-10-CM

## 2025-08-01 DIAGNOSIS — C61 PROSTATE CANCER: ICD-10-CM

## 2025-08-01 DIAGNOSIS — I50.9 CONGESTIVE HEART FAILURE, UNSPECIFIED HF CHRONICITY, UNSPECIFIED HEART FAILURE TYPE: ICD-10-CM

## 2025-08-01 DIAGNOSIS — Z79.899 LONG TERM CURRENT USE OF THERAPEUTIC DRUG: ICD-10-CM

## 2025-08-01 PROBLEM — A41.9 SEPSIS: Status: RESOLVED | Noted: 2025-07-05 | Resolved: 2025-08-01

## 2025-08-01 LAB
ABSOLUTE NEUTROPHIL COUNT (OHS): 0.97 K/UL (ref 1.8–7.7)
ALBUMIN SERPL BCP-MCNC: 3.8 G/DL (ref 3.5–5.2)
ALP SERPL-CCNC: 81 UNIT/L (ref 40–150)
ALT SERPL W/O P-5'-P-CCNC: 26 UNIT/L (ref 10–44)
ANION GAP (OHS): 11 MMOL/L (ref 8–16)
AST SERPL-CCNC: 24 UNIT/L (ref 11–45)
BILIRUB SERPL-MCNC: 0.5 MG/DL (ref 0.1–1)
BUN SERPL-MCNC: 16 MG/DL (ref 8–23)
CALCIUM SERPL-MCNC: 9.4 MG/DL (ref 8.7–10.5)
CHLORIDE SERPL-SCNC: 107 MMOL/L (ref 95–110)
CO2 SERPL-SCNC: 28 MMOL/L (ref 23–29)
CREAT SERPL-MCNC: 1.3 MG/DL (ref 0.5–1.4)
ERYTHROCYTE [DISTWIDTH] IN BLOOD BY AUTOMATED COUNT: 17.2 % (ref 11.5–14.5)
GFR SERPLBLD CREATININE-BSD FMLA CKD-EPI: >60 ML/MIN/1.73/M2
GLUCOSE SERPL-MCNC: 106 MG/DL (ref 70–110)
HCT VFR BLD AUTO: 36.7 % (ref 40–54)
HGB BLD-MCNC: 11.1 GM/DL (ref 14–18)
IMM GRANULOCYTES # BLD AUTO: 0 K/UL (ref 0–0.04)
MCH RBC QN AUTO: 28.8 PG (ref 27–31)
MCHC RBC AUTO-ENTMCNC: 30.2 G/DL (ref 32–36)
MCV RBC AUTO: 95 FL (ref 82–98)
PLATELET # BLD AUTO: 141 K/UL (ref 150–450)
PMV BLD AUTO: 9 FL (ref 9.2–12.9)
POTASSIUM SERPL-SCNC: 4.3 MMOL/L (ref 3.5–5.1)
PROT SERPL-MCNC: 7 GM/DL (ref 6–8.4)
PSA SERPL-MCNC: 0.03 NG/ML
RBC # BLD AUTO: 3.85 M/UL (ref 4.6–6.2)
SODIUM SERPL-SCNC: 146 MMOL/L (ref 136–145)
TESTOST SERPL-MCNC: 15 NG/DL (ref 304–1227)
WBC # BLD AUTO: 1.78 K/UL (ref 3.9–12.7)

## 2025-08-01 PROCEDURE — 84153 ASSAY OF PSA TOTAL: CPT

## 2025-08-01 PROCEDURE — 96402 CHEMO HORMON ANTINEOPL SQ/IM: CPT

## 2025-08-01 PROCEDURE — 80053 COMPREHEN METABOLIC PANEL: CPT

## 2025-08-01 PROCEDURE — 63600175 PHARM REV CODE 636 W HCPCS: Mod: JZ,TB | Performed by: HOSPITALIST

## 2025-08-01 PROCEDURE — 36415 COLL VENOUS BLD VENIPUNCTURE: CPT

## 2025-08-01 PROCEDURE — 84403 ASSAY OF TOTAL TESTOSTERONE: CPT

## 2025-08-01 PROCEDURE — 99999 PR PBB SHADOW E&M-EST. PATIENT-LVL III: CPT | Mod: PBBFAC,,, | Performed by: HOSPITALIST

## 2025-08-01 PROCEDURE — 85027 COMPLETE CBC AUTOMATED: CPT

## 2025-08-01 RX ORDER — ATORVASTATIN CALCIUM 10 MG/1
10 TABLET, FILM COATED ORAL DAILY
Qty: 90 TABLET | Refills: 3 | Status: SHIPPED | OUTPATIENT
Start: 2025-08-01 | End: 2026-08-01

## 2025-08-01 RX ORDER — VIT C/E/ZN/COPPR/LUTEIN/ZEAXAN 250MG-90MG
1000 CAPSULE ORAL DAILY
Qty: 30 CAPSULE | Refills: 11 | Status: SHIPPED | OUTPATIENT
Start: 2025-08-01

## 2025-08-01 RX ADMIN — LEUPROLIDE ACETATE 45 MG: KIT at 08:08

## 2025-08-01 NOTE — PLAN OF CARE
Problem: Adult Inpatient Plan of Care  Goal: Plan of Care Review  Outcome: Progressing  Flowsheets (Taken 8/1/2025 0840)  Plan of Care Reviewed With: patient  Goal: Patient-Specific Goal (Individualized)  Outcome: Progressing  Flowsheets (Taken 8/1/2025 0840)  Individualized Care Needs: printout  Anxieties, Fears or Concerns: none  Patient/Family-Specific Goals (Include Timeframe): tolerate treatment  Goal: Optimal Comfort and Wellbeing  Outcome: Progressing  Intervention: Provide Person-Centered Care  Flowsheets (Taken 8/1/2025 0840)  Trust Relationship/Rapport:   care explained   reassurance provided   choices provided   thoughts/feelings acknowledged   emotional support provided   empathic listening provided   questions answered   questions encouraged

## 2025-08-01 NOTE — ASSESSMENT & PLAN NOTE
Hemoglobin A1C   Date Value Ref Range Status   07/11/2024 6.3 <=6.5 % Final     Comment:       This assay method is useful in the diagnosis of diabetes  mellitus, identification of patients at risk for developing  diabetes, and monitoring of patients with diabetes  mellitus.  Reference range information and glycemic goals  are based on recommendations from the ADA (American  Diabetes Association).    Hgb A1C Value                Glycemic Goal      <8%                          Less Stringent  <7%                          General (Non-Pregnant Adults)  <6.5%                        More Stringent  5.7% - 6.4%                  Increased risk for diabetes     Hemoglobin A1c   Date Value Ref Range Status   07/06/2025 5.8 (H) 4.0 - 5.6 % Final     Comment:     ADA Screening Guidelines:  5.7-6.4%  Consistent with prediabetes  >=6.5%  Consistent with diabetes    High levels of fetal hemoglobin interfere with the HbA1C  assay. Heterozygous hemoglobin variants (HbS, HgC, etc)do  not significantly interfere with this assay.   However, presence of multiple variants may affect accuracy.

## 2025-08-01 NOTE — TELEPHONE ENCOUNTER
Hospitalist Discharge Summary     Patient ID:  Meri Hanks  [id number]  61 y.o.  1937  10/17/2022    PCP on record: Sveta Alonso MD    Admit date: 10/17/2022  Discharge date and time: 10/19/2022    DISCHARGE DIAGNOSIS:    NSTEMI  New LBBB  TakoTsubo CM  Acute hypoxic respiratory failure 2/2 asthma exacerbation  Lactic acidosis    CONSULTATIONS:  IP CONSULT TO CARDIOLOGY    Excerpted HPI from H&P of Amy Vicente MD:  Bart Varma is a 78 y.o.  female with asthma, PMR, HTN, glaucoma, who presents with acute SOB for 1 day. Patient woke up at 3 am on 10/17/22 with difficulty breathing, wheezing, sweating. Denies cough, chest pain, nausea, vomiting, abdominal pain, hematuria, hematochezia, melena. Took a nebulized breathing treatment however symptoms persisted, thus she called 911, was brought to the ED. No sick contacts. Received an injection with Dr. Taylor Milian for PMR recently, unsure of the specific medication. No other medication changes. No sick contacts. In the ED, found to be hypoxic to 88%. Wheezing present, started on broad antibiotics, received IV steroids. Troponin elevated with new LBBB, Cardiology consulted.     ______________________________________________________________________  DISCHARGE SUMMARY/HOSPITAL COURSE:    Acute hypoxic respiratory failure 2/2 asthma exacerbation  Afebrile, normal white count. No productive cough. CXR clear. Follows with Dr. Rhea Barrera.  - holding off on antibiotics, unlikely pneumonia preset  - continue azithromycin 250 daily for antiinflammatory effect  - duonebs q4h scheduled with albuterol q2h prn  - s/p solumedrol in ED  - start prednisone 40 daily 10/18/22  - continue home singulair     NSTEMI  New LBBB  TakoTsubo CM  Troponin 400 -> 3600 -> 4000. No chest pain.   - s/p  in ED, continue ASA 81 daily  S/p C 10/18 no significant CAD  Hypotension post cath, systolic baseline 531'R  Cardiology recs to give fluids  Follow up ECHO I spoke to Cindy in the BR lab. She had gotten in touch with Dr. Espinoza.    required outpatient with Dr. Juwan Camarillo  Aspirin, statin, b-blocker     Lactic acidosis  Likely 2/2 NSTEMI vs asthma exacerbation. Is receiving scheduled albuterol, may increase lactate. ?HTN  - continue home metop tartrate 25 daily  - holding indapamide 1.25     ? PMR  Recent injection, patient family unsure of what medication was given     Glaucoma  - continue home drops (dorzolamide, brimonidine, bimatoprost)      for full details see H&P, daily progress notes, labs, consult notes. _______________________________________________________________________  Patient seen and examined by me on discharge day. Pertinent Findings:  General:          Alert, cooperative, no acute distress    Resp:               Bibasilar rales, No accessory muscle use  CV:                  Regular  rhythm,  No edema  GI:                   Soft, Non distended, Non tender. +Bowel sounds  Neurologic:       Alert and oriented X 3, normal speech,   Psych:   Not anxious nor agitated  Skin:                No rashes. No jaundice  _______________________________________________________________________  DISCHARGE MEDICATIONS:   Current Discharge Medication List        START taking these medications    Details   atorvastatin (LIPITOR) 40 mg tablet Take 1 Tablet by mouth nightly for 30 days. Qty: 30 Tablet, Refills: 0  Start date: 10/19/2022, End date: 11/18/2022      albuterol-ipratropium (DUO-NEB) 2.5 mg-0.5 mg/3 ml nebu 3 mL by Nebulization route every four (4) hours as needed for Wheezing for up to 30 days. Qty: 180 Each, Refills: 0  Start date: 10/19/2022, End date: 11/18/2022      aspirin 81 mg chewable tablet Take 1 Tablet by mouth daily for 30 days. Qty: 30 Tablet, Refills: 0  Start date: 10/19/2022, End date: 11/18/2022      predniSONE (DELTASONE) 20 mg tablet Take 2 Tablets by mouth daily (with breakfast) for 3 days.   Qty: 3 Tablet, Refills: 0  Start date: 10/19/2022, End date: 10/22/2022           CONTINUE these medications which have CHANGED    Details   azithromycin (ZITHROMAX) 250 mg tablet Take as directed  Qty: 3 Tablet, Refills: 0  Start date: 10/20/2022, End date: 10/22/2022           CONTINUE these medications which have NOT CHANGED    Details   benzonatate (TESSALON) 100 mg capsule TAKE 1 CAPSULE BY MOUTH THREE TIMES A DAY AS NEEDED FOR COUGH      ondansetron hcl (Zofran) 4 mg tablet Take 1 Tablet by mouth every eight (8) hours as needed for Nausea. Qty: 20 Tablet, Refills: 0      senna-docusate (PERICOLACE) 8.6-50 mg per tablet Take 1 Tab by mouth daily. Qty: 30 Tab, Refills: 0      brimonidine 0.025 % drop Apply 1 Drop to eye two (2) times a day. Right eye      dorzolamide (TRUSOPT) 2 % ophthalmic solution Administer 1 Drop to right eye two (2) times a day. metoprolol tartrate (LOPRESSOR) 25 mg tablet Take 25 mg by mouth daily. indapamide (LOZOL) 1.25 mg tablet Take 1.25 mg by mouth daily. montelukast (SINGULAIR) 10 mg tablet Take 10 mg by mouth daily. pantoprazole (PROTONIX) 40 mg tablet Take 40 mg by mouth daily. bimatoprost (LUMIGAN) 0.01 % ophthalmic drops Administer 1 Drop to right eye every evening.      multivitamin (ONE A DAY) tablet Take 1 Tab by mouth daily. albuterol (PROVENTIL HFA, VENTOLIN HFA, PROAIR HFA) 90 mcg/actuation inhaler Take 1 Puff by inhalation as needed for Wheezing.      lorazepam (ATIVAN) 0.5 mg tablet Take 0.5 mg by mouth nightly. STOP taking these medications       ondansetron (ZOFRAN ODT) 4 mg disintegrating tablet Comments:   Reason for Stopping:                 Patient Follow Up Instructions: Activity: Activity as tolerated  Diet: Cardiac Diet  Wound Care: None needed    Follow-up with Cardiology in 1 week.   Follow-up tests/labs ECHO in 2 weeks    Follow-up Information       Follow up With Specialties Details Why Contact Info    Herminia Garcia NP Nurse Practitioner Schedule an appointment as soon as possible for a visit in 2 week(s)  [address]  [address] MD JANNIE Nielson [address]  [phone]            ________________________________________________________________    Risk of deterioration: Low    Condition at Discharge:  Stable  __________________________________________________________________    Disposition  Home with family, no needs    ____________________________________________________________________    Code Status: Full Code  ___________________________________________________________________      Total time in minutes spent coordinating this discharge (includes going over instructions, follow-up, prescriptions, and preparing report for sign off to her PCP) :  >30 minutes    Signed:  Kev Squires MD

## 2025-08-01 NOTE — TELEPHONE ENCOUNTER
Copied from CRM #[id number]. Topic: General Inquiry - Return Call  >> Aug 1, 2025  8:11 AM Cailin wrote:  Bharat  with [facility] is calling to speak with someone in the office to advise of pt having a critical. I did call different exts in the office and was unable to speak with anyone. Caller is asking for a return call back soon. Thanks.       [phone]

## 2025-08-01 NOTE — PROGRESS NOTES
[facility]: Established patient visit  Best Contact Phone Number(s): There are no phone numbers on file.      Cancer/Stage/TNM:    Cancer Staging   Prostate cancer  Staging form: Prostate, AJCC 8th Edition  - Clinical: Stage KAILEY (cT3b, cN1, cM0, PSA: 2.9, Grade Group: 5) - Signed by Shola Rios MD on 2/18/2025        Reason for visit:  Regional node positive prostate cancer    Molecular:  Germline Testing: Negative    Densitometry:   07/29/25L Lower risk osteopenia    Treatment History:   02/13/25 -   ADT  03/27/25 -   Darolutamide  05/12/25 - 06/18/25 EBRT    HPI:   Hector Smith is a 64 y.o. 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 Initially diagnosed via TURP 01/2025 in the setting of recurrent acute urinary retention. PSMA PET imaging 01/2025 showed PSMA avide pelvic LAD along with involvement of the right SV. He started ADT 02/13/25 with addition of darolutamide 03/27/25. He completed concurrent EBRT 05/2025 - 06/2025. He presents to medical oncology clinic for routine follow up.      Interval Events:    Admitted 7/5/25 - 7/9/25 with ESBL e coli UTI. Completed course of ertapenem.  Currently feels OK. Batres catheter is out after recent UTI and urinary retention. No urinary complaints at present. No signficant hesitancy although flow can be weaker in the morning. Reports nocturia 2x per night. No hematuri and no dysuria. No fevers or chills. Has some ongoing hot flashes about twice per day.  Tolerating darolutamide well. PSA pending today.    Notably stopped metoprolol and atorvastatin during last hospitalization.       History has been obtained by chart review and discussion with the patient.     Oncology History Overview Note     11/2024: New onset urinary retention requiring batres catheterization    1/6/25 TURP  PROSTATE CHIPS, TRANSURETHRAL RESECTION:   - Prostatic adenocarcinoma, Cayetano score 4+5=9 (Grade group 5) involving 11-20% of the entirely submitted  "specimen.   - Perineural invasion is identified.   - Lymphovascular invasion is identified.   - Additional findings: Benign prostatic hyperplasia.     1/29/25: PSMA PET CT  Impression:  1. Ill-defined masslike nodularity of the left inferior prostate favored to correspond to the known prostatic adenocarcinoma.  2. Multiple pelvic and retroperitoneal PLY avid lymph nodes.  There is suspected involvement of the right seminal vesicle.  3. Free air within the bladder-recommend correlation for recent instrumentation; otherwise, possible fistula formation to the rectum should be considered.    2/7/25: PSA 6.4    02/13/25: Lupron 45mg     Prostate cancer   2/7/2025 Initial Diagnosis    Prostate cancer     2/18/2025 Cancer Staged    Staging form: Prostate, AJCC 8th Edition  - Clinical: Stage KAILEY (cT3b, cN1, cM0, PSA: 2.9, Grade Group: 5)      Radiation Therapy    Lupron was 1st delivered on 02/13/2025.      Dr. Rios treated the pelvis in 28 fractions last on 06/18/2025, including 70 Gy to the prostate, 64.4 Gy to the involved pelvic lymph nodes, and 50.4 Gy to the elective pelvic nodes           Past Medical History:   Diagnosis Date    Diabetes mellitus, type 2     Hyperlipidemia     Hypertension          Past Surgical History:   Procedure Laterality Date    SPINAL FUSION N/A 2019    TRANSURETHRAL RESECTION OF PROSTATE N/A 1/6/2025    Procedure: TURP (TRANSURETHRAL RESECTION OF PROSTATE);  Surgeon: Ron Lassiter MD;  Location: [facility];  Service: Urology;  Laterality: N/A;         Review of patient's allergies indicates:   Allergen Reactions    Adhesive Rash     Silk Tape    Suture, silk Rash         Current Medications[1]     Objective:      Physical Exam:   /69   Pulse 69   Temp 97.8 °F (36.6 °C) (Temporal)   Ht 5' 2" (1.575 m)   Wt 81.1 kg (178 lb 10.9 oz)   SpO2 97%   BMI 32.68 kg/m²       ECOG Performance status: (0) Fully active, able to carry on all predisease performance without restriction   "   Physical Exam  Constitutional:       General: He is not in acute distress.     Appearance: Normal appearance.   HENT:      Head: Normocephalic.   Eyes:      General: No scleral icterus.     Extraocular Movements: Extraocular movements intact.      Conjunctiva/sclera: Conjunctivae normal.   Cardiovascular:      Rate and Rhythm: Normal rate.      Heart sounds: No murmur heard.  Pulmonary:      Effort: Pulmonary effort is normal. No respiratory distress.   Abdominal:      General: There is no distension.      Palpations: Abdomen is soft.   Skin:     General: Skin is warm and dry.   Neurological:      Mental Status: He is alert and oriented to person, place, and time.      Motor: No weakness.   Psychiatric:         Mood and Affect: Mood normal.         Behavior: Behavior normal.         Thought Content: Thought content normal.          Recent Labs:   Lab Results   Component Value Date    WBC 2.58 (L) 07/15/2025    RBC 3.77 (L) 07/15/2025    HGB 10.9 (L) 07/15/2025    HCT 35.0 (L) 07/15/2025     07/15/2025     (H) 08/01/2025    K 4.3 08/01/2025     08/01/2025    CO2 28 08/01/2025     08/01/2025    BUN 16 08/01/2025    CREATININE 1.3 08/01/2025    CALCIUM 9.4 08/01/2025    PHOS 2.8 07/08/2025    BILITOT 0.5 08/01/2025    AST 24 08/01/2025    ALT 26 08/01/2025          Lab Results   Component Value Date    PSADIAG 0.91 03/21/2025    PSADIAG 6.4 (H) 02/07/2025    PSATOTAL 2.9 07/11/2024    PSATOTAL 2.6 07/11/2023    PSATOTAL 2.4 06/23/2022    PSATOTAL 2.1 09/02/2021    PSA 0.06 07/05/2025    PSA 0.27 05/23/2025        Cardiovascular Screening:  Primary care physician: Ochoa Lane MD      The ASCVD Risk score (Willie DK, et al., 2019) failed to calculate for the following reasons:    Cannot find a previous HDL lab    Cannot find a previous total cholesterol lab    EKG:   Results for orders placed or performed during the hospital encounter of 07/05/25   EKG 12-lead    Collection Time:  07/05/25  1:56 PM   Result Value Ref Range    QRS Duration 100 ms    [facility] QTC Calculation 426 ms    Narrative    Test Reason : I95.9,    Vent. Rate :  79 BPM     Atrial Rate :  79 BPM     P-R Int : 198 ms          QRS Dur : 100 ms      QT Int : 372 ms       P-R-T Axes :  49 -27  88 degrees    QTcB Int : 426 ms    Normal sinus rhythm  Moderate voltage criteria for LVH, may be normal variant ( R in aVL ,  [place] product )  Inferior infarct (cited on or before 02-Jan-2025)  Possible Anterior infarct ,age undetermined  Abnormal ECG  When compared with ECG of 02-Jan-2025 10:44,  Sinus rhythm has replaced Electronic atrial pacemaker  QT has shortened  Confirmed by Lubna Gu ([id number]) on 7/6/2025 9:53:48 AM    Referred By: KIPERRAL SELF           Confirmed By: Lubna Gu       Body mass index is 32.68 kg/m².    Lab Results   Component Value Date    HGBA1C 5.8 (H) 07/06/2025          Bone Health    Lab Results   Component Value Date    NGZGZSST90KE 17 (L) 03/21/2025    VKPNACXG94DV 17 (L) 03/21/2025        No results found for this or any previous visit.         PSMA PET IMAGING+     Results for orders placed during the hospital encounter of 01/29/25    NM PET CT F 18 PYL PSMA, Midthigh to Vertex    Impression  1. Ill-defined masslike nodularity of the left inferior prostate favored to correspond to the known prostatic adenocarcinoma.  2. Multiple pelvic and retroperitoneal PLY avid lymph nodes.  There is suspected involvement of the right seminal vesicle.  3. Free air within the bladder-recommend correlation for recent instrumentation; otherwise, possible fistula formation to the rectum should be considered.      Electronically signed by: Mj Sam  Date:    01/29/2025  Time:    13:44       I have personally reviewed the above imaging.     Path:   Reviewed pathology as documented above.      Diagnoses:     1. Prostate cancer    2. Essential hypertension    3. Congestive heart failure, unspecified HF  chronicity, unspecified heart failure type    4. Type 2 diabetes mellitus without complication, without long-term current use of insulin    5. Long term current use of therapeutic drug              Assessment and Plan:     1. Prostate cancer  Overview:  High-risk regional node positive prostate cancer. Initially diagnosed via TURP 01/2025 in the setting of recurrent acute urinary retention. PSMA PET imaging 01/2025 showed PSMA avide pelvic LAD along with involvement of the right SV. He started ADT 02/13/25 with addition of darolutamide 03/27/25.    Assessment & Plan:  -- Cont ADT + darolutamide  -- Lupron today; next 01/16/25  -- Completed RT  -- Restart atorvastatin 10mg; DR for darolutamide interaction  -- Discuss metoprolol with cardiology  -- Con't Ca/D; repeat DEXA 2 years  -- FU 3 months PSA check    Orders:  -     atorvastatin (LIPITOR) 10 MG tablet; Take 1 tablet (10 mg total) by mouth once daily.  Dispense: 90 tablet; Refill: 3  -     cholecalciferol, vitamin D3, (VITAMIN D3) 25 mcg (1,000 unit) capsule; Take 1 capsule (1,000 Units total) by mouth once daily.  Dispense: 30 capsule; Refill: 11    2. Essential hypertension  Overview:  Home mediations include amlodipine and metoporolol      3. Congestive heart failure, unspecified HF chronicity, unspecified heart failure type  Overview:  Home medications include Entresto, metoprolol, and furosemdie.    Assessment & Plan:  - Metoprolol has been on hold since admission 07/2025  - Asked him to inquire with cardiology about restarting        4. Type 2 diabetes mellitus without complication, without long-term current use of insulin  Overview:  Home medications include Janumet and Farxiga    Assessment & Plan:  Hemoglobin A1C   Date Value Ref Range Status   07/11/2024 6.3 <=6.5 % Final     Comment:       This assay method is useful in the diagnosis of diabetes  mellitus, identification of patients at risk for developing  diabetes, and monitoring of patients with  diabetes  mellitus.  Reference range information and glycemic goals  are based on recommendations from the ADA (American  Diabetes Association).    Hgb A1C Value                Glycemic Goal      <8%                          Less Stringent  <7%                          General (Non-Pregnant Adults)  <6.5%                        More Stringent  5.7% - 6.4%                  Increased risk for diabetes     Hemoglobin A1c   Date Value Ref Range Status   07/06/2025 5.8 (H) 4.0 - 5.6 % Final     Comment:     ADA Screening Guidelines:  5.7-6.4%  Consistent with prediabetes  >=6.5%  Consistent with diabetes    High levels of fetal hemoglobin interfere with the HbA1C  assay. Heterozygous hemoglobin variants (HbS, HgC, etc)do  not significantly interfere with this assay.   However, presence of multiple variants may affect accuracy.           5. Long term current use of therapeutic drug  -     cholecalciferol, vitamin D3, (VITAMIN D3) 25 mcg (1,000 unit) capsule; Take 1 capsule (1,000 Units total) by mouth once daily.  Dispense: 30 capsule; Refill: 11    Other orders  -     leuprolide acetate (6 month) injection 45 mg              Follow up:   Route Chart for Scheduling    Med Onc Chart Routing      Follow up with physician 3 months.   Follow up with EFREN    Infusion scheduling note    Injection scheduling note    Labs CBC, CMP and PSA   Scheduling:  Preferred lab:  Lab interval:     Imaging    Pharmacy appointment    Other referrals                   Supportive Plan Information  OP PROSTATE LEUPROLIDE (LUPRON) 6 MONTH Van Espinoza MD   Associated Diagnosis: Prostate cancer Stage KAILEY cT3b, cN1, cM0, PSA: 2.9, Grade Group: 5 noted on 2/7/2025   Line of treatment: First Line   Treatment goal: Curative     Upcoming Treatment Dates - OP PROSTATE LEUPROLIDE (LUPRON) 6 MONTH    8/1/2025       Chemotherapy       leuprolide acetate (6 month) injection 45 mg  1/16/2026       Chemotherapy       leuprolide acetate (6 month) injection  45 mg    Therapy Plan Information  INF FLUIDS for Prostate cancer, noted on 2/7/2025  IV Fluids  sodium chloride 0.9% bolus 1,000 mL 1,000 mL  1,000 mL, Intravenous, Every visit  Flushes  sodium chloride 0.9% flush 10 mL  10 mL, Intravenous, PRN  heparin, porcine (PF) 100 unit/mL injection flush 500 Units  500 Units, Intravenous, PRN      No therapy plan of the specified type found.    No therapy plan of the specified type found.         The above information has been reviewed with the patient and all questions have been answered to their apparent satisfaction.  They understand that they can call the clinic with any questions.    Van Espinoza MD MPH  [place] [address]  Email: [email or web address]  Phone: o) [phone] (c) [phone]                    [1]   Current Outpatient Medications   Medication Sig Dispense Refill    amLODIPine (NORVASC) 5 MG tablet Take 5 mg by mouth once daily.      busPIRone (BUSPAR) 5 MG Tab Take 5 mg by mouth 2 (two) times daily.      clopidogreL (PLAVIX) 75 mg tablet Take 75 mg by mouth once daily.      colchicine (COLCRYS) 0.6 mg tablet Take 0.6 mg by mouth daily as needed.      dapagliflozin propanediol (FARXIGA) 10 mg tablet Take 10 mg by mouth.      darolutamide 300 mg Tab Take 2 tablets (600 mg) by mouth 2 (two) times a day. 120 tablet 5    ENTRESTO 24-26 mg per tablet Take 1 tablet by mouth 2 (two) times daily.      furosemide (LASIX) 40 MG tablet Take 40 mg by mouth once daily.      gabapentin (NEURONTIN) 300 MG capsule       JANUMET  mg per tablet Take 1 tablet by mouth 2 (two) times daily.      oxyCODONE-acetaminophen (PERCOCET) 5-325 mg per tablet Take 1 tablet by mouth every 4 (four) hours as needed for Pain. 15 tablet 0    phenazopyridine (PYRIDIUM) 200 MG tablet Take 1 tablet (200 mg total) by mouth 3 (three) times daily as needed for Pain. 15 tablet 0    sildenafiL (VIAGRA) 100  MG tablet Take 1 tablet (100 mg total) by mouth daily as needed for Erectile Dysfunction. 15 tablet 11    solifenacin (VESICARE) 5 MG tablet Take 1 tablet (5 mg total) by mouth once daily. 30 tablet 11    tamsulosin (FLOMAX) 0.4 mg Cap Take 1 capsule (0.4 mg total) by mouth 2 (two) times a day. 60 capsule 11    zolpidem (AMBIEN) 5 MG Tab Take 5 mg by mouth.      atorvastatin (LIPITOR) 10 MG tablet Take 1 tablet (10 mg total) by mouth once daily. 90 tablet 3    cholecalciferol, vitamin D3, (VITAMIN D3) 25 mcg (1,000 unit) capsule Take 1 capsule (1,000 Units total) by mouth once daily. 30 capsule 11     No current facility-administered medications for this visit.

## 2025-08-01 NOTE — ASSESSMENT & PLAN NOTE
- Metoprolol has been on hold since admission 07/2025  - Asked him to inquire with cardiology about restarting

## 2025-08-01 NOTE — ASSESSMENT & PLAN NOTE
-- Cont ADT + darolutamide  -- Lupron today; next 01/16/25  -- Completed RT  -- Restart atorvastatin 10mg;  for darolutamide interaction  -- Discuss metoprolol with cardiology  -- Con't Ca/D; repeat DEXA 2 years  -- FU 3 months PSA check

## 2025-08-01 NOTE — DISCHARGE INSTRUCTIONS
[facility]  [address] [address]  [address]  [phone] phone     [phone] fax  Hours of Operation: Monday- Friday 8:00am- 5:00pm  After hours phone  [phone]  Hematology / Oncology Physicians on call      LB Zeng [address] NP Phaon Dunbar, NP Khelsea Conley, FNP    Please call with any concerns regarding your appointment today.

## 2025-08-05 ENCOUNTER — TELEPHONE (OUTPATIENT)
Dept: HEMATOLOGY/ONCOLOGY | Facility: CLINIC | Age: 65
End: 2025-08-05
Payer: MEDICARE

## 2025-08-05 NOTE — TELEPHONE ENCOUNTER
Called pt to address recent distress screening. He is out of anxiety medication (buspirone); will need to follow up with PCP to refill. He does not think he needs any counseling at this time but is appreciative of the call. Encouraged him to follow up should any needs arise. SW team will remain available.        7/25/2025     1:34 PM 7/22/2025     9:35 AM 5/23/2025     8:04 AM 5/16/2025     1:18 PM 3/21/2025     9:44 AM 3/16/2025     5:52 PM 2/18/2025     8:43 AM   DISTRESS SCREENING   Distress Score 6 0 - No Distress  7 6 5 5   Practical Concerns Taking care of myself;Work;Finances None of these None of these Taking care of myself;Work;Finances None of these Taking care of myself;Work;Housing/Utilities;Finances    Social Concerns Relationship with spouse or partner None of these None of these None of these None of these Relationship with spouse or partner    Emotional Concerns Worry or anxiety;Anger None of these None of these Worry or anxiety Worry or anxiety Worry or anxiety Worry or anxiety   Spiritual or Oriental orthodox Concerns None of these None of these None of these Sense of meaning or purpose None of these None of these    Physical Concerns Pain None of these None of these Fatigue;Sexual health Sleep;Fatigue Pain;Sleep;Sexual health    Other Problems     pain when urinating

## 2025-08-12 DIAGNOSIS — C61 PROSTATE CANCER: ICD-10-CM

## 2025-08-12 DIAGNOSIS — C61 PROSTATE CANCER: Primary | ICD-10-CM

## 2025-08-12 RX ORDER — TAMSULOSIN HYDROCHLORIDE 0.4 MG/1
0.4 CAPSULE ORAL 2 TIMES DAILY
Qty: 60 CAPSULE | Refills: 11 | Status: SHIPPED | OUTPATIENT
Start: 2025-08-12 | End: 2026-08-12

## 2025-08-12 RX ORDER — TAMSULOSIN HYDROCHLORIDE 0.4 MG/1
0.4 CAPSULE ORAL 2 TIMES DAILY
Qty: 60 CAPSULE | Refills: 11 | OUTPATIENT
Start: 2025-08-12 | End: 2026-08-12

## 2025-08-18 ENCOUNTER — PATIENT MESSAGE (OUTPATIENT)
Dept: ADMINISTRATIVE | Facility: OTHER | Age: 65
End: 2025-08-18
Payer: MEDICARE

## 2025-08-30 ENCOUNTER — EXTERNAL HOME HEALTH (OUTPATIENT)
Dept: HOME HEALTH SERVICES | Facility: HOSPITAL | Age: 65
End: 2025-08-30
Payer: MEDICARE

## 2025-09-03 ENCOUNTER — OFFICE VISIT (OUTPATIENT)
Dept: UROLOGY | Facility: CLINIC | Age: 65
End: 2025-09-03
Payer: MEDICARE

## 2025-09-03 VITALS
BODY MASS INDEX: 32.91 KG/M2 | WEIGHT: 178.81 LBS | HEIGHT: 62 IN | HEART RATE: 70 BPM | SYSTOLIC BLOOD PRESSURE: 122 MMHG | DIASTOLIC BLOOD PRESSURE: 71 MMHG

## 2025-09-03 DIAGNOSIS — C61 PROSTATE CANCER: Primary | ICD-10-CM

## 2025-09-03 DIAGNOSIS — N40.1 BENIGN PROSTATIC HYPERPLASIA WITH URINARY OBSTRUCTION: ICD-10-CM

## 2025-09-03 DIAGNOSIS — N13.8 BENIGN PROSTATIC HYPERPLASIA WITH URINARY OBSTRUCTION: ICD-10-CM

## 2025-09-03 PROCEDURE — 1126F AMNT PAIN NOTED NONE PRSNT: CPT | Mod: CPTII,S$GLB,, | Performed by: UROLOGY

## 2025-09-03 PROCEDURE — 99214 OFFICE O/P EST MOD 30 MIN: CPT | Mod: S$GLB,,, | Performed by: UROLOGY

## 2025-09-03 PROCEDURE — 99999 PR PBB SHADOW E&M-EST. PATIENT-LVL IV: CPT | Mod: PBBFAC,,, | Performed by: UROLOGY

## 2025-09-03 PROCEDURE — 3044F HG A1C LEVEL LT 7.0%: CPT | Mod: CPTII,S$GLB,, | Performed by: UROLOGY

## 2025-09-03 PROCEDURE — 1160F RVW MEDS BY RX/DR IN RCRD: CPT | Mod: CPTII,S$GLB,, | Performed by: UROLOGY

## 2025-09-03 PROCEDURE — 1159F MED LIST DOCD IN RCRD: CPT | Mod: CPTII,S$GLB,, | Performed by: UROLOGY

## 2025-09-03 PROCEDURE — 3288F FALL RISK ASSESSMENT DOCD: CPT | Mod: CPTII,S$GLB,, | Performed by: UROLOGY

## 2025-09-03 PROCEDURE — 1101F PT FALLS ASSESS-DOCD LE1/YR: CPT | Mod: CPTII,S$GLB,, | Performed by: UROLOGY

## 2025-09-03 PROCEDURE — 3078F DIAST BP <80 MM HG: CPT | Mod: CPTII,S$GLB,, | Performed by: UROLOGY

## 2025-09-03 PROCEDURE — 3074F SYST BP LT 130 MM HG: CPT | Mod: CPTII,S$GLB,, | Performed by: UROLOGY

## 2025-09-03 PROCEDURE — 3008F BODY MASS INDEX DOCD: CPT | Mod: CPTII,S$GLB,, | Performed by: UROLOGY

## 2025-09-03 PROCEDURE — 4010F ACE/ARB THERAPY RXD/TAKEN: CPT | Mod: CPTII,S$GLB,, | Performed by: UROLOGY

## (undated) DEVICE — SPONGE COTTON TRAY 4X4IN

## (undated) DEVICE — Device

## (undated) DEVICE — SYR 50ML CATH TIP

## (undated) DEVICE — TOWEL OR DISP STRL BLUE 4/PK

## (undated) DEVICE — SYR 10CC LUER LOCK

## (undated) DEVICE — TRAY SKIN SCRUB WET 4 COMPART

## (undated) DEVICE — GLOVE SIGNATURE ESSNTL LTX 7.5

## (undated) DEVICE — BOWL STERILE LARGE 32OZ

## (undated) DEVICE — GOWN POLY REINF X-LONG XL

## (undated) DEVICE — ELECTRODE LOOP CUTTING BIPOLAR

## (undated) DEVICE — DRAPE T CYSTOSCOPY STERILE

## (undated) DEVICE — BAG DRAIN ANTI REFLUX 4000ML

## (undated) DEVICE — CANISTER SUCTION JUMBO 12L

## (undated) DEVICE — SOL IRRI STRL WATER 1000ML

## (undated) DEVICE — UROVIEW 2600/2800

## (undated) DEVICE — CONTAINER SPECIMEN OR STER 4OZ